# Patient Record
Sex: FEMALE | Race: WHITE | URBAN - METROPOLITAN AREA
[De-identification: names, ages, dates, MRNs, and addresses within clinical notes are randomized per-mention and may not be internally consistent; named-entity substitution may affect disease eponyms.]

---

## 2017-06-28 ENCOUNTER — HOSPITAL ENCOUNTER (EMERGENCY)
Facility: CLINIC | Age: 37
End: 2017-06-28
Payer: COMMERCIAL

## 2017-08-05 ENCOUNTER — HEALTH MAINTENANCE LETTER (OUTPATIENT)
Age: 37
End: 2017-08-05

## 2018-08-12 ENCOUNTER — HEALTH MAINTENANCE LETTER (OUTPATIENT)
Age: 38
End: 2018-08-12

## 2019-11-05 ENCOUNTER — HEALTH MAINTENANCE LETTER (OUTPATIENT)
Age: 39
End: 2019-11-05

## 2020-11-22 ENCOUNTER — HEALTH MAINTENANCE LETTER (OUTPATIENT)
Age: 40
End: 2020-11-22

## 2021-01-01 ENCOUNTER — HEALTH MAINTENANCE LETTER (OUTPATIENT)
Age: 41
End: 2021-01-01

## 2022-01-01 ENCOUNTER — APPOINTMENT (OUTPATIENT)
Dept: GENERAL RADIOLOGY | Facility: CLINIC | Age: 42
DRG: 163 | End: 2022-01-01
Attending: NURSE PRACTITIONER
Payer: COMMERCIAL

## 2022-01-01 ENCOUNTER — APPOINTMENT (OUTPATIENT)
Dept: CT IMAGING | Facility: CLINIC | Age: 42
End: 2022-01-01
Attending: EMERGENCY MEDICINE
Payer: COMMERCIAL

## 2022-01-01 ENCOUNTER — HEALTH MAINTENANCE LETTER (OUTPATIENT)
Age: 42
End: 2022-01-01

## 2022-01-01 ENCOUNTER — APPOINTMENT (OUTPATIENT)
Dept: NEUROLOGY | Facility: CLINIC | Age: 42
DRG: 163 | End: 2022-01-01
Attending: NURSE PRACTITIONER
Payer: COMMERCIAL

## 2022-01-01 ENCOUNTER — APPOINTMENT (OUTPATIENT)
Dept: PHYSICAL THERAPY | Facility: CLINIC | Age: 42
DRG: 163 | End: 2022-01-01
Attending: PHYSICIAN ASSISTANT
Payer: COMMERCIAL

## 2022-01-01 ENCOUNTER — HOSPITAL ENCOUNTER (EMERGENCY)
Facility: CLINIC | Age: 42
Discharge: ANOTHER HOSPITAL | End: 2022-01-15
Attending: EMERGENCY MEDICINE | Admitting: EMERGENCY MEDICINE
Payer: COMMERCIAL

## 2022-01-01 ENCOUNTER — APPOINTMENT (OUTPATIENT)
Dept: ULTRASOUND IMAGING | Facility: CLINIC | Age: 42
DRG: 163 | End: 2022-01-01
Attending: NURSE PRACTITIONER
Payer: COMMERCIAL

## 2022-01-01 ENCOUNTER — APPOINTMENT (OUTPATIENT)
Dept: CARDIOLOGY | Facility: CLINIC | Age: 42
DRG: 163 | End: 2022-01-01
Attending: NURSE PRACTITIONER
Payer: COMMERCIAL

## 2022-01-01 ENCOUNTER — APPOINTMENT (OUTPATIENT)
Dept: GENERAL RADIOLOGY | Facility: CLINIC | Age: 42
DRG: 163 | End: 2022-01-01
Attending: PHYSICIAN ASSISTANT
Payer: COMMERCIAL

## 2022-01-01 ENCOUNTER — HOSPITAL ENCOUNTER (EMERGENCY)
Facility: CLINIC | Age: 42
Discharge: STILL ADMITTED OR ADMINISTRATIVE | DRG: 163 | End: 2022-01-01
Attending: EMERGENCY MEDICINE | Admitting: EMERGENCY MEDICINE
Payer: COMMERCIAL

## 2022-01-01 ENCOUNTER — HOSPITAL ENCOUNTER (INPATIENT)
Facility: CLINIC | Age: 42
LOS: 11 days | DRG: 163 | End: 2022-01-26
Attending: EMERGENCY MEDICINE | Admitting: INTERNAL MEDICINE
Payer: COMMERCIAL

## 2022-01-01 ENCOUNTER — APPOINTMENT (OUTPATIENT)
Dept: GENERAL RADIOLOGY | Facility: CLINIC | Age: 42
End: 2022-01-01
Attending: EMERGENCY MEDICINE
Payer: COMMERCIAL

## 2022-01-01 ENCOUNTER — APPOINTMENT (OUTPATIENT)
Dept: GENERAL RADIOLOGY | Facility: CLINIC | Age: 42
DRG: 163 | End: 2022-01-01
Attending: INTERNAL MEDICINE
Payer: COMMERCIAL

## 2022-01-01 ENCOUNTER — APPOINTMENT (OUTPATIENT)
Dept: INTERVENTIONAL RADIOLOGY/VASCULAR | Facility: CLINIC | Age: 42
DRG: 163 | End: 2022-01-01
Attending: EMERGENCY MEDICINE
Payer: COMMERCIAL

## 2022-01-01 ENCOUNTER — APPOINTMENT (OUTPATIENT)
Dept: MRI IMAGING | Facility: CLINIC | Age: 42
DRG: 163 | End: 2022-01-01
Attending: PHYSICIAN ASSISTANT
Payer: COMMERCIAL

## 2022-01-01 VITALS
DIASTOLIC BLOOD PRESSURE: 86 MMHG | WEIGHT: 132.28 LBS | TEMPERATURE: 92.7 F | OXYGEN SATURATION: 99 % | RESPIRATION RATE: 24 BRPM | HEART RATE: 112 BPM | SYSTOLIC BLOOD PRESSURE: 106 MMHG

## 2022-01-01 VITALS
SYSTOLIC BLOOD PRESSURE: 144 MMHG | TEMPERATURE: 101.7 F | RESPIRATION RATE: 18 BRPM | HEIGHT: 66 IN | WEIGHT: 112.43 LBS | DIASTOLIC BLOOD PRESSURE: 81 MMHG | BODY MASS INDEX: 18.07 KG/M2 | HEART RATE: 95 BPM | OXYGEN SATURATION: 96 %

## 2022-01-01 DIAGNOSIS — I46.9 CARDIAC ARREST (H): ICD-10-CM

## 2022-01-01 DIAGNOSIS — I26.09 OTHER ACUTE PULMONARY EMBOLISM WITH ACUTE COR PULMONALE (H): ICD-10-CM

## 2022-01-01 LAB
ABO/RH(D): NORMAL
ACT BLD: 203 SECONDS (ref 74–150)
ALBUMIN SERPL-MCNC: 2.2 G/DL (ref 3.4–5)
ALBUMIN SERPL-MCNC: 2.2 G/DL (ref 3.4–5)
ALBUMIN SERPL-MCNC: 2.3 G/DL (ref 3.4–5)
ALBUMIN SERPL-MCNC: 2.3 G/DL (ref 3.4–5)
ALBUMIN SERPL-MCNC: 2.4 G/DL (ref 3.4–5)
ALBUMIN SERPL-MCNC: 2.4 G/DL (ref 3.4–5)
ALBUMIN SERPL-MCNC: 2.6 G/DL (ref 3.4–5)
ALBUMIN SERPL-MCNC: 2.7 G/DL (ref 3.4–5)
ALBUMIN SERPL-MCNC: 3.2 G/DL (ref 3.4–5)
ALBUMIN SERPL-MCNC: 3.2 G/DL (ref 3.4–5)
ALBUMIN UR-MCNC: 50 MG/DL
ALBUMIN UR-MCNC: NEGATIVE MG/DL
ALP SERPL-CCNC: 30 U/L (ref 40–150)
ALP SERPL-CCNC: 31 U/L (ref 40–150)
ALP SERPL-CCNC: 35 U/L (ref 40–150)
ALP SERPL-CCNC: 37 U/L (ref 40–150)
ALP SERPL-CCNC: 39 U/L (ref 40–150)
ALP SERPL-CCNC: 41 U/L (ref 40–150)
ALP SERPL-CCNC: 45 U/L (ref 40–150)
ALP SERPL-CCNC: 45 U/L (ref 40–150)
ALP SERPL-CCNC: 46 U/L (ref 40–150)
ALP SERPL-CCNC: 46 U/L (ref 40–150)
ALP SERPL-CCNC: 48 U/L (ref 40–150)
ALP SERPL-CCNC: 50 U/L (ref 40–150)
ALT SERPL W P-5'-P-CCNC: 107 U/L (ref 0–50)
ALT SERPL W P-5'-P-CCNC: 130 U/L (ref 0–50)
ALT SERPL W P-5'-P-CCNC: 132 U/L (ref 0–50)
ALT SERPL W P-5'-P-CCNC: 161 U/L (ref 0–50)
ALT SERPL W P-5'-P-CCNC: 165 U/L (ref 0–50)
ALT SERPL W P-5'-P-CCNC: 204 U/L (ref 0–50)
ALT SERPL W P-5'-P-CCNC: 233 U/L (ref 0–50)
ALT SERPL W P-5'-P-CCNC: 294 U/L (ref 0–50)
ALT SERPL W P-5'-P-CCNC: 67 U/L (ref 0–50)
ALT SERPL W P-5'-P-CCNC: 73 U/L (ref 0–50)
ALT SERPL W P-5'-P-CCNC: 85 U/L (ref 0–50)
ALT SERPL W P-5'-P-CCNC: 98 U/L (ref 0–50)
AMPHETAMINES UR QL SCN: ABNORMAL
ANION GAP SERPL CALCULATED.3IONS-SCNC: 10 MMOL/L (ref 3–14)
ANION GAP SERPL CALCULATED.3IONS-SCNC: 23 MMOL/L (ref 3–14)
ANION GAP SERPL CALCULATED.3IONS-SCNC: 5 MMOL/L (ref 3–14)
ANION GAP SERPL CALCULATED.3IONS-SCNC: 6 MMOL/L (ref 3–14)
ANION GAP SERPL CALCULATED.3IONS-SCNC: 7 MMOL/L (ref 3–14)
ANION GAP SERPL CALCULATED.3IONS-SCNC: 9 MMOL/L (ref 3–14)
ANTIBODY SCREEN: NEGATIVE
APPEARANCE UR: CLEAR
APPEARANCE UR: CLEAR
APTT PPP: 103 SECONDS (ref 22–38)
APTT PPP: 132 SECONDS (ref 22–38)
APTT PPP: 178 SECONDS (ref 22–38)
APTT PPP: 43 SECONDS (ref 22–38)
APTT PPP: 85 SECONDS (ref 22–38)
APTT PPP: 96 SECONDS (ref 22–38)
APTT PPP: >240 SECONDS (ref 22–38)
AST SERPL W P-5'-P-CCNC: 108 U/L (ref 0–45)
AST SERPL W P-5'-P-CCNC: 112 U/L (ref 0–45)
AST SERPL W P-5'-P-CCNC: 112 U/L (ref 0–45)
AST SERPL W P-5'-P-CCNC: 113 U/L (ref 0–45)
AST SERPL W P-5'-P-CCNC: 187 U/L (ref 0–45)
AST SERPL W P-5'-P-CCNC: 247 U/L (ref 0–45)
AST SERPL W P-5'-P-CCNC: 374 U/L (ref 0–45)
AST SERPL W P-5'-P-CCNC: 74 U/L (ref 0–45)
AST SERPL W P-5'-P-CCNC: 76 U/L (ref 0–45)
AST SERPL W P-5'-P-CCNC: 86 U/L (ref 0–45)
AST SERPL W P-5'-P-CCNC: 90 U/L (ref 0–45)
AST SERPL W P-5'-P-CCNC: 91 U/L (ref 0–45)
ATRIAL RATE - MUSE: 104 BPM
ATRIAL RATE - MUSE: 106 BPM
ATRIAL RATE - MUSE: 129 BPM
ATRIAL RATE - MUSE: 81 BPM
ATRIAL RATE - MUSE: 98 BPM
BACTERIA BLD CULT: NO GROWTH
BACTERIA SPT CULT: NORMAL
BACTERIA UR CULT: NO GROWTH
BARBITURATES UR QL: ABNORMAL
BASE EXCESS BLDA CALC-SCNC: -1 MMOL/L (ref -9–1.8)
BASE EXCESS BLDA CALC-SCNC: -1.2 MMOL/L (ref -9–1.8)
BASE EXCESS BLDA CALC-SCNC: -10.4 MMOL/L (ref -9–1.8)
BASE EXCESS BLDA CALC-SCNC: -10.7 MMOL/L (ref -9–1.8)
BASE EXCESS BLDA CALC-SCNC: -16.8 MMOL/L (ref -9–1.8)
BASE EXCESS BLDA CALC-SCNC: -2.5 MMOL/L (ref -9–1.8)
BASE EXCESS BLDA CALC-SCNC: -2.5 MMOL/L (ref -9–1.8)
BASE EXCESS BLDA CALC-SCNC: -2.7 MMOL/L (ref -9–1.8)
BASE EXCESS BLDA CALC-SCNC: -2.9 MMOL/L (ref -9–1.8)
BASE EXCESS BLDA CALC-SCNC: -3.1 MMOL/L (ref -9–1.8)
BASE EXCESS BLDA CALC-SCNC: -3.2 MMOL/L (ref -9–1.8)
BASE EXCESS BLDA CALC-SCNC: -3.6 MMOL/L (ref -9–1.8)
BASE EXCESS BLDA CALC-SCNC: -3.8 MMOL/L (ref -9–1.8)
BASE EXCESS BLDA CALC-SCNC: -3.9 MMOL/L (ref -9–1.8)
BASE EXCESS BLDA CALC-SCNC: -4.3 MMOL/L (ref -9–1.8)
BASE EXCESS BLDA CALC-SCNC: -4.4 MMOL/L (ref -9–1.8)
BASE EXCESS BLDA CALC-SCNC: -6.6 MMOL/L (ref -9–1.8)
BASE EXCESS BLDV CALC-SCNC: -8.8 MMOL/L (ref -7.7–1.9)
BASOPHILS # BLD AUTO: 0 10E3/UL (ref 0–0.2)
BASOPHILS # BLD AUTO: 0 10E3/UL (ref 0–0.2)
BASOPHILS # BLD MANUAL: 0 10E3/UL (ref 0–0.2)
BASOPHILS NFR BLD AUTO: 0 %
BASOPHILS NFR BLD AUTO: 0 %
BASOPHILS NFR BLD MANUAL: 0 %
BENZODIAZ UR QL: ABNORMAL
BILIRUB DIRECT SERPL-MCNC: 0.1 MG/DL (ref 0–0.2)
BILIRUB SERPL-MCNC: 0.2 MG/DL (ref 0.2–1.3)
BILIRUB SERPL-MCNC: 0.3 MG/DL (ref 0.2–1.3)
BILIRUB SERPL-MCNC: 0.4 MG/DL (ref 0.2–1.3)
BILIRUB UR QL STRIP: NEGATIVE
BILIRUB UR QL STRIP: NEGATIVE
BUN SERPL-MCNC: 10 MG/DL (ref 7–30)
BUN SERPL-MCNC: 13 MG/DL (ref 7–30)
BUN SERPL-MCNC: 14 MG/DL (ref 7–30)
BUN SERPL-MCNC: 19 MG/DL (ref 7–30)
BUN SERPL-MCNC: 20 MG/DL (ref 7–30)
BUN SERPL-MCNC: 5 MG/DL (ref 7–30)
BUN SERPL-MCNC: 7 MG/DL (ref 7–30)
BUN SERPL-MCNC: 8 MG/DL (ref 7–30)
BUN SERPL-MCNC: 9 MG/DL (ref 7–30)
CA-I BLD-MCNC: 4.2 MG/DL (ref 4.4–5.2)
CA-I BLD-MCNC: 4.3 MG/DL (ref 4.4–5.2)
CA-I BLD-MCNC: 4.4 MG/DL (ref 4.4–5.2)
CA-I BLD-MCNC: 4.4 MG/DL (ref 4.4–5.2)
CA-I BLD-MCNC: 4.5 MG/DL (ref 4.4–5.2)
CA-I BLD-MCNC: 4.5 MG/DL (ref 4.4–5.2)
CA-I BLD-MCNC: 4.6 MG/DL (ref 4.4–5.2)
CA-I BLD-MCNC: 4.7 MG/DL (ref 4.4–5.2)
CALCIUM SERPL-MCNC: 7.2 MG/DL (ref 8.5–10.1)
CALCIUM SERPL-MCNC: 7.3 MG/DL (ref 8.5–10.1)
CALCIUM SERPL-MCNC: 7.8 MG/DL (ref 8.5–10.1)
CALCIUM SERPL-MCNC: 7.9 MG/DL (ref 8.5–10.1)
CALCIUM SERPL-MCNC: 7.9 MG/DL (ref 8.5–10.1)
CALCIUM SERPL-MCNC: 8 MG/DL (ref 8.5–10.1)
CALCIUM SERPL-MCNC: 8.3 MG/DL (ref 8.5–10.1)
CALCIUM SERPL-MCNC: 8.4 MG/DL (ref 8.5–10.1)
CALCIUM SERPL-MCNC: 8.4 MG/DL (ref 8.5–10.1)
CALCIUM SERPL-MCNC: 8.7 MG/DL (ref 8.5–10.1)
CANNABINOIDS UR QL SCN: ABNORMAL
CF REDUC 30M P MA P HEP LENFR BLD TEG: 0 % (ref 0–8)
CF REDUC 30M P MA P HEP LENFR BLD TEG: 0 % (ref 0–8)
CF REDUC 30M P MA P HEP LENFR BLD TEG: 0.2 % (ref 0–8)
CF REDUC 60M P MA P HEPASE LENFR BLD TEG: 0.2 % (ref 0–15)
CF REDUC 60M P MA P HEPASE LENFR BLD TEG: 0.9 % (ref 0–15)
CF REDUC 60M P MA P HEPASE LENFR BLD TEG: 1.9 % (ref 0–15)
CFT P HPASE BLD TEG: 1.2 MINUTE (ref 1–3)
CFT P HPASE BLD TEG: 1.9 MINUTE (ref 1–3)
CFT P HPASE BLD TEG: 4 MINUTE (ref 1–3)
CHLORIDE BLD-SCNC: 103 MMOL/L (ref 94–109)
CHLORIDE BLD-SCNC: 106 MMOL/L (ref 94–109)
CHLORIDE BLD-SCNC: 107 MMOL/L (ref 94–109)
CHLORIDE BLD-SCNC: 109 MMOL/L (ref 94–109)
CHLORIDE BLD-SCNC: 110 MMOL/L (ref 94–109)
CHLORIDE BLD-SCNC: 111 MMOL/L (ref 94–109)
CHLORIDE BLD-SCNC: 111 MMOL/L (ref 94–109)
CHLORIDE BLD-SCNC: 112 MMOL/L (ref 94–109)
CHLORIDE BLD-SCNC: 112 MMOL/L (ref 94–109)
CHLORIDE BLD-SCNC: 113 MMOL/L (ref 94–109)
CHLORIDE BLD-SCNC: 113 MMOL/L (ref 94–109)
CHLORIDE BLD-SCNC: 114 MMOL/L (ref 94–109)
CI (COAGULATION INDEX)(Z): -0.6 (ref -3–3)
CI (COAGULATION INDEX)(Z): -5.1 (ref -3–3)
CI (COAGULATION INDEX)(Z): 2.3 (ref -3–3)
CK SERPL-CCNC: 1730 U/L (ref 30–225)
CLOT ANGLE P HPASE BLD TEG: 45.3 DEGREES (ref 53–72)
CLOT ANGLE P HPASE BLD TEG: 62.9 DEGREES (ref 53–72)
CLOT ANGLE P HPASE BLD TEG: 73.3 DEGREES (ref 53–72)
CLOT INIT P HPASE BLD TEG: 6.4 MINUTE (ref 5–10)
CLOT INIT P HPASE BLD TEG: 7.2 MINUTE (ref 5–10)
CLOT INIT P HPASE BLD TEG: 8.8 MINUTE (ref 5–10)
CLOT STRENGTH P HPASE BLD TEG: 14.7 KD/SC (ref 4.5–11)
CLOT STRENGTH P HPASE BLD TEG: 5.6 KD/SC (ref 4.5–11)
CLOT STRENGTH P HPASE BLD TEG: 8.9 KD/SC (ref 4.5–11)
CO2 SERPL-SCNC: 12 MMOL/L (ref 20–32)
CO2 SERPL-SCNC: 19 MMOL/L (ref 20–32)
CO2 SERPL-SCNC: 19 MMOL/L (ref 20–32)
CO2 SERPL-SCNC: 20 MMOL/L (ref 20–32)
CO2 SERPL-SCNC: 20 MMOL/L (ref 20–32)
CO2 SERPL-SCNC: 22 MMOL/L (ref 20–32)
CO2 SERPL-SCNC: 23 MMOL/L (ref 20–32)
CO2 SERPL-SCNC: 23 MMOL/L (ref 20–32)
CO2 SERPL-SCNC: 24 MMOL/L (ref 20–32)
COCAINE UR QL: ABNORMAL
COLOR UR AUTO: ABNORMAL
COLOR UR AUTO: ABNORMAL
CORTIS SERPL-MCNC: 32.8 UG/DL (ref 4–22)
CREAT SERPL-MCNC: 0.46 MG/DL (ref 0.52–1.04)
CREAT SERPL-MCNC: 0.47 MG/DL (ref 0.52–1.04)
CREAT SERPL-MCNC: 0.56 MG/DL (ref 0.52–1.04)
CREAT SERPL-MCNC: 0.56 MG/DL (ref 0.52–1.04)
CREAT SERPL-MCNC: 0.58 MG/DL (ref 0.52–1.04)
CREAT SERPL-MCNC: 0.59 MG/DL (ref 0.52–1.04)
CREAT SERPL-MCNC: 0.6 MG/DL (ref 0.52–1.04)
CREAT SERPL-MCNC: 0.6 MG/DL (ref 0.52–1.04)
CREAT SERPL-MCNC: 0.62 MG/DL (ref 0.52–1.04)
CREAT SERPL-MCNC: 0.81 MG/DL (ref 0.52–1.04)
CREAT SERPL-MCNC: 0.9 MG/DL (ref 0.52–1.04)
CREAT SERPL-MCNC: 1.18 MG/DL (ref 0.52–1.04)
CRP SERPL-MCNC: 140 MG/L (ref 0–8)
CRP SERPL-MCNC: 180 MG/L (ref 0–8)
CRP SERPL-MCNC: 230 MG/L (ref 0–8)
CRP SERPL-MCNC: 25 MG/L (ref 0–8)
CRP SERPL-MCNC: 79 MG/L (ref 0–8)
CRP SERPL-MCNC: <2.9 MG/L (ref 0–8)
DIASTOLIC BLOOD PRESSURE - MUSE: NORMAL MMHG
EOSINOPHIL # BLD AUTO: 0 10E3/UL (ref 0–0.7)
EOSINOPHIL # BLD AUTO: 0 10E3/UL (ref 0–0.7)
EOSINOPHIL # BLD MANUAL: 0 10E3/UL (ref 0–0.7)
EOSINOPHIL NFR BLD AUTO: 0 %
EOSINOPHIL NFR BLD AUTO: 1 %
EOSINOPHIL NFR BLD MANUAL: 0 %
ERYTHROCYTE [DISTWIDTH] IN BLOOD BY AUTOMATED COUNT: 12.5 % (ref 10–15)
ERYTHROCYTE [DISTWIDTH] IN BLOOD BY AUTOMATED COUNT: 12.6 % (ref 10–15)
ERYTHROCYTE [DISTWIDTH] IN BLOOD BY AUTOMATED COUNT: 12.7 % (ref 10–15)
ERYTHROCYTE [DISTWIDTH] IN BLOOD BY AUTOMATED COUNT: 12.7 % (ref 10–15)
ERYTHROCYTE [DISTWIDTH] IN BLOOD BY AUTOMATED COUNT: 12.8 % (ref 10–15)
ERYTHROCYTE [DISTWIDTH] IN BLOOD BY AUTOMATED COUNT: 12.9 % (ref 10–15)
ERYTHROCYTE [DISTWIDTH] IN BLOOD BY AUTOMATED COUNT: 13 % (ref 10–15)
ERYTHROCYTE [SEDIMENTATION RATE] IN BLOOD BY WESTERGREN METHOD: 115 MM/HR (ref 0–20)
ERYTHROCYTE [SEDIMENTATION RATE] IN BLOOD BY WESTERGREN METHOD: 35 MM/HR (ref 0–20)
ERYTHROCYTE [SEDIMENTATION RATE] IN BLOOD BY WESTERGREN METHOD: 5 MM/HR (ref 0–20)
ERYTHROCYTE [SEDIMENTATION RATE] IN BLOOD BY WESTERGREN METHOD: 74 MM/HR (ref 0–20)
ERYTHROCYTE [SEDIMENTATION RATE] IN BLOOD BY WESTERGREN METHOD: 8 MM/HR (ref 0–20)
ERYTHROCYTE [SEDIMENTATION RATE] IN BLOOD BY WESTERGREN METHOD: 93 MM/HR (ref 0–20)
ETHANOL SERPL-MCNC: <0.01 G/DL
ETHANOL UR QL SCN: ABNORMAL
FIBRINOGEN PPP-MCNC: 142 MG/DL (ref 170–490)
FIBRINOGEN PPP-MCNC: 207 MG/DL (ref 170–490)
FIBRINOGEN PPP-MCNC: 421 MG/DL (ref 170–490)
FIBRINOGEN PPP-MCNC: 559 MG/DL (ref 170–490)
FIBRINOGEN PPP-MCNC: 658 MG/DL (ref 170–490)
FIBRINOGEN PPP-MCNC: 677 MG/DL (ref 170–490)
GFR SERPL CREATININE-BSD FRML MDRD: 60 ML/MIN/1.73M2
GFR SERPL CREATININE-BSD FRML MDRD: 82 ML/MIN/1.73M2
GFR SERPL CREATININE-BSD FRML MDRD: >90 ML/MIN/1.73M2
GLUCOSE BLD-MCNC: 114 MG/DL (ref 70–99)
GLUCOSE BLD-MCNC: 116 MG/DL (ref 70–99)
GLUCOSE BLD-MCNC: 117 MG/DL (ref 70–99)
GLUCOSE BLD-MCNC: 118 MG/DL (ref 70–99)
GLUCOSE BLD-MCNC: 120 MG/DL (ref 70–99)
GLUCOSE BLD-MCNC: 121 MG/DL (ref 70–99)
GLUCOSE BLD-MCNC: 121 MG/DL (ref 70–99)
GLUCOSE BLD-MCNC: 122 MG/DL (ref 70–99)
GLUCOSE BLD-MCNC: 125 MG/DL (ref 70–99)
GLUCOSE BLD-MCNC: 127 MG/DL (ref 70–99)
GLUCOSE BLD-MCNC: 130 MG/DL (ref 70–99)
GLUCOSE BLD-MCNC: 131 MG/DL (ref 70–99)
GLUCOSE BLD-MCNC: 134 MG/DL (ref 70–99)
GLUCOSE BLD-MCNC: 136 MG/DL (ref 70–99)
GLUCOSE BLD-MCNC: 140 MG/DL (ref 70–99)
GLUCOSE BLD-MCNC: 223 MG/DL (ref 70–99)
GLUCOSE BLD-MCNC: 381 MG/DL (ref 70–99)
GLUCOSE BLD-MCNC: 86 MG/DL (ref 70–99)
GLUCOSE BLD-MCNC: 86 MG/DL (ref 70–99)
GLUCOSE BLD-MCNC: 87 MG/DL (ref 70–99)
GLUCOSE BLD-MCNC: 88 MG/DL (ref 70–99)
GLUCOSE BLD-MCNC: 91 MG/DL (ref 70–99)
GLUCOSE BLD-MCNC: 91 MG/DL (ref 70–99)
GLUCOSE BLD-MCNC: 92 MG/DL (ref 70–99)
GLUCOSE BLD-MCNC: 94 MG/DL (ref 70–99)
GLUCOSE BLD-MCNC: 95 MG/DL (ref 70–99)
GLUCOSE BLDC GLUCOMTR-MCNC: 100 MG/DL (ref 70–99)
GLUCOSE BLDC GLUCOMTR-MCNC: 101 MG/DL (ref 70–99)
GLUCOSE BLDC GLUCOMTR-MCNC: 106 MG/DL (ref 70–99)
GLUCOSE BLDC GLUCOMTR-MCNC: 107 MG/DL (ref 70–99)
GLUCOSE BLDC GLUCOMTR-MCNC: 109 MG/DL (ref 70–99)
GLUCOSE BLDC GLUCOMTR-MCNC: 118 MG/DL (ref 70–99)
GLUCOSE BLDC GLUCOMTR-MCNC: 122 MG/DL (ref 70–99)
GLUCOSE BLDC GLUCOMTR-MCNC: 125 MG/DL (ref 70–99)
GLUCOSE BLDC GLUCOMTR-MCNC: 125 MG/DL (ref 70–99)
GLUCOSE BLDC GLUCOMTR-MCNC: 130 MG/DL (ref 70–99)
GLUCOSE BLDC GLUCOMTR-MCNC: 138 MG/DL (ref 70–99)
GLUCOSE BLDC GLUCOMTR-MCNC: 141 MG/DL (ref 70–99)
GLUCOSE BLDC GLUCOMTR-MCNC: 325 MG/DL (ref 70–99)
GLUCOSE BLDC GLUCOMTR-MCNC: 72 MG/DL (ref 70–99)
GLUCOSE BLDC GLUCOMTR-MCNC: 78 MG/DL (ref 70–99)
GLUCOSE BLDC GLUCOMTR-MCNC: 80 MG/DL (ref 70–99)
GLUCOSE BLDC GLUCOMTR-MCNC: 81 MG/DL (ref 70–99)
GLUCOSE BLDC GLUCOMTR-MCNC: 85 MG/DL (ref 70–99)
GLUCOSE BLDC GLUCOMTR-MCNC: 85 MG/DL (ref 70–99)
GLUCOSE BLDC GLUCOMTR-MCNC: 87 MG/DL (ref 70–99)
GLUCOSE BLDC GLUCOMTR-MCNC: 93 MG/DL (ref 70–99)
GLUCOSE BLDC GLUCOMTR-MCNC: 96 MG/DL (ref 70–99)
GLUCOSE BLDC GLUCOMTR-MCNC: 97 MG/DL (ref 70–99)
GLUCOSE UR STRIP-MCNC: 100 MG/DL
GLUCOSE UR STRIP-MCNC: NEGATIVE MG/DL
GRAM STAIN RESULT: NORMAL
GRAM STAIN RESULT: NORMAL
GRANULAR CAST: 4 /LPF
HBA1C MFR BLD: 5.4 % (ref 0–5.6)
HCG SERPL QL: NEGATIVE
HCO3 BLD-SCNC: 13 MMOL/L (ref 21–28)
HCO3 BLD-SCNC: 17 MMOL/L (ref 21–28)
HCO3 BLD-SCNC: 18 MMOL/L (ref 21–28)
HCO3 BLD-SCNC: 20 MMOL/L (ref 21–28)
HCO3 BLD-SCNC: 20 MMOL/L (ref 21–28)
HCO3 BLD-SCNC: 21 MMOL/L (ref 21–28)
HCO3 BLD-SCNC: 22 MMOL/L (ref 21–28)
HCO3 BLD-SCNC: 22 MMOL/L (ref 21–28)
HCO3 BLD-SCNC: 23 MMOL/L (ref 21–28)
HCO3 BLDV-SCNC: 19 MMOL/L (ref 21–28)
HCT VFR BLD AUTO: 22.1 % (ref 35–47)
HCT VFR BLD AUTO: 22.4 % (ref 35–47)
HCT VFR BLD AUTO: 22.9 % (ref 35–47)
HCT VFR BLD AUTO: 23.8 % (ref 35–47)
HCT VFR BLD AUTO: 24.4 % (ref 35–47)
HCT VFR BLD AUTO: 27.6 % (ref 35–47)
HCT VFR BLD AUTO: 28 % (ref 35–47)
HCT VFR BLD AUTO: 29.3 % (ref 35–47)
HCT VFR BLD AUTO: 31.6 % (ref 35–47)
HCT VFR BLD AUTO: 31.9 % (ref 35–47)
HCT VFR BLD AUTO: 38.2 % (ref 35–47)
HCT VFR BLD AUTO: 38.5 % (ref 35–47)
HCT VFR BLD AUTO: 42.3 % (ref 35–47)
HCT VFR BLD AUTO: 46.6 % (ref 35–47)
HGB BLD-MCNC: 10.6 G/DL (ref 11.7–15.7)
HGB BLD-MCNC: 10.7 G/DL (ref 11.7–15.7)
HGB BLD-MCNC: 12.9 G/DL (ref 11.7–15.7)
HGB BLD-MCNC: 12.9 G/DL (ref 11.7–15.7)
HGB BLD-MCNC: 13.5 G/DL (ref 11.7–15.7)
HGB BLD-MCNC: 13.9 G/DL (ref 11.7–15.7)
HGB BLD-MCNC: 7.5 G/DL (ref 11.7–15.7)
HGB BLD-MCNC: 7.7 G/DL (ref 11.7–15.7)
HGB BLD-MCNC: 8.1 G/DL (ref 11.7–15.7)
HGB BLD-MCNC: 8.3 G/DL (ref 11.7–15.7)
HGB BLD-MCNC: 8.4 G/DL (ref 11.7–15.7)
HGB BLD-MCNC: 9.5 G/DL (ref 11.7–15.7)
HGB BLD-MCNC: 9.6 G/DL (ref 11.7–15.7)
HGB BLD-MCNC: 9.9 G/DL (ref 11.7–15.7)
HGB UR QL STRIP: ABNORMAL
HGB UR QL STRIP: NEGATIVE
HOLD SPECIMEN: NORMAL
IMM GRANULOCYTES # BLD: 0.1 10E3/UL
IMM GRANULOCYTES # BLD: 0.1 10E3/UL
IMM GRANULOCYTES NFR BLD: 1 %
IMM GRANULOCYTES NFR BLD: 1 %
INR PPP: 1.23 (ref 0.85–1.15)
INR PPP: 1.26 (ref 0.85–1.15)
INR PPP: 1.4 (ref 0.85–1.15)
INR PPP: 1.42 (ref 0.85–1.15)
INR PPP: 1.53 (ref 0.85–1.15)
INR PPP: 1.64 (ref 0.85–1.15)
INR PPP: 1.66 (ref 0.85–1.15)
INR PPP: 1.7 (ref 0.85–1.15)
INTERPRETATION ECG - MUSE: NORMAL
INTERPRETATION TEGPIA: NORMAL
KETONES UR STRIP-MCNC: 20 MG/DL
KETONES UR STRIP-MCNC: NEGATIVE MG/DL
LACTATE BLD-SCNC: 14.4 MMOL/L
LACTATE SERPL-SCNC: 0.5 MMOL/L (ref 0.7–2)
LACTATE SERPL-SCNC: 0.5 MMOL/L (ref 0.7–2)
LACTATE SERPL-SCNC: 0.6 MMOL/L (ref 0.7–2)
LACTATE SERPL-SCNC: 0.7 MMOL/L (ref 0.7–2)
LACTATE SERPL-SCNC: 0.7 MMOL/L (ref 0.7–2)
LACTATE SERPL-SCNC: 0.8 MMOL/L (ref 0.7–2)
LACTATE SERPL-SCNC: 0.9 MMOL/L (ref 0.7–2)
LACTATE SERPL-SCNC: 1 MMOL/L (ref 0.7–2)
LACTATE SERPL-SCNC: 1.3 MMOL/L (ref 0.7–2)
LACTATE SERPL-SCNC: 1.5 MMOL/L (ref 0.7–2)
LACTATE SERPL-SCNC: 1.6 MMOL/L (ref 0.7–2)
LACTATE SERPL-SCNC: 1.6 MMOL/L (ref 0.7–2)
LACTATE SERPL-SCNC: 14.4 MMOL/L (ref 0.7–2)
LACTATE SERPL-SCNC: 8.9 MMOL/L (ref 0.7–2)
LDH SERPL L TO P-CCNC: 476 U/L (ref 81–234)
LEUKOCYTE ESTERASE UR QL STRIP: NEGATIVE
LEUKOCYTE ESTERASE UR QL STRIP: NEGATIVE
LVEF ECHO: NORMAL
LYMPHOCYTES # BLD AUTO: 0.9 10E3/UL (ref 0.8–5.3)
LYMPHOCYTES # BLD AUTO: 1.1 10E3/UL (ref 0.8–5.3)
LYMPHOCYTES # BLD MANUAL: 9.9 10E3/UL (ref 0.8–5.3)
LYMPHOCYTES NFR BLD AUTO: 11 %
LYMPHOCYTES NFR BLD AUTO: 7 %
LYMPHOCYTES NFR BLD MANUAL: 64 %
MAGNESIUM SERPL-MCNC: 1.6 MG/DL (ref 1.6–2.3)
MAGNESIUM SERPL-MCNC: 1.7 MG/DL (ref 1.6–2.3)
MAGNESIUM SERPL-MCNC: 1.7 MG/DL (ref 1.6–2.3)
MAGNESIUM SERPL-MCNC: 1.8 MG/DL (ref 1.6–2.3)
MAGNESIUM SERPL-MCNC: 1.8 MG/DL (ref 1.6–2.3)
MAGNESIUM SERPL-MCNC: 1.9 MG/DL (ref 1.6–2.3)
MAGNESIUM SERPL-MCNC: 2 MG/DL (ref 1.6–2.3)
MAGNESIUM SERPL-MCNC: 2.1 MG/DL (ref 1.6–2.3)
MAGNESIUM SERPL-MCNC: 2.2 MG/DL (ref 1.6–2.3)
MAGNESIUM SERPL-MCNC: 2.3 MG/DL (ref 1.6–2.3)
MAGNESIUM SERPL-MCNC: 2.4 MG/DL (ref 1.6–2.3)
MAGNESIUM SERPL-MCNC: 3.1 MG/DL (ref 1.6–2.3)
MCF P HPASE BLD TEG: 52.8 MM (ref 50–70)
MCF P HPASE BLD TEG: 64 MM (ref 50–70)
MCF P HPASE BLD TEG: 74.7 MM (ref 50–70)
MCH RBC QN AUTO: 30.9 PG (ref 26.5–33)
MCH RBC QN AUTO: 31.2 PG (ref 26.5–33)
MCH RBC QN AUTO: 31.3 PG (ref 26.5–33)
MCH RBC QN AUTO: 31.4 PG (ref 26.5–33)
MCH RBC QN AUTO: 31.5 PG (ref 26.5–33)
MCH RBC QN AUTO: 31.6 PG (ref 26.5–33)
MCH RBC QN AUTO: 31.7 PG (ref 26.5–33)
MCH RBC QN AUTO: 31.7 PG (ref 26.5–33)
MCH RBC QN AUTO: 31.9 PG (ref 26.5–33)
MCHC RBC AUTO-ENTMCNC: 29 G/DL (ref 31.5–36.5)
MCHC RBC AUTO-ENTMCNC: 32.9 G/DL (ref 31.5–36.5)
MCHC RBC AUTO-ENTMCNC: 33.5 G/DL (ref 31.5–36.5)
MCHC RBC AUTO-ENTMCNC: 33.8 G/DL (ref 31.5–36.5)
MCHC RBC AUTO-ENTMCNC: 33.8 G/DL (ref 31.5–36.5)
MCHC RBC AUTO-ENTMCNC: 33.9 G/DL (ref 31.5–36.5)
MCHC RBC AUTO-ENTMCNC: 34.3 G/DL (ref 31.5–36.5)
MCHC RBC AUTO-ENTMCNC: 34.4 G/DL (ref 31.5–36.5)
MCHC RBC AUTO-ENTMCNC: 34.9 G/DL (ref 31.5–36.5)
MCHC RBC AUTO-ENTMCNC: 35.4 G/DL (ref 31.5–36.5)
MCV RBC AUTO: 109 FL (ref 78–100)
MCV RBC AUTO: 89 FL (ref 78–100)
MCV RBC AUTO: 90 FL (ref 78–100)
MCV RBC AUTO: 91 FL (ref 78–100)
MCV RBC AUTO: 91 FL (ref 78–100)
MCV RBC AUTO: 92 FL (ref 78–100)
MCV RBC AUTO: 93 FL (ref 78–100)
MCV RBC AUTO: 94 FL (ref 78–100)
MCV RBC AUTO: 95 FL (ref 78–100)
MCV RBC AUTO: 97 FL (ref 78–100)
MONOCYTES # BLD AUTO: 0.4 10E3/UL (ref 0–1.3)
MONOCYTES # BLD AUTO: 1.4 10E3/UL (ref 0–1.3)
MONOCYTES # BLD MANUAL: 0.8 10E3/UL (ref 0–1.3)
MONOCYTES NFR BLD AUTO: 5 %
MONOCYTES NFR BLD AUTO: 9 %
MONOCYTES NFR BLD MANUAL: 5 %
MRSA DNA SPEC QL NAA+PROBE: NEGATIVE
MUCOUS THREADS #/AREA URNS LPF: PRESENT /LPF
NEUTROPHILS # BLD AUTO: 13.3 10E3/UL (ref 1.6–8.3)
NEUTROPHILS # BLD AUTO: 6.9 10E3/UL (ref 1.6–8.3)
NEUTROPHILS # BLD MANUAL: 4.8 10E3/UL (ref 1.6–8.3)
NEUTROPHILS NFR BLD AUTO: 82 %
NEUTROPHILS NFR BLD AUTO: 83 %
NEUTROPHILS NFR BLD MANUAL: 31 %
NITRATE UR QL: NEGATIVE
NITRATE UR QL: NEGATIVE
NRBC # BLD AUTO: 0 10E3/UL
NRBC # BLD AUTO: 0 10E3/UL
NRBC BLD AUTO-RTO: 0 /100
NRBC BLD AUTO-RTO: 0 /100
NSE SERPL IA-MCNC: 19.1 NG/ML
NSE SERPL IA-MCNC: 21.9 NG/ML
NSE SERPL IA-MCNC: 27.5 NG/ML
O2/TOTAL GAS SETTING VFR VENT: 0 %
O2/TOTAL GAS SETTING VFR VENT: 1 %
O2/TOTAL GAS SETTING VFR VENT: 2 %
O2/TOTAL GAS SETTING VFR VENT: 30 %
O2/TOTAL GAS SETTING VFR VENT: 4 %
O2/TOTAL GAS SETTING VFR VENT: 40 %
O2/TOTAL GAS SETTING VFR VENT: 60 %
OPIATES UR QL SCN: ABNORMAL
OXYHGB MFR BLD: 92 % (ref 92–100)
OXYHGB MFR BLD: 95 % (ref 92–100)
OXYHGB MFR BLD: 96 % (ref 92–100)
OXYHGB MFR BLD: 96 % (ref 92–100)
OXYHGB MFR BLD: 97 % (ref 92–100)
OXYHGB MFR BLD: 97 % (ref 92–100)
OXYHGB MFR BLD: 98 % (ref 92–100)
OXYHGB MFR BLDV: 40 % (ref 70–75)
P AXIS - MUSE: 40 DEGREES
P AXIS - MUSE: 69 DEGREES
P AXIS - MUSE: 72 DEGREES
P AXIS - MUSE: 73 DEGREES
P AXIS - MUSE: 75 DEGREES
PA AA BLD-ACNC: 63 %
PA AA BLD-ACNC: 7 %
PA AA BLD-ACNC: 74 %
PA ADP BLD-ACNC: 21 %
PA ADP BLD-ACNC: 63 %
PA ADP BLD-ACNC: 99 %
PCO2 BLD: 28 MM HG (ref 35–45)
PCO2 BLD: 31 MM HG (ref 35–45)
PCO2 BLD: 31 MM HG (ref 35–45)
PCO2 BLD: 33 MM HG (ref 35–45)
PCO2 BLD: 33 MM HG (ref 35–45)
PCO2 BLD: 34 MM HG (ref 35–45)
PCO2 BLD: 34 MM HG (ref 35–45)
PCO2 BLD: 41 MM HG (ref 35–45)
PCO2 BLD: 41 MM HG (ref 35–45)
PCO2 BLD: 42 MM HG (ref 35–45)
PCO2 BLD: 43 MM HG (ref 35–45)
PCO2 BLD: 44 MM HG (ref 35–45)
PCO2 BLD: 44 MM HG (ref 35–45)
PCO2 BLD: 47 MM HG (ref 35–45)
PCO2 BLD: 51 MM HG (ref 35–45)
PCO2 BLDV: 50 MM HG (ref 40–50)
PCO2 BLDV: 99 MM HG (ref 40–50)
PH BLD: 7.08 [PH] (ref 7.35–7.45)
PH BLD: 7.17 [PH] (ref 7.35–7.45)
PH BLD: 7.2 [PH] (ref 7.35–7.45)
PH BLD: 7.27 [PH] (ref 7.35–7.45)
PH BLD: 7.3 [PH] (ref 7.35–7.45)
PH BLD: 7.31 [PH] (ref 7.35–7.45)
PH BLD: 7.32 [PH] (ref 7.35–7.45)
PH BLD: 7.33 [PH] (ref 7.35–7.45)
PH BLD: 7.33 [PH] (ref 7.35–7.45)
PH BLD: 7.36 [PH] (ref 7.35–7.45)
PH BLD: 7.39 [PH] (ref 7.35–7.45)
PH BLD: 7.4 [PH] (ref 7.35–7.45)
PH BLD: 7.43 [PH] (ref 7.35–7.45)
PH BLD: 7.43 [PH] (ref 7.35–7.45)
PH BLD: 7.44 [PH] (ref 7.35–7.45)
PH BLD: 7.45 [PH] (ref 7.35–7.45)
PH BLD: 7.47 [PH] (ref 7.35–7.45)
PH BLDV: 7.2 [PH] (ref 7.32–7.43)
PH BLDV: <7 [PH] (ref 7.32–7.43)
PH UR STRIP: 6 [PH] (ref 5–7)
PH UR STRIP: 6 [PH] (ref 5–7)
PHOSPHATE SERPL-MCNC: 1.3 MG/DL (ref 2.5–4.5)
PHOSPHATE SERPL-MCNC: 2.4 MG/DL (ref 2.5–4.5)
PHOSPHATE SERPL-MCNC: 2.6 MG/DL (ref 2.5–4.5)
PHOSPHATE SERPL-MCNC: 3 MG/DL (ref 2.5–4.5)
PHOSPHATE SERPL-MCNC: 3.4 MG/DL (ref 2.5–4.5)
PLAT MORPH BLD: ABNORMAL
PLATELET # BLD AUTO: 123 10E3/UL (ref 150–450)
PLATELET # BLD AUTO: 131 10E3/UL (ref 150–450)
PLATELET # BLD AUTO: 139 10E3/UL (ref 150–450)
PLATELET # BLD AUTO: 150 10E3/UL (ref 150–450)
PLATELET # BLD AUTO: 154 10E3/UL (ref 150–450)
PLATELET # BLD AUTO: 157 10E3/UL (ref 150–450)
PLATELET # BLD AUTO: 175 10E3/UL (ref 150–450)
PLATELET # BLD AUTO: 62 10E3/UL (ref 150–450)
PLATELET # BLD AUTO: 79 10E3/UL (ref 150–450)
PLATELET # BLD AUTO: 80 10E3/UL (ref 150–450)
PLATELET # BLD AUTO: 93 10E3/UL (ref 150–450)
PLATELET # BLD AUTO: 94 10E3/UL (ref 150–450)
PLATELET # BLD AUTO: 98 10E3/UL (ref 150–450)
PLATELET # BLD AUTO: 98 10E3/UL (ref 150–450)
PO2 BLD: 104 MM HG (ref 80–105)
PO2 BLD: 107 MM HG (ref 80–105)
PO2 BLD: 108 MM HG (ref 80–105)
PO2 BLD: 111 MM HG (ref 80–105)
PO2 BLD: 125 MM HG (ref 80–105)
PO2 BLD: 127 MM HG (ref 80–105)
PO2 BLD: 140 MM HG (ref 80–105)
PO2 BLD: 193 MM HG (ref 80–105)
PO2 BLD: 71 MM HG (ref 80–105)
PO2 BLD: 77 MM HG (ref 80–105)
PO2 BLD: 80 MM HG (ref 80–105)
PO2 BLD: 80 MM HG (ref 80–105)
PO2 BLD: 81 MM HG (ref 80–105)
PO2 BLD: 83 MM HG (ref 80–105)
PO2 BLD: 83 MM HG (ref 80–105)
PO2 BLD: 84 MM HG (ref 80–105)
PO2 BLD: 91 MM HG (ref 80–105)
PO2 BLDV: 28 MM HG (ref 25–47)
PO2 BLDV: 65 MM HG (ref 25–47)
POTASSIUM BLD-SCNC: 3.2 MMOL/L (ref 3.4–5.3)
POTASSIUM BLD-SCNC: 3.3 MMOL/L (ref 3.4–5.3)
POTASSIUM BLD-SCNC: 3.4 MMOL/L (ref 3.4–5.3)
POTASSIUM BLD-SCNC: 3.5 MMOL/L (ref 3.4–5.3)
POTASSIUM BLD-SCNC: 3.6 MMOL/L (ref 3.4–5.3)
POTASSIUM BLD-SCNC: 3.6 MMOL/L (ref 3.4–5.3)
POTASSIUM BLD-SCNC: 3.7 MMOL/L (ref 3.4–5.3)
POTASSIUM BLD-SCNC: 3.7 MMOL/L (ref 3.4–5.3)
POTASSIUM BLD-SCNC: 3.8 MMOL/L (ref 3.4–5.3)
POTASSIUM BLD-SCNC: 3.8 MMOL/L (ref 3.4–5.3)
POTASSIUM BLD-SCNC: 4 MMOL/L (ref 3.4–5.3)
POTASSIUM BLD-SCNC: 4.4 MMOL/L (ref 3.4–5.3)
POTASSIUM BLD-SCNC: 4.6 MMOL/L (ref 3.4–5.3)
POTASSIUM BLD-SCNC: 4.6 MMOL/L (ref 3.4–5.3)
PR INTERVAL - MUSE: 152 MS
PR INTERVAL - MUSE: 158 MS
PR INTERVAL - MUSE: 164 MS
PR INTERVAL - MUSE: 168 MS
PR INTERVAL - MUSE: 198 MS
PROCALCITONIN SERPL-MCNC: 0.26 NG/ML
PROCALCITONIN SERPL-MCNC: 0.38 NG/ML
PROT SERPL-MCNC: 5 G/DL (ref 6.8–8.8)
PROT SERPL-MCNC: 5.2 G/DL (ref 6.8–8.8)
PROT SERPL-MCNC: 5.3 G/DL (ref 6.8–8.8)
PROT SERPL-MCNC: 5.8 G/DL (ref 6.8–8.8)
PROT SERPL-MCNC: 5.8 G/DL (ref 6.8–8.8)
PROT SERPL-MCNC: 5.9 G/DL (ref 6.8–8.8)
PROT SERPL-MCNC: 5.9 G/DL (ref 6.8–8.8)
PROT SERPL-MCNC: 6 G/DL (ref 6.8–8.8)
PROT SERPL-MCNC: 6 G/DL (ref 6.8–8.8)
PROT SERPL-MCNC: 6.3 G/DL (ref 6.8–8.8)
PROT SERPL-MCNC: 6.4 G/DL (ref 6.8–8.8)
PROT SERPL-MCNC: 6.5 G/DL (ref 6.8–8.8)
QRS DURATION - MUSE: 70 MS
QRS DURATION - MUSE: 72 MS
QRS DURATION - MUSE: 74 MS
QRS DURATION - MUSE: 78 MS
QRS DURATION - MUSE: 92 MS
QT - MUSE: 298 MS
QT - MUSE: 350 MS
QT - MUSE: 364 MS
QT - MUSE: 376 MS
QT - MUSE: 436 MS
QTC - MUSE: 436 MS
QTC - MUSE: 464 MS
QTC - MUSE: 464 MS
QTC - MUSE: 494 MS
QTC - MUSE: 506 MS
R AXIS - MUSE: 26 DEGREES
R AXIS - MUSE: 37 DEGREES
R AXIS - MUSE: 44 DEGREES
R AXIS - MUSE: 52 DEGREES
R AXIS - MUSE: 53 DEGREES
RADIOLOGIST FLAGS: ABNORMAL
RBC # BLD AUTO: 2.43 10E6/UL (ref 3.8–5.2)
RBC # BLD AUTO: 2.44 10E6/UL (ref 3.8–5.2)
RBC # BLD AUTO: 2.58 10E6/UL (ref 3.8–5.2)
RBC # BLD AUTO: 2.66 10E6/UL (ref 3.8–5.2)
RBC # BLD AUTO: 2.68 10E6/UL (ref 3.8–5.2)
RBC # BLD AUTO: 3.01 10E6/UL (ref 3.8–5.2)
RBC # BLD AUTO: 3.05 10E6/UL (ref 3.8–5.2)
RBC # BLD AUTO: 3.15 10E6/UL (ref 3.8–5.2)
RBC # BLD AUTO: 3.38 10E6/UL (ref 3.8–5.2)
RBC # BLD AUTO: 3.38 10E6/UL (ref 3.8–5.2)
RBC # BLD AUTO: 4.07 10E6/UL (ref 3.8–5.2)
RBC # BLD AUTO: 4.08 10E6/UL (ref 3.8–5.2)
RBC # BLD AUTO: 4.27 10E6/UL (ref 3.8–5.2)
RBC # BLD AUTO: 4.36 10E6/UL (ref 3.8–5.2)
RBC MORPH BLD: ABNORMAL
RBC URINE: 0 /HPF
RBC URINE: <1 /HPF
S100 CA BINDING PROTEIN B SER-MCNC: 172 NG/L
S100 CA BINDING PROTEIN B SER-MCNC: 295 NG/L
S100 CA BINDING PROTEIN B SER-MCNC: 90 NG/L
SA TARGET DNA: NEGATIVE
SARS-COV-2 RNA RESP QL NAA+PROBE: NEGATIVE
SARS-COV-2 RNA RESP QL NAA+PROBE: NEGATIVE
SODIUM SERPL-SCNC: 135 MMOL/L (ref 133–144)
SODIUM SERPL-SCNC: 137 MMOL/L (ref 133–144)
SODIUM SERPL-SCNC: 138 MMOL/L (ref 133–144)
SODIUM SERPL-SCNC: 139 MMOL/L (ref 133–144)
SODIUM SERPL-SCNC: 140 MMOL/L (ref 133–144)
SODIUM SERPL-SCNC: 140 MMOL/L (ref 133–144)
SODIUM SERPL-SCNC: 141 MMOL/L (ref 133–144)
SODIUM SERPL-SCNC: 141 MMOL/L (ref 133–144)
SODIUM SERPL-SCNC: 142 MMOL/L (ref 133–144)
SODIUM SERPL-SCNC: 144 MMOL/L (ref 133–144)
SP GR UR STRIP: 1 (ref 1–1.03)
SP GR UR STRIP: 1.01 (ref 1–1.03)
SPECIMEN EXPIRATION DATE: NORMAL
SYSTOLIC BLOOD PRESSURE - MUSE: NORMAL MMHG
T AXIS - MUSE: -24 DEGREES
T AXIS - MUSE: -34 DEGREES
T AXIS - MUSE: -51 DEGREES
T AXIS - MUSE: 0 DEGREES
T AXIS - MUSE: 12 DEGREES
TRANSITIONAL EPI: <1 /HPF
TRIGL SERPL-MCNC: 117 MG/DL
TROPONIN I SERPL HS-MCNC: 1155 NG/L
TROPONIN I SERPL HS-MCNC: 183 NG/L
TROPONIN I SERPL HS-MCNC: 1861 NG/L
TROPONIN I SERPL HS-MCNC: 2407 NG/L
TROPONIN I SERPL HS-MCNC: 248 NG/L
TROPONIN I SERPL HS-MCNC: 282 NG/L
TROPONIN I SERPL HS-MCNC: 3054 NG/L
TROPONIN I SERPL HS-MCNC: 325 NG/L
TROPONIN I SERPL HS-MCNC: 346 NG/L
TROPONIN I SERPL HS-MCNC: 3548 NG/L
TROPONIN I SERPL HS-MCNC: 424 NG/L
TROPONIN I SERPL HS-MCNC: 437 NG/L
TROPONIN I SERPL HS-MCNC: 458 NG/L
TROPONIN I SERPL HS-MCNC: 549 NG/L
TROPONIN I SERPL HS-MCNC: 57 NG/L
TROPONIN I SERPL HS-MCNC: 581 NG/L
TROPONIN I SERPL HS-MCNC: 646 NG/L
TROPONIN I SERPL HS-MCNC: 683 NG/L
TROPONIN I SERPL HS-MCNC: 775 NG/L
TROPONIN I SERPL HS-MCNC: 895 NG/L
UFH PPP CHRO-ACNC: 0.19 IU/ML
UFH PPP CHRO-ACNC: 0.22 IU/ML
UFH PPP CHRO-ACNC: 0.24 IU/ML
UFH PPP CHRO-ACNC: 0.25 IU/ML
UFH PPP CHRO-ACNC: 0.28 IU/ML
UFH PPP CHRO-ACNC: 0.3 IU/ML
UFH PPP CHRO-ACNC: 0.35 IU/ML
UFH PPP CHRO-ACNC: 0.35 IU/ML
UFH PPP CHRO-ACNC: 0.38 IU/ML
UFH PPP CHRO-ACNC: 0.38 IU/ML
UFH PPP CHRO-ACNC: 0.4 IU/ML
UFH PPP CHRO-ACNC: 0.47 IU/ML
UFH PPP CHRO-ACNC: 0.47 IU/ML
UFH PPP CHRO-ACNC: 0.53 IU/ML
UFH PPP CHRO-ACNC: 0.9 IU/ML
UFH PPP CHRO-ACNC: >1.1 IU/ML
UROBILINOGEN UR STRIP-MCNC: NORMAL MG/DL
UROBILINOGEN UR STRIP-MCNC: NORMAL MG/DL
VALPROATE FREE SERPL-MCNC: 10.8 UG/ML
VALPROATE SERPL-MCNC: 55 MG/L
VENTRICULAR RATE- MUSE: 104 BPM
VENTRICULAR RATE- MUSE: 106 BPM
VENTRICULAR RATE- MUSE: 129 BPM
VENTRICULAR RATE- MUSE: 81 BPM
VENTRICULAR RATE- MUSE: 98 BPM
WBC # BLD AUTO: 11.4 10E3/UL (ref 4–11)
WBC # BLD AUTO: 13.8 10E3/UL (ref 4–11)
WBC # BLD AUTO: 15.4 10E3/UL (ref 4–11)
WBC # BLD AUTO: 15.8 10E3/UL (ref 4–11)
WBC # BLD AUTO: 16.4 10E3/UL (ref 4–11)
WBC # BLD AUTO: 5.6 10E3/UL (ref 4–11)
WBC # BLD AUTO: 6.9 10E3/UL (ref 4–11)
WBC # BLD AUTO: 7.4 10E3/UL (ref 4–11)
WBC # BLD AUTO: 7.6 10E3/UL (ref 4–11)
WBC # BLD AUTO: 8.1 10E3/UL (ref 4–11)
WBC # BLD AUTO: 8.2 10E3/UL (ref 4–11)
WBC # BLD AUTO: 8.5 10E3/UL (ref 4–11)
WBC # BLD AUTO: 8.5 10E3/UL (ref 4–11)
WBC # BLD AUTO: 9.3 10E3/UL (ref 4–11)
WBC URINE: 2 /HPF
WBC URINE: <1 /HPF

## 2022-01-01 PROCEDURE — 250N000009 HC RX 250: Performed by: PHYSICIAN ASSISTANT

## 2022-01-01 PROCEDURE — 80354 DRUG SCREENING FENTANYL: CPT | Performed by: NURSE PRACTITIONER

## 2022-01-01 PROCEDURE — 250N000013 HC RX MED GY IP 250 OP 250 PS 637: Performed by: STUDENT IN AN ORGANIZED HEALTH CARE EDUCATION/TRAINING PROGRAM

## 2022-01-01 PROCEDURE — 999N000065 XR CHEST PORT 1 VIEW

## 2022-01-01 PROCEDURE — 86316 IMMUNOASSAY TUMOR OTHER: CPT | Performed by: NURSE PRACTITIONER

## 2022-01-01 PROCEDURE — 99207 PR APP CREDIT; MD BILLING SHARED VISIT: CPT | Performed by: NURSE PRACTITIONER

## 2022-01-01 PROCEDURE — 258N000003 HC RX IP 258 OP 636: Performed by: PSYCHIATRY & NEUROLOGY

## 2022-01-01 PROCEDURE — 85396 CLOTTING ASSAY WHOLE BLOOD: CPT | Performed by: NURSE PRACTITIONER

## 2022-01-01 PROCEDURE — 250N000009 HC RX 250: Performed by: NURSE PRACTITIONER

## 2022-01-01 PROCEDURE — 85520 HEPARIN ASSAY: CPT | Performed by: NURSE PRACTITIONER

## 2022-01-01 PROCEDURE — 250N000011 HC RX IP 250 OP 636: Performed by: NURSE PRACTITIONER

## 2022-01-01 PROCEDURE — 99291 CRITICAL CARE FIRST HOUR: CPT | Performed by: STUDENT IN AN ORGANIZED HEALTH CARE EDUCATION/TRAINING PROGRAM

## 2022-01-01 PROCEDURE — 82040 ASSAY OF SERUM ALBUMIN: CPT | Performed by: NURSE PRACTITIONER

## 2022-01-01 PROCEDURE — 250N000013 HC RX MED GY IP 250 OP 250 PS 637: Performed by: PHYSICIAN ASSISTANT

## 2022-01-01 PROCEDURE — 82330 ASSAY OF CALCIUM: CPT | Performed by: NURSE PRACTITIONER

## 2022-01-01 PROCEDURE — 93880 EXTRACRANIAL BILAT STUDY: CPT

## 2022-01-01 PROCEDURE — 250N000011 HC RX IP 250 OP 636: Performed by: PHYSICIAN ASSISTANT

## 2022-01-01 PROCEDURE — 82947 ASSAY GLUCOSE BLOOD QUANT: CPT | Performed by: NURSE PRACTITIONER

## 2022-01-01 PROCEDURE — 85730 THROMBOPLASTIN TIME PARTIAL: CPT | Performed by: NURSE PRACTITIONER

## 2022-01-01 PROCEDURE — 95718 EEG PHYS/QHP 2-12 HR W/VEEG: CPT | Performed by: PSYCHIATRY & NEUROLOGY

## 2022-01-01 PROCEDURE — 80053 COMPREHEN METABOLIC PANEL: CPT | Performed by: NURSE PRACTITIONER

## 2022-01-01 PROCEDURE — 999N000157 HC STATISTIC RCP TIME EA 10 MIN

## 2022-01-01 PROCEDURE — 272N000504 HC NEEDLE CR4

## 2022-01-01 PROCEDURE — 999N000128 HC STATISTIC PERIPHERAL IV START W/O US GUIDANCE

## 2022-01-01 PROCEDURE — 250N000011 HC RX IP 250 OP 636: Performed by: STUDENT IN AN ORGANIZED HEALTH CARE EDUCATION/TRAINING PROGRAM

## 2022-01-01 PROCEDURE — 85652 RBC SED RATE AUTOMATED: CPT | Performed by: NURSE PRACTITIONER

## 2022-01-01 PROCEDURE — 81001 URINALYSIS AUTO W/SCOPE: CPT | Performed by: PHYSICIAN ASSISTANT

## 2022-01-01 PROCEDURE — 250N000013 HC RX MED GY IP 250 OP 250 PS 637: Performed by: INTERNAL MEDICINE

## 2022-01-01 PROCEDURE — 99292 CRITICAL CARE ADDL 30 MIN: CPT

## 2022-01-01 PROCEDURE — 272N000007 HC KIT ART LINE INSERTION

## 2022-01-01 PROCEDURE — 93005 ELECTROCARDIOGRAM TRACING: CPT

## 2022-01-01 PROCEDURE — 272N000563 HC SHEATH CR14

## 2022-01-01 PROCEDURE — 95714 VEEG EA 12-26 HR UNMNTR: CPT

## 2022-01-01 PROCEDURE — 84145 PROCALCITONIN (PCT): CPT | Performed by: NURSE PRACTITIONER

## 2022-01-01 PROCEDURE — 83605 ASSAY OF LACTIC ACID: CPT | Performed by: NURSE PRACTITIONER

## 2022-01-01 PROCEDURE — 85347 COAGULATION TIME ACTIVATED: CPT

## 2022-01-01 PROCEDURE — 36415 COLL VENOUS BLD VENIPUNCTURE: CPT | Performed by: EMERGENCY MEDICINE

## 2022-01-01 PROCEDURE — 84155 ASSAY OF PROTEIN SERUM: CPT | Performed by: NURSE PRACTITIONER

## 2022-01-01 PROCEDURE — 85520 HEPARIN ASSAY: CPT | Performed by: INTERNAL MEDICINE

## 2022-01-01 PROCEDURE — 86140 C-REACTIVE PROTEIN: CPT | Performed by: NURSE PRACTITIONER

## 2022-01-01 PROCEDURE — 31500 INSERT EMERGENCY AIRWAY: CPT

## 2022-01-01 PROCEDURE — 44500 INTRO GASTROINTESTINAL TUBE: CPT

## 2022-01-01 PROCEDURE — 99358 PROLONG SERVICE W/O CONTACT: CPT | Performed by: NURSE PRACTITIONER

## 2022-01-01 PROCEDURE — 83605 ASSAY OF LACTIC ACID: CPT | Performed by: PHYSICIAN ASSISTANT

## 2022-01-01 PROCEDURE — 85384 FIBRINOGEN ACTIVITY: CPT | Performed by: NURSE PRACTITIONER

## 2022-01-01 PROCEDURE — 82533 TOTAL CORTISOL: CPT | Performed by: NURSE PRACTITIONER

## 2022-01-01 PROCEDURE — 250N000011 HC RX IP 250 OP 636: Performed by: INTERNAL MEDICINE

## 2022-01-01 PROCEDURE — 85027 COMPLETE CBC AUTOMATED: CPT | Performed by: NURSE PRACTITIONER

## 2022-01-01 PROCEDURE — 80164 ASSAY DIPROPYLACETIC ACD TOT: CPT | Performed by: INTERNAL MEDICINE

## 2022-01-01 PROCEDURE — 85610 PROTHROMBIN TIME: CPT | Performed by: INTERNAL MEDICINE

## 2022-01-01 PROCEDURE — 80320 DRUG SCREEN QUANTALCOHOLS: CPT | Performed by: NURSE PRACTITIONER

## 2022-01-01 PROCEDURE — 200N000002 HC R&B ICU UMMC

## 2022-01-01 PROCEDURE — 84450 TRANSFERASE (AST) (SGOT): CPT | Performed by: NURSE PRACTITIONER

## 2022-01-01 PROCEDURE — 99233 SBSQ HOSP IP/OBS HIGH 50: CPT | Performed by: STUDENT IN AN ORGANIZED HEALTH CARE EDUCATION/TRAINING PROGRAM

## 2022-01-01 PROCEDURE — C9113 INJ PANTOPRAZOLE SODIUM, VIA: HCPCS | Performed by: NURSE PRACTITIONER

## 2022-01-01 PROCEDURE — 71045 X-RAY EXAM CHEST 1 VIEW: CPT

## 2022-01-01 PROCEDURE — 83735 ASSAY OF MAGNESIUM: CPT | Performed by: NURSE PRACTITIONER

## 2022-01-01 PROCEDURE — 99239 HOSP IP/OBS DSCHRG MGMT >30: CPT | Performed by: NURSE PRACTITIONER

## 2022-01-01 PROCEDURE — 999N000015 HC STATISTIC ARTERIAL MONITORING DAILY

## 2022-01-01 PROCEDURE — 94002 VENT MGMT INPAT INIT DAY: CPT

## 2022-01-01 PROCEDURE — 75743 ARTERY X-RAYS LUNGS: CPT

## 2022-01-01 PROCEDURE — 76937 US GUIDE VASCULAR ACCESS: CPT | Mod: 26 | Performed by: RADIOLOGY

## 2022-01-01 PROCEDURE — 84478 ASSAY OF TRIGLYCERIDES: CPT | Performed by: INTERNAL MEDICINE

## 2022-01-01 PROCEDURE — 36415 COLL VENOUS BLD VENIPUNCTURE: CPT | Performed by: INTERNAL MEDICINE

## 2022-01-01 PROCEDURE — 84484 ASSAY OF TROPONIN QUANT: CPT | Performed by: NURSE PRACTITIONER

## 2022-01-01 PROCEDURE — 74177 CT ABD & PELVIS W/CONTRAST: CPT

## 2022-01-01 PROCEDURE — 82805 BLOOD GASES W/O2 SATURATION: CPT | Performed by: NURSE PRACTITIONER

## 2022-01-01 PROCEDURE — 97530 THERAPEUTIC ACTIVITIES: CPT | Mod: GP

## 2022-01-01 PROCEDURE — 85610 PROTHROMBIN TIME: CPT | Performed by: NURSE PRACTITIONER

## 2022-01-01 PROCEDURE — C1769 GUIDE WIRE: HCPCS

## 2022-01-01 PROCEDURE — 87205 SMEAR GRAM STAIN: CPT | Performed by: NURSE PRACTITIONER

## 2022-01-01 PROCEDURE — U0005 INFEC AGEN DETEC AMPLI PROBE: HCPCS | Performed by: STUDENT IN AN ORGANIZED HEALTH CARE EDUCATION/TRAINING PROGRAM

## 2022-01-01 PROCEDURE — 99232 SBSQ HOSP IP/OBS MODERATE 35: CPT | Performed by: NURSE PRACTITIONER

## 2022-01-01 PROCEDURE — 02CQ3ZZ EXTIRPATION OF MATTER FROM RIGHT PULMONARY ARTERY, PERCUTANEOUS APPROACH: ICD-10-PCS | Performed by: RADIOLOGY

## 2022-01-01 PROCEDURE — 93010 ELECTROCARDIOGRAM REPORT: CPT | Mod: 59 | Performed by: INTERNAL MEDICINE

## 2022-01-01 PROCEDURE — 85730 THROMBOPLASTIN TIME PARTIAL: CPT | Performed by: EMERGENCY MEDICINE

## 2022-01-01 PROCEDURE — 258N000003 HC RX IP 258 OP 636: Performed by: EMERGENCY MEDICINE

## 2022-01-01 PROCEDURE — 87635 SARS-COV-2 COVID-19 AMP PRB: CPT | Performed by: EMERGENCY MEDICINE

## 2022-01-01 PROCEDURE — 84100 ASSAY OF PHOSPHORUS: CPT | Performed by: INTERNAL MEDICINE

## 2022-01-01 PROCEDURE — 36415 COLL VENOUS BLD VENIPUNCTURE: CPT | Performed by: NURSE PRACTITIONER

## 2022-01-01 PROCEDURE — 37184 PRIM ART M-THRMBC 1ST VSL: CPT | Mod: 50 | Performed by: RADIOLOGY

## 2022-01-01 PROCEDURE — 85396 CLOTTING ASSAY WHOLE BLOOD: CPT | Performed by: INTERNAL MEDICINE

## 2022-01-01 PROCEDURE — 5A1945Z RESPIRATORY VENTILATION, 24-96 CONSECUTIVE HOURS: ICD-10-PCS | Performed by: INTERNAL MEDICINE

## 2022-01-01 PROCEDURE — 120N000011 HC R&B TRANSPLANT UMMC

## 2022-01-01 PROCEDURE — 85027 COMPLETE CBC AUTOMATED: CPT | Performed by: RADIOLOGY

## 2022-01-01 PROCEDURE — 70450 CT HEAD/BRAIN W/O DYE: CPT

## 2022-01-01 PROCEDURE — 95720 EEG PHY/QHP EA INCR W/VEEG: CPT | Mod: GC | Performed by: PSYCHIATRY & NEUROLOGY

## 2022-01-01 PROCEDURE — 71045 X-RAY EXAM CHEST 1 VIEW: CPT | Mod: 26 | Performed by: RADIOLOGY

## 2022-01-01 PROCEDURE — 84703 CHORIONIC GONADOTROPIN ASSAY: CPT | Performed by: EMERGENCY MEDICINE

## 2022-01-01 PROCEDURE — 94003 VENT MGMT INPAT SUBQ DAY: CPT

## 2022-01-01 PROCEDURE — 250N000009 HC RX 250: Performed by: STUDENT IN AN ORGANIZED HEALTH CARE EDUCATION/TRAINING PROGRAM

## 2022-01-01 PROCEDURE — 250N000009 HC RX 250: Performed by: PSYCHIATRY & NEUROLOGY

## 2022-01-01 PROCEDURE — 6A4Z0ZZ HYPOTHERMIA, SINGLE: ICD-10-PCS | Performed by: INTERNAL MEDICINE

## 2022-01-01 PROCEDURE — 84075 ASSAY ALKALINE PHOSPHATASE: CPT | Performed by: NURSE PRACTITIONER

## 2022-01-01 PROCEDURE — 99291 CRITICAL CARE FIRST HOUR: CPT | Performed by: INTERNAL MEDICINE

## 2022-01-01 PROCEDURE — 258N000003 HC RX IP 258 OP 636: Performed by: PHYSICIAN ASSISTANT

## 2022-01-01 PROCEDURE — 74018 RADEX ABDOMEN 1 VIEW: CPT | Mod: 26 | Performed by: RADIOLOGY

## 2022-01-01 PROCEDURE — 87040 BLOOD CULTURE FOR BACTERIA: CPT | Performed by: PHYSICIAN ASSISTANT

## 2022-01-01 PROCEDURE — 92950 HEART/LUNG RESUSCITATION CPR: CPT

## 2022-01-01 PROCEDURE — 258N000003 HC RX IP 258 OP 636: Performed by: NURSE PRACTITIONER

## 2022-01-01 PROCEDURE — 93010 ELECTROCARDIOGRAM REPORT: CPT | Performed by: INTERNAL MEDICINE

## 2022-01-01 PROCEDURE — 36014 PLACE CATHETER IN ARTERY: CPT | Mod: 50

## 2022-01-01 PROCEDURE — 82947 ASSAY GLUCOSE BLOOD QUANT: CPT | Performed by: PHYSICIAN ASSISTANT

## 2022-01-01 PROCEDURE — 36415 COLL VENOUS BLD VENIPUNCTURE: CPT | Performed by: PHYSICIAN ASSISTANT

## 2022-01-01 PROCEDURE — 36620 INSERTION CATHETER ARTERY: CPT

## 2022-01-01 PROCEDURE — 82077 ASSAY SPEC XCP UR&BREATH IA: CPT | Performed by: EMERGENCY MEDICINE

## 2022-01-01 PROCEDURE — 250N000009 HC RX 250: Performed by: EMERGENCY MEDICINE

## 2022-01-01 PROCEDURE — 74018 RADEX ABDOMEN 1 VIEW: CPT

## 2022-01-01 PROCEDURE — 86901 BLOOD TYPING SEROLOGIC RH(D): CPT | Performed by: NURSE PRACTITIONER

## 2022-01-01 PROCEDURE — 80347 BENZODIAZEPINES 13 OR MORE: CPT | Performed by: NURSE PRACTITIONER

## 2022-01-01 PROCEDURE — 82805 BLOOD GASES W/O2 SATURATION: CPT | Performed by: STUDENT IN AN ORGANIZED HEALTH CARE EDUCATION/TRAINING PROGRAM

## 2022-01-01 PROCEDURE — 36014 PLACE CATHETER IN ARTERY: CPT | Mod: 50 | Performed by: RADIOLOGY

## 2022-01-01 PROCEDURE — 02CR3ZZ EXTIRPATION OF MATTER FROM LEFT PULMONARY ARTERY, PERCUTANEOUS APPROACH: ICD-10-PCS | Performed by: RADIOLOGY

## 2022-01-01 PROCEDURE — 250N000011 HC RX IP 250 OP 636

## 2022-01-01 PROCEDURE — 96375 TX/PRO/DX INJ NEW DRUG ADDON: CPT | Mod: 59

## 2022-01-01 PROCEDURE — 272N000143 HC KIT CR3

## 2022-01-01 PROCEDURE — C9803 HOPD COVID-19 SPEC COLLECT: HCPCS

## 2022-01-01 PROCEDURE — 999N000065 XR ABDOMEN PORT 1 VIEWS

## 2022-01-01 PROCEDURE — 82248 BILIRUBIN DIRECT: CPT | Performed by: INTERNAL MEDICINE

## 2022-01-01 PROCEDURE — 96365 THER/PROPH/DIAG IV INF INIT: CPT | Mod: 59

## 2022-01-01 PROCEDURE — 86901 BLOOD TYPING SEROLOGIC RH(D): CPT | Performed by: INTERNAL MEDICINE

## 2022-01-01 PROCEDURE — 99291 CRITICAL CARE FIRST HOUR: CPT | Mod: AI | Performed by: INTERNAL MEDICINE

## 2022-01-01 PROCEDURE — 84132 ASSAY OF SERUM POTASSIUM: CPT | Performed by: INTERNAL MEDICINE

## 2022-01-01 PROCEDURE — 85610 PROTHROMBIN TIME: CPT | Performed by: STUDENT IN AN ORGANIZED HEALTH CARE EDUCATION/TRAINING PROGRAM

## 2022-01-01 PROCEDURE — 70551 MRI BRAIN STEM W/O DYE: CPT | Mod: 26 | Performed by: RADIOLOGY

## 2022-01-01 PROCEDURE — 99222 1ST HOSP IP/OBS MODERATE 55: CPT | Performed by: NURSE PRACTITIONER

## 2022-01-01 PROCEDURE — 99292 CRITICAL CARE ADDL 30 MIN: CPT | Mod: 25 | Performed by: INTERNAL MEDICINE

## 2022-01-01 PROCEDURE — 99233 SBSQ HOSP IP/OBS HIGH 50: CPT | Mod: 25 | Performed by: PSYCHIATRY & NEUROLOGY

## 2022-01-01 PROCEDURE — 96368 THER/DIAG CONCURRENT INF: CPT | Mod: 59

## 2022-01-01 PROCEDURE — 95720 EEG PHY/QHP EA INCR W/VEEG: CPT | Performed by: PSYCHIATRY & NEUROLOGY

## 2022-01-01 PROCEDURE — 83605 ASSAY OF LACTIC ACID: CPT | Mod: 91 | Performed by: EMERGENCY MEDICINE

## 2022-01-01 PROCEDURE — 85730 THROMBOPLASTIN TIME PARTIAL: CPT | Performed by: INTERNAL MEDICINE

## 2022-01-01 PROCEDURE — 250N000011 HC RX IP 250 OP 636: Performed by: EMERGENCY MEDICINE

## 2022-01-01 PROCEDURE — 99221 1ST HOSP IP/OBS SF/LOW 40: CPT | Performed by: PSYCHIATRY & NEUROLOGY

## 2022-01-01 PROCEDURE — 83615 LACTATE (LD) (LDH) ENZYME: CPT | Performed by: NURSE PRACTITIONER

## 2022-01-01 PROCEDURE — 93005 ELECTROCARDIOGRAM TRACING: CPT | Mod: 59

## 2022-01-01 PROCEDURE — 83735 ASSAY OF MAGNESIUM: CPT | Performed by: INTERNAL MEDICINE

## 2022-01-01 PROCEDURE — 86901 BLOOD TYPING SEROLOGIC RH(D): CPT | Performed by: EMERGENCY MEDICINE

## 2022-01-01 PROCEDURE — C9113 INJ PANTOPRAZOLE SODIUM, VIA: HCPCS | Performed by: EMERGENCY MEDICINE

## 2022-01-01 PROCEDURE — 82803 BLOOD GASES ANY COMBINATION: CPT | Mod: 91

## 2022-01-01 PROCEDURE — 99233 SBSQ HOSP IP/OBS HIGH 50: CPT | Performed by: INTERNAL MEDICINE

## 2022-01-01 PROCEDURE — 250N000013 HC RX MED GY IP 250 OP 250 PS 637: Performed by: NURSE PRACTITIONER

## 2022-01-01 PROCEDURE — 85014 HEMATOCRIT: CPT | Performed by: NURSE PRACTITIONER

## 2022-01-01 PROCEDURE — 99292 CRITICAL CARE ADDL 30 MIN: CPT | Performed by: INTERNAL MEDICINE

## 2022-01-01 PROCEDURE — 99233 SBSQ HOSP IP/OBS HIGH 50: CPT | Mod: GC | Performed by: STUDENT IN AN ORGANIZED HEALTH CARE EDUCATION/TRAINING PROGRAM

## 2022-01-01 PROCEDURE — 255N000002 HC RX 255 OP 636: Performed by: RADIOLOGY

## 2022-01-01 PROCEDURE — 82803 BLOOD GASES ANY COMBINATION: CPT | Performed by: STUDENT IN AN ORGANIZED HEALTH CARE EDUCATION/TRAINING PROGRAM

## 2022-01-01 PROCEDURE — 86850 RBC ANTIBODY SCREEN: CPT | Performed by: NURSE PRACTITIONER

## 2022-01-01 PROCEDURE — 99233 SBSQ HOSP IP/OBS HIGH 50: CPT | Performed by: NURSE PRACTITIONER

## 2022-01-01 PROCEDURE — 99207 PR APP CREDIT; MD BILLING SHARED VISIT: CPT | Performed by: INTERNAL MEDICINE

## 2022-01-01 PROCEDURE — 85004 AUTOMATED DIFF WBC COUNT: CPT | Performed by: STUDENT IN AN ORGANIZED HEALTH CARE EDUCATION/TRAINING PROGRAM

## 2022-01-01 PROCEDURE — 96376 TX/PRO/DX INJ SAME DRUG ADON: CPT

## 2022-01-01 PROCEDURE — 97163 PT EVAL HIGH COMPLEX 45 MIN: CPT | Mod: GP

## 2022-01-01 PROCEDURE — 87086 URINE CULTURE/COLONY COUNT: CPT | Performed by: PHYSICIAN ASSISTANT

## 2022-01-01 PROCEDURE — 80053 COMPREHEN METABOLIC PANEL: CPT | Performed by: EMERGENCY MEDICINE

## 2022-01-01 PROCEDURE — 272N000566 HC SHEATH CR3

## 2022-01-01 PROCEDURE — 85384 FIBRINOGEN ACTIVITY: CPT | Performed by: INTERNAL MEDICINE

## 2022-01-01 PROCEDURE — 85520 HEPARIN ASSAY: CPT | Performed by: PHYSICIAN ASSISTANT

## 2022-01-01 PROCEDURE — 80307 DRUG TEST PRSMV CHEM ANLYZR: CPT | Performed by: NURSE PRACTITIONER

## 2022-01-01 PROCEDURE — 3E043XZ INTRODUCTION OF VASOPRESSOR INTO CENTRAL VEIN, PERCUTANEOUS APPROACH: ICD-10-PCS | Performed by: INTERNAL MEDICINE

## 2022-01-01 PROCEDURE — 93306 TTE W/DOPPLER COMPLETE: CPT | Mod: 26 | Performed by: INTERNAL MEDICINE

## 2022-01-01 PROCEDURE — 87641 MR-STAPH DNA AMP PROBE: CPT | Performed by: INTERNAL MEDICINE

## 2022-01-01 PROCEDURE — 85610 PROTHROMBIN TIME: CPT | Performed by: EMERGENCY MEDICINE

## 2022-01-01 PROCEDURE — C1887 CATHETER, GUIDING: HCPCS

## 2022-01-01 PROCEDURE — 84132 ASSAY OF SERUM POTASSIUM: CPT

## 2022-01-01 PROCEDURE — 37184 PRIM ART M-THRMBC 1ST VSL: CPT | Mod: 50

## 2022-01-01 PROCEDURE — 82805 BLOOD GASES W/O2 SATURATION: CPT | Performed by: INTERNAL MEDICINE

## 2022-01-01 PROCEDURE — 250N000011 HC RX IP 250 OP 636: Performed by: RADIOLOGY

## 2022-01-01 PROCEDURE — 93880 EXTRACRANIAL BILAT STUDY: CPT | Mod: 26 | Performed by: RADIOLOGY

## 2022-01-01 PROCEDURE — 70551 MRI BRAIN STEM W/O DYE: CPT

## 2022-01-01 PROCEDURE — 258N000003 HC RX IP 258 OP 636: Performed by: STUDENT IN AN ORGANIZED HEALTH CARE EDUCATION/TRAINING PROGRAM

## 2022-01-01 PROCEDURE — 85027 COMPLETE CBC AUTOMATED: CPT | Performed by: EMERGENCY MEDICINE

## 2022-01-01 PROCEDURE — 84100 ASSAY OF PHOSPHORUS: CPT | Performed by: NURSE PRACTITIONER

## 2022-01-01 PROCEDURE — C1757 CATH, THROMBECTOMY/EMBOLECT: HCPCS

## 2022-01-01 PROCEDURE — 83735 ASSAY OF MAGNESIUM: CPT | Performed by: EMERGENCY MEDICINE

## 2022-01-01 PROCEDURE — 80165 DIPROPYLACETIC ACID FREE: CPT | Performed by: INTERNAL MEDICINE

## 2022-01-01 PROCEDURE — 87040 BLOOD CULTURE FOR BACTERIA: CPT | Performed by: INTERNAL MEDICINE

## 2022-01-01 PROCEDURE — 99291 CRITICAL CARE FIRST HOUR: CPT | Mod: 25

## 2022-01-01 PROCEDURE — 258N000003 HC RX IP 258 OP 636: Performed by: INTERNAL MEDICINE

## 2022-01-01 PROCEDURE — 83735 ASSAY OF MAGNESIUM: CPT | Performed by: RADIOLOGY

## 2022-01-01 PROCEDURE — 99152 MOD SED SAME PHYS/QHP 5/>YRS: CPT | Performed by: RADIOLOGY

## 2022-01-01 PROCEDURE — 82805 BLOOD GASES W/O2 SATURATION: CPT | Performed by: EMERGENCY MEDICINE

## 2022-01-01 PROCEDURE — 82803 BLOOD GASES ANY COMBINATION: CPT | Performed by: RADIOLOGY

## 2022-01-01 PROCEDURE — 80053 COMPREHEN METABOLIC PANEL: CPT | Performed by: RADIOLOGY

## 2022-01-01 PROCEDURE — 95711 VEEG 2-12 HR UNMONITORED: CPT

## 2022-01-01 PROCEDURE — 84484 ASSAY OF TROPONIN QUANT: CPT | Performed by: EMERGENCY MEDICINE

## 2022-01-01 PROCEDURE — 83036 HEMOGLOBIN GLYCOSYLATED A1C: CPT | Performed by: NURSE PRACTITIONER

## 2022-01-01 PROCEDURE — 81001 URINALYSIS AUTO W/SCOPE: CPT | Performed by: NURSE PRACTITIONER

## 2022-01-01 PROCEDURE — 82550 ASSAY OF CK (CPK): CPT | Performed by: STUDENT IN AN ORGANIZED HEALTH CARE EDUCATION/TRAINING PROGRAM

## 2022-01-01 PROCEDURE — 93306 TTE W/DOPPLER COMPLETE: CPT

## 2022-01-01 PROCEDURE — 36556 INSERT NON-TUNNEL CV CATH: CPT

## 2022-01-01 PROCEDURE — 250N000009 HC RX 250: Performed by: RADIOLOGY

## 2022-01-01 PROCEDURE — 83605 ASSAY OF LACTIC ACID: CPT | Performed by: EMERGENCY MEDICINE

## 2022-01-01 RX ORDER — GLYCOPYRROLATE 0.2 MG/ML
0.1 INJECTION, SOLUTION INTRAMUSCULAR; INTRAVENOUS EVERY 4 HOURS PRN
Status: DISCONTINUED | OUTPATIENT
Start: 2022-01-01 | End: 2022-01-01

## 2022-01-01 RX ORDER — LIDOCAINE HYDROCHLORIDE 20 MG/ML
5 SOLUTION OROPHARYNGEAL ONCE
Status: COMPLETED | OUTPATIENT
Start: 2022-01-01 | End: 2022-01-01

## 2022-01-01 RX ORDER — PIPERACILLIN SODIUM, TAZOBACTAM SODIUM 3; .375 G/15ML; G/15ML
3.38 INJECTION, POWDER, LYOPHILIZED, FOR SOLUTION INTRAVENOUS EVERY 6 HOURS
Status: CANCELLED | OUTPATIENT
Start: 2022-01-01 | End: 2022-01-01

## 2022-01-01 RX ORDER — HYDRALAZINE HYDROCHLORIDE 20 MG/ML
10 INJECTION INTRAMUSCULAR; INTRAVENOUS EVERY 4 HOURS PRN
Status: DISCONTINUED | OUTPATIENT
Start: 2022-01-01 | End: 2022-01-01

## 2022-01-01 RX ORDER — NALOXONE HYDROCHLORIDE 1 MG/ML
2 INJECTION INTRAMUSCULAR; INTRAVENOUS; SUBCUTANEOUS ONCE
Status: COMPLETED | OUTPATIENT
Start: 2022-01-01 | End: 2022-01-01

## 2022-01-01 RX ORDER — LORAZEPAM 2 MG/ML
2 INJECTION INTRAMUSCULAR EVERY 4 HOURS
Status: DISCONTINUED | OUTPATIENT
Start: 2022-01-01 | End: 2022-01-01

## 2022-01-01 RX ORDER — POTASSIUM CHLORIDE 1.5 G/1.58G
20 POWDER, FOR SOLUTION ORAL ONCE
Status: COMPLETED | OUTPATIENT
Start: 2022-01-01 | End: 2022-01-01

## 2022-01-01 RX ORDER — DEXMEDETOMIDINE HYDROCHLORIDE 4 UG/ML
.1-1.2 INJECTION, SOLUTION INTRAVENOUS CONTINUOUS
Status: DISCONTINUED | OUTPATIENT
Start: 2022-01-01 | End: 2022-01-01

## 2022-01-01 RX ORDER — MIDAZOLAM HCL IN 0.9 % NACL/PF 1 MG/ML
2 PLASTIC BAG, INJECTION (ML) INTRAVENOUS CONTINUOUS
Status: DISCONTINUED | OUTPATIENT
Start: 2022-01-01 | End: 2022-01-01 | Stop reason: HOSPADM

## 2022-01-01 RX ORDER — MIDAZOLAM HCL IN 0.9 % NACL/PF 1 MG/ML
1-8 PLASTIC BAG, INJECTION (ML) INTRAVENOUS CONTINUOUS
Status: DISCONTINUED | OUTPATIENT
Start: 2022-01-01 | End: 2022-01-01

## 2022-01-01 RX ORDER — CETIRIZINE HYDROCHLORIDE 10 MG/1
10 TABLET ORAL DAILY
COMMUNITY

## 2022-01-01 RX ORDER — HYDRALAZINE HYDROCHLORIDE 20 MG/ML
INJECTION INTRAMUSCULAR; INTRAVENOUS
Status: COMPLETED
Start: 2022-01-01 | End: 2022-01-01

## 2022-01-01 RX ORDER — DIPHENHYDRAMINE HCL 25 MG
25 CAPSULE ORAL
COMMUNITY

## 2022-01-01 RX ORDER — MAGNESIUM SULFATE HEPTAHYDRATE 40 MG/ML
4 INJECTION, SOLUTION INTRAVENOUS EVERY 4 HOURS PRN
Status: DISCONTINUED | OUTPATIENT
Start: 2022-01-01 | End: 2022-01-01

## 2022-01-01 RX ORDER — HEPARIN SODIUM 10000 [USP'U]/100ML
0-5000 INJECTION, SOLUTION INTRAVENOUS CONTINUOUS
Status: DISCONTINUED | OUTPATIENT
Start: 2022-01-01 | End: 2022-01-01

## 2022-01-01 RX ORDER — MIDAZOLAM HCL IN 0.9 % NACL/PF 1 MG/ML
1-8 PLASTIC BAG, INJECTION (ML) INTRAVENOUS CONTINUOUS
Status: CANCELLED | OUTPATIENT
Start: 2022-01-01

## 2022-01-01 RX ORDER — NALOXONE HYDROCHLORIDE 0.4 MG/ML
0.4 INJECTION, SOLUTION INTRAMUSCULAR; INTRAVENOUS; SUBCUTANEOUS
Status: DISCONTINUED | OUTPATIENT
Start: 2022-01-01 | End: 2022-01-01

## 2022-01-01 RX ORDER — FLUTICASONE PROPIONATE 50 MCG
2 SPRAY, SUSPENSION (ML) NASAL DAILY
COMMUNITY

## 2022-01-01 RX ORDER — NALOXONE HYDROCHLORIDE 0.4 MG/ML
0.2 INJECTION, SOLUTION INTRAMUSCULAR; INTRAVENOUS; SUBCUTANEOUS
Status: DISCONTINUED | OUTPATIENT
Start: 2022-01-01 | End: 2022-01-01

## 2022-01-01 RX ORDER — PROPOFOL 10 MG/ML
INJECTION, EMULSION INTRAVENOUS
Status: COMPLETED
Start: 2022-01-01 | End: 2022-01-01

## 2022-01-01 RX ORDER — DEXTROSE MONOHYDRATE 100 MG/ML
INJECTION, SOLUTION INTRAVENOUS CONTINUOUS PRN
Status: DISCONTINUED | OUTPATIENT
Start: 2022-01-01 | End: 2022-01-01

## 2022-01-01 RX ORDER — PROPOFOL 10 MG/ML
5-75 INJECTION, EMULSION INTRAVENOUS CONTINUOUS
Status: DISCONTINUED | OUTPATIENT
Start: 2022-01-01 | End: 2022-01-01 | Stop reason: HOSPADM

## 2022-01-01 RX ORDER — PROCHLORPERAZINE MALEATE 5 MG
10 TABLET ORAL EVERY 6 HOURS PRN
Status: DISCONTINUED | OUTPATIENT
Start: 2022-01-01 | End: 2022-01-01 | Stop reason: HOSPADM

## 2022-01-01 RX ORDER — ONDANSETRON 2 MG/ML
4 INJECTION INTRAMUSCULAR; INTRAVENOUS EVERY 6 HOURS PRN
Status: DISCONTINUED | OUTPATIENT
Start: 2022-01-01 | End: 2022-01-01 | Stop reason: HOSPADM

## 2022-01-01 RX ORDER — QUETIAPINE FUMARATE 25 MG/1
25 TABLET, FILM COATED ORAL 2 TIMES DAILY
Status: DISCONTINUED | OUTPATIENT
Start: 2022-01-01 | End: 2022-01-01

## 2022-01-01 RX ORDER — OLANZAPINE 5 MG/1
5 TABLET, ORALLY DISINTEGRATING ORAL EVERY 6 HOURS
Status: DISCONTINUED | OUTPATIENT
Start: 2022-01-01 | End: 2022-01-01

## 2022-01-01 RX ORDER — HALOPERIDOL 5 MG/ML
INJECTION INTRAMUSCULAR
Status: COMPLETED
Start: 2022-01-01 | End: 2022-01-01

## 2022-01-01 RX ORDER — MAGNESIUM SULFATE HEPTAHYDRATE 40 MG/ML
2 INJECTION, SOLUTION INTRAVENOUS DAILY PRN
Status: CANCELLED | OUTPATIENT
Start: 2022-01-01

## 2022-01-01 RX ORDER — CALCIUM GLUCONATE 94 MG/ML
1 INJECTION, SOLUTION INTRAVENOUS ONCE
Status: COMPLETED | OUTPATIENT
Start: 2022-01-01 | End: 2022-01-01

## 2022-01-01 RX ORDER — PIPERACILLIN SODIUM, TAZOBACTAM SODIUM 3; .375 G/15ML; G/15ML
3.38 INJECTION, POWDER, LYOPHILIZED, FOR SOLUTION INTRAVENOUS EVERY 6 HOURS
Status: DISCONTINUED | OUTPATIENT
Start: 2022-01-01 | End: 2022-01-01

## 2022-01-01 RX ORDER — IOPAMIDOL 755 MG/ML
500 INJECTION, SOLUTION INTRAVASCULAR ONCE
Status: COMPLETED | OUTPATIENT
Start: 2022-01-01 | End: 2022-01-01

## 2022-01-01 RX ORDER — MAGNESIUM SULFATE HEPTAHYDRATE 40 MG/ML
4 INJECTION, SOLUTION INTRAVENOUS EVERY 4 HOURS PRN
Status: CANCELLED | OUTPATIENT
Start: 2022-01-01

## 2022-01-01 RX ORDER — OXYCODONE HYDROCHLORIDE 5 MG/1
5 TABLET ORAL EVERY 4 HOURS PRN
Status: DISCONTINUED | OUTPATIENT
Start: 2022-01-01 | End: 2022-01-01

## 2022-01-01 RX ORDER — LIDOCAINE HYDROCHLORIDE 10 MG/ML
1-30 INJECTION, SOLUTION EPIDURAL; INFILTRATION; INTRACAUDAL; PERINEURAL
Status: COMPLETED | OUTPATIENT
Start: 2022-01-01 | End: 2022-01-01

## 2022-01-01 RX ORDER — HALOPERIDOL 5 MG/ML
2-4 INJECTION INTRAMUSCULAR
Status: DISCONTINUED | OUTPATIENT
Start: 2022-01-01 | End: 2022-01-01

## 2022-01-01 RX ORDER — HEPARIN SODIUM 200 [USP'U]/100ML
1 INJECTION, SOLUTION INTRAVENOUS CONTINUOUS PRN
Status: DISCONTINUED | OUTPATIENT
Start: 2022-01-01 | End: 2022-01-01 | Stop reason: HOSPADM

## 2022-01-01 RX ORDER — LIDOCAINE 40 MG/G
CREAM TOPICAL
Status: CANCELLED | OUTPATIENT
Start: 2022-01-01

## 2022-01-01 RX ORDER — ACETAMINOPHEN 500 MG
1000 TABLET ORAL EVERY 6 HOURS PRN
Status: DISCONTINUED | OUTPATIENT
Start: 2022-01-01 | End: 2022-01-01

## 2022-01-01 RX ORDER — LORAZEPAM 2 MG/ML
2 INJECTION INTRAMUSCULAR ONCE
Status: COMPLETED | OUTPATIENT
Start: 2022-01-01 | End: 2022-01-01

## 2022-01-01 RX ORDER — DIPHENHYDRAMINE HYDROCHLORIDE 50 MG/ML
25 INJECTION INTRAMUSCULAR; INTRAVENOUS EVERY 6 HOURS PRN
Status: DISCONTINUED | OUTPATIENT
Start: 2022-01-01 | End: 2022-01-01

## 2022-01-01 RX ORDER — LORAZEPAM 2 MG/ML
1 INJECTION INTRAMUSCULAR
Status: DISCONTINUED | OUTPATIENT
Start: 2022-01-01 | End: 2022-01-01

## 2022-01-01 RX ORDER — POLYETHYLENE GLYCOL 3350 17 G/17G
17 POWDER, FOR SOLUTION ORAL DAILY
Status: DISCONTINUED | OUTPATIENT
Start: 2022-01-01 | End: 2022-01-01

## 2022-01-01 RX ORDER — LORAZEPAM 2 MG/ML
2 INJECTION INTRAMUSCULAR
Status: DISCONTINUED | OUTPATIENT
Start: 2022-01-01 | End: 2022-01-01

## 2022-01-01 RX ORDER — PROCHLORPERAZINE 25 MG
25 SUPPOSITORY, RECTAL RECTAL EVERY 12 HOURS PRN
Status: DISCONTINUED | OUTPATIENT
Start: 2022-01-01 | End: 2022-01-01 | Stop reason: HOSPADM

## 2022-01-01 RX ORDER — MAGNESIUM SULFATE HEPTAHYDRATE 40 MG/ML
2 INJECTION, SOLUTION INTRAVENOUS DAILY PRN
Status: DISCONTINUED | OUTPATIENT
Start: 2022-01-01 | End: 2022-01-01

## 2022-01-01 RX ORDER — IODIXANOL 320 MG/ML
150 INJECTION, SOLUTION INTRAVASCULAR ONCE
Status: COMPLETED | OUTPATIENT
Start: 2022-01-01 | End: 2022-01-01

## 2022-01-01 RX ORDER — ALBUTEROL SULFATE 90 UG/1
6 AEROSOL, METERED RESPIRATORY (INHALATION) EVERY 6 HOURS PRN
Status: DISCONTINUED | OUTPATIENT
Start: 2022-01-01 | End: 2022-01-01

## 2022-01-01 RX ORDER — CALCIUM CHLORIDE 100 MG/ML
1 INJECTION INTRAVENOUS; INTRAVENTRICULAR ONCE
Status: DISCONTINUED | OUTPATIENT
Start: 2022-01-01 | End: 2022-01-01

## 2022-01-01 RX ORDER — LORAZEPAM 2 MG/ML
4 INJECTION INTRAMUSCULAR ONCE
Status: COMPLETED | OUTPATIENT
Start: 2022-01-01 | End: 2022-01-01

## 2022-01-01 RX ORDER — GUAIFENESIN 600 MG/1
15 TABLET, EXTENDED RELEASE ORAL DAILY
Status: DISCONTINUED | OUTPATIENT
Start: 2022-01-01 | End: 2022-01-01

## 2022-01-01 RX ORDER — FLUORIDE TOOTHPASTE
5-10 TOOTHPASTE DENTAL 4 TIMES DAILY PRN
Status: DISCONTINUED | OUTPATIENT
Start: 2022-01-01 | End: 2022-01-01 | Stop reason: HOSPADM

## 2022-01-01 RX ORDER — ATROPINE SULFATE 10 MG/ML
1 SOLUTION/ DROPS OPHTHALMIC
Status: DISCONTINUED | OUTPATIENT
Start: 2022-01-01 | End: 2022-01-01 | Stop reason: HOSPADM

## 2022-01-01 RX ORDER — CEFEPIME HYDROCHLORIDE 1 G/1
1 INJECTION, POWDER, FOR SOLUTION INTRAMUSCULAR; INTRAVENOUS EVERY 8 HOURS
Status: DISCONTINUED | OUTPATIENT
Start: 2022-01-01 | End: 2022-01-01

## 2022-01-01 RX ORDER — ACETAMINOPHEN 500 MG
1000 TABLET ORAL EVERY 6 HOURS PRN
Status: DISCONTINUED | OUTPATIENT
Start: 2022-01-01 | End: 2022-01-01 | Stop reason: HOSPADM

## 2022-01-01 RX ORDER — ACETAMINOPHEN 325 MG/1
650 TABLET ORAL EVERY 4 HOURS PRN
Status: DISCONTINUED | OUTPATIENT
Start: 2022-01-01 | End: 2022-01-01

## 2022-01-01 RX ORDER — HYDROXYZINE HYDROCHLORIDE 25 MG/1
25 TABLET, FILM COATED ORAL EVERY 6 HOURS PRN
Status: DISCONTINUED | OUTPATIENT
Start: 2022-01-01 | End: 2022-01-01

## 2022-01-01 RX ORDER — ALBUTEROL SULFATE 90 UG/1
6 AEROSOL, METERED RESPIRATORY (INHALATION) EVERY 4 HOURS
Status: DISCONTINUED | OUTPATIENT
Start: 2022-01-01 | End: 2022-01-01

## 2022-01-01 RX ORDER — LORAZEPAM 2 MG/ML
2 INJECTION INTRAMUSCULAR EVERY 4 HOURS PRN
Status: DISCONTINUED | OUTPATIENT
Start: 2022-01-01 | End: 2022-01-01

## 2022-01-01 RX ORDER — NOREPINEPHRINE BITARTRATE 0.06 MG/ML
.01-.6 INJECTION, SOLUTION INTRAVENOUS CONTINUOUS PRN
Status: DISCONTINUED | OUTPATIENT
Start: 2022-01-01 | End: 2022-01-01

## 2022-01-01 RX ORDER — PROPOFOL 10 MG/ML
5-75 INJECTION, EMULSION INTRAVENOUS CONTINUOUS
Status: DISCONTINUED | OUTPATIENT
Start: 2022-01-01 | End: 2022-01-01

## 2022-01-01 RX ORDER — HALOPERIDOL 5 MG/ML
2 INJECTION INTRAMUSCULAR ONCE
Status: DISCONTINUED | OUTPATIENT
Start: 2022-01-01 | End: 2022-01-01

## 2022-01-01 RX ORDER — CEFTRIAXONE 2 G/1
2 INJECTION, POWDER, FOR SOLUTION INTRAMUSCULAR; INTRAVENOUS EVERY 24 HOURS
Status: DISCONTINUED | OUTPATIENT
Start: 2022-01-01 | End: 2022-01-01

## 2022-01-01 RX ORDER — QUETIAPINE FUMARATE 50 MG/1
50 TABLET, FILM COATED ORAL EVERY EVENING
Status: DISCONTINUED | OUTPATIENT
Start: 2022-01-01 | End: 2022-01-01

## 2022-01-01 RX ORDER — LORAZEPAM 2 MG/ML
2 INJECTION INTRAMUSCULAR EVERY 6 HOURS
Status: DISCONTINUED | OUTPATIENT
Start: 2022-01-01 | End: 2022-01-01

## 2022-01-01 RX ORDER — POTASSIUM CHLORIDE 29.8 MG/ML
20 INJECTION INTRAVENOUS
Status: CANCELLED | OUTPATIENT
Start: 2022-01-01

## 2022-01-01 RX ORDER — AMOXICILLIN 250 MG
1 CAPSULE ORAL AT BEDTIME
Status: DISCONTINUED | OUTPATIENT
Start: 2022-01-01 | End: 2022-01-01

## 2022-01-01 RX ORDER — NOREPINEPHRINE BITARTRATE 0.06 MG/ML
.01-.6 INJECTION, SOLUTION INTRAVENOUS CONTINUOUS PRN
Status: CANCELLED | OUTPATIENT
Start: 2022-01-01

## 2022-01-01 RX ORDER — ALBUTEROL SULFATE 90 UG/1
6 AEROSOL, METERED RESPIRATORY (INHALATION) EVERY 4 HOURS
Status: CANCELLED | OUTPATIENT
Start: 2022-01-01

## 2022-01-01 RX ORDER — POTASSIUM CHLORIDE 29.8 MG/ML
20 INJECTION INTRAVENOUS
Status: DISCONTINUED | OUTPATIENT
Start: 2022-01-01 | End: 2022-01-01

## 2022-01-01 RX ORDER — HEPARIN SODIUM 10000 [USP'U]/100ML
0-5000 INJECTION, SOLUTION INTRAVENOUS CONTINUOUS
Status: DISCONTINUED | OUTPATIENT
Start: 2022-01-01 | End: 2022-01-01 | Stop reason: HOSPADM

## 2022-01-01 RX ORDER — EPINEPHRINE IN SOD CHLOR,ISO 1 MG/10 ML
1 SYRINGE (ML) INTRAVENOUS ONCE
Status: COMPLETED | OUTPATIENT
Start: 2022-01-01 | End: 2022-01-01

## 2022-01-01 RX ORDER — BROMOCRIPTINE MESYLATE 2.5 MG/1
5 TABLET ORAL 2 TIMES DAILY
Status: DISCONTINUED | OUTPATIENT
Start: 2022-01-01 | End: 2022-01-01 | Stop reason: HOSPADM

## 2022-01-01 RX ORDER — ONDANSETRON 4 MG/1
4 TABLET, ORALLY DISINTEGRATING ORAL EVERY 6 HOURS PRN
Status: DISCONTINUED | OUTPATIENT
Start: 2022-01-01 | End: 2022-01-01 | Stop reason: HOSPADM

## 2022-01-01 RX ORDER — ACETAMINOPHEN 325 MG/1
650 TABLET ORAL EVERY 4 HOURS
Status: DISCONTINUED | OUTPATIENT
Start: 2022-01-01 | End: 2022-01-01

## 2022-01-01 RX ORDER — NOREPINEPHRINE BITARTRATE 0.06 MG/ML
.01-.6 INJECTION, SOLUTION INTRAVENOUS CONTINUOUS
Status: DISCONTINUED | OUTPATIENT
Start: 2022-01-01 | End: 2022-01-01

## 2022-01-01 RX ORDER — GLYCOPYRROLATE 0.2 MG/ML
0.1 INJECTION, SOLUTION INTRAMUSCULAR; INTRAVENOUS
Status: DISCONTINUED | OUTPATIENT
Start: 2022-01-01 | End: 2022-01-01 | Stop reason: HOSPADM

## 2022-01-01 RX ORDER — HALOPERIDOL 5 MG/ML
2 INJECTION INTRAMUSCULAR EVERY 4 HOURS PRN
Status: DISCONTINUED | OUTPATIENT
Start: 2022-01-01 | End: 2022-01-01

## 2022-01-01 RX ORDER — LIDOCAINE 40 MG/G
CREAM TOPICAL
Status: DISCONTINUED | OUTPATIENT
Start: 2022-01-01 | End: 2022-01-01

## 2022-01-01 RX ORDER — FENTANYL CITRATE 50 UG/ML
25 INJECTION, SOLUTION INTRAMUSCULAR; INTRAVENOUS EVERY 5 MIN PRN
Status: DISCONTINUED | OUTPATIENT
Start: 2022-01-01 | End: 2022-01-01 | Stop reason: HOSPADM

## 2022-01-01 RX ORDER — ROPIVACAINE IN 0.9% SOD CHL/PF 0.1 %
.03-.125 PLASTIC BAG, INJECTION (ML) EPIDURAL CONTINUOUS
Status: DISCONTINUED | OUTPATIENT
Start: 2022-01-01 | End: 2022-01-01 | Stop reason: HOSPADM

## 2022-01-01 RX ORDER — HYDROMORPHONE HYDROCHLORIDE 1 MG/ML
.3-.5 INJECTION, SOLUTION INTRAMUSCULAR; INTRAVENOUS; SUBCUTANEOUS
Status: DISCONTINUED | OUTPATIENT
Start: 2022-01-01 | End: 2022-01-01 | Stop reason: HOSPADM

## 2022-01-01 RX ORDER — HEPARIN SODIUM 1000 [USP'U]/ML
500-6000 INJECTION, SOLUTION INTRAVENOUS; SUBCUTANEOUS
Status: COMPLETED | OUTPATIENT
Start: 2022-01-01 | End: 2022-01-01

## 2022-01-01 RX ORDER — MIDAZOLAM HCL IN 0.9 % NACL/PF 1 MG/ML
1-8 PLASTIC BAG, INJECTION (ML) INTRAVENOUS CONTINUOUS
Status: DISCONTINUED | OUTPATIENT
Start: 2022-01-01 | End: 2022-01-01 | Stop reason: HOSPADM

## 2022-01-01 RX ORDER — ACETAMINOPHEN 325 MG/1
650 TABLET ORAL EVERY 4 HOURS
Status: DISCONTINUED | OUTPATIENT
Start: 2022-01-01 | End: 2022-01-01 | Stop reason: HOSPADM

## 2022-01-01 RX ADMIN — GLYCOPYRROLATE 0.1 MG: 0.2 INJECTION INTRAMUSCULAR; INTRAVENOUS at 01:15

## 2022-01-01 RX ADMIN — SODIUM CHLORIDE 750 MG: 9 INJECTION, SOLUTION INTRAVENOUS at 19:33

## 2022-01-01 RX ADMIN — DEXMEDETOMIDINE HYDROCHLORIDE 1.2 MCG/KG/HR: 400 INJECTION INTRAVENOUS at 12:28

## 2022-01-01 RX ADMIN — Medication: at 02:34

## 2022-01-01 RX ADMIN — HYDROMORPHONE HYDROCHLORIDE 0.5 MG: 1 INJECTION, SOLUTION INTRAMUSCULAR; INTRAVENOUS; SUBCUTANEOUS at 05:13

## 2022-01-01 RX ADMIN — HYDRALAZINE HYDROCHLORIDE 10 MG: 20 INJECTION INTRAMUSCULAR; INTRAVENOUS at 04:40

## 2022-01-01 RX ADMIN — HYDROMORPHONE HYDROCHLORIDE 0.5 MG: 1 INJECTION, SOLUTION INTRAMUSCULAR; INTRAVENOUS; SUBCUTANEOUS at 02:18

## 2022-01-01 RX ADMIN — DEXMEDETOMIDINE HYDROCHLORIDE 1.2 MCG/KG/HR: 400 INJECTION INTRAVENOUS at 21:27

## 2022-01-01 RX ADMIN — LORAZEPAM 2 MG: 2 INJECTION INTRAMUSCULAR; INTRAVENOUS at 14:20

## 2022-01-01 RX ADMIN — ACETAMINOPHEN 650 MG: 325 TABLET, FILM COATED ORAL at 04:41

## 2022-01-01 RX ADMIN — DIPHENHYDRAMINE HYDROCHLORIDE 25 MG: 50 INJECTION INTRAMUSCULAR; INTRAVENOUS at 22:30

## 2022-01-01 RX ADMIN — HYDROMORPHONE HYDROCHLORIDE 0.5 MG: 1 INJECTION, SOLUTION INTRAMUSCULAR; INTRAVENOUS; SUBCUTANEOUS at 08:26

## 2022-01-01 RX ADMIN — QUETIAPINE FUMARATE 25 MG: 25 TABLET ORAL at 16:11

## 2022-01-01 RX ADMIN — LORAZEPAM 2 MG: 2 INJECTION INTRAMUSCULAR; INTRAVENOUS at 22:44

## 2022-01-01 RX ADMIN — LORAZEPAM 2 MG: 2 INJECTION INTRAMUSCULAR; INTRAVENOUS at 18:42

## 2022-01-01 RX ADMIN — POTASSIUM & SODIUM PHOSPHATES POWDER PACK 280-160-250 MG 2 PACKET: 280-160-250 PACK at 08:19

## 2022-01-01 RX ADMIN — LEVETIRACETAM 3600 MG: 100 INJECTION, SOLUTION INTRAVENOUS at 11:41

## 2022-01-01 RX ADMIN — LEVETIRACETAM 2000 MG: 100 INJECTION, SOLUTION INTRAVENOUS at 15:34

## 2022-01-01 RX ADMIN — PROPOFOL 35 MCG/KG/MIN: 10 INJECTION, EMULSION INTRAVENOUS at 16:04

## 2022-01-01 RX ADMIN — HYDROMORPHONE HYDROCHLORIDE 0.5 MG: 1 INJECTION, SOLUTION INTRAMUSCULAR; INTRAVENOUS; SUBCUTANEOUS at 07:54

## 2022-01-01 RX ADMIN — SODIUM CHLORIDE 65 ML: 9 INJECTION, SOLUTION INTRAVENOUS at 10:23

## 2022-01-01 RX ADMIN — BROMOCRIPTINE MESYLATE 5 MG: 2.5 TABLET ORAL at 20:26

## 2022-01-01 RX ADMIN — HYDROMORPHONE HYDROCHLORIDE 0.5 MG: 1 INJECTION, SOLUTION INTRAMUSCULAR; INTRAVENOUS; SUBCUTANEOUS at 19:57

## 2022-01-01 RX ADMIN — LORAZEPAM 2 MG: 2 INJECTION INTRAMUSCULAR; INTRAVENOUS at 15:34

## 2022-01-01 RX ADMIN — LORAZEPAM 2 MG: 2 INJECTION INTRAMUSCULAR; INTRAVENOUS at 23:50

## 2022-01-01 RX ADMIN — HYDROMORPHONE HYDROCHLORIDE 0.5 MG: 1 INJECTION, SOLUTION INTRAMUSCULAR; INTRAVENOUS; SUBCUTANEOUS at 02:34

## 2022-01-01 RX ADMIN — HEPARIN SODIUM 1450 UNITS/HR: 1000 INJECTION INTRAVENOUS; SUBCUTANEOUS at 06:09

## 2022-01-01 RX ADMIN — SODIUM CHLORIDE 500 MG: 9 INJECTION, SOLUTION INTRAVENOUS at 13:56

## 2022-01-01 RX ADMIN — SODIUM CHLORIDE 750 MG: 9 INJECTION, SOLUTION INTRAVENOUS at 19:58

## 2022-01-01 RX ADMIN — ACETAMINOPHEN 650 MG: 325 TABLET, FILM COATED ORAL at 20:25

## 2022-01-01 RX ADMIN — LORAZEPAM 2 MG: 2 INJECTION INTRAMUSCULAR; INTRAVENOUS at 02:07

## 2022-01-01 RX ADMIN — IODIXANOL 150 ML: 320 INJECTION, SOLUTION INTRAVASCULAR at 13:50

## 2022-01-01 RX ADMIN — POLYETHYLENE GLYCOL 3350 17 G: 17 POWDER, FOR SOLUTION ORAL at 08:19

## 2022-01-01 RX ADMIN — CEFEPIME HYDROCHLORIDE 1 G: 1 INJECTION, POWDER, FOR SOLUTION INTRAMUSCULAR; INTRAVENOUS at 10:17

## 2022-01-01 RX ADMIN — HYDROMORPHONE HYDROCHLORIDE 0.5 MG: 1 INJECTION, SOLUTION INTRAMUSCULAR; INTRAVENOUS; SUBCUTANEOUS at 08:34

## 2022-01-01 RX ADMIN — HYDROMORPHONE HYDROCHLORIDE 0.5 MG: 1 INJECTION, SOLUTION INTRAMUSCULAR; INTRAVENOUS; SUBCUTANEOUS at 16:20

## 2022-01-01 RX ADMIN — Medication 10 MG: at 20:26

## 2022-01-01 RX ADMIN — SODIUM CHLORIDE 750 MG: 9 INJECTION, SOLUTION INTRAVENOUS at 04:27

## 2022-01-01 RX ADMIN — DEXMEDETOMIDINE HYDROCHLORIDE 1.2 MCG/KG/HR: 400 INJECTION INTRAVENOUS at 13:35

## 2022-01-01 RX ADMIN — PANTOPRAZOLE SODIUM 40 MG: 40 INJECTION, POWDER, FOR SOLUTION INTRAVENOUS at 09:15

## 2022-01-01 RX ADMIN — LIDOCAINE HYDROCHLORIDE 5 ML: 20 SOLUTION ORAL; TOPICAL at 13:24

## 2022-01-01 RX ADMIN — CALCIUM CHLORIDE 1 G: 100 INJECTION, SOLUTION INTRAVENOUS at 06:08

## 2022-01-01 RX ADMIN — LEVETIRACETAM 2000 MG: 100 INJECTION, SOLUTION INTRAVENOUS at 03:37

## 2022-01-01 RX ADMIN — ATROPINE SULFATE 1 DROP: 10 SOLUTION/ DROPS OPHTHALMIC at 02:17

## 2022-01-01 RX ADMIN — HYDROMORPHONE HYDROCHLORIDE 0.5 MG: 1 INJECTION, SOLUTION INTRAMUSCULAR; INTRAVENOUS; SUBCUTANEOUS at 21:21

## 2022-01-01 RX ADMIN — PANTOPRAZOLE SODIUM 40 MG: 40 INJECTION, POWDER, FOR SOLUTION INTRAVENOUS at 15:16

## 2022-01-01 RX ADMIN — MAGNESIUM SULFATE HEPTAHYDRATE 2 G: 40 INJECTION, SOLUTION INTRAVENOUS at 10:55

## 2022-01-01 RX ADMIN — LORAZEPAM 2 MG: 2 INJECTION INTRAMUSCULAR; INTRAVENOUS at 10:37

## 2022-01-01 RX ADMIN — LORAZEPAM 2 MG: 2 INJECTION INTRAMUSCULAR; INTRAVENOUS at 17:16

## 2022-01-01 RX ADMIN — HEPARIN SODIUM 1000 UNITS/HR: 1000 INJECTION INTRAVENOUS; SUBCUTANEOUS at 21:27

## 2022-01-01 RX ADMIN — SODIUM CHLORIDE 750 MG: 9 INJECTION, SOLUTION INTRAVENOUS at 13:48

## 2022-01-01 RX ADMIN — PROPOFOL 50 MCG/KG/MIN: 10 INJECTION, EMULSION INTRAVENOUS at 15:17

## 2022-01-01 RX ADMIN — HYDROMORPHONE HYDROCHLORIDE 0.5 MG: 1 INJECTION, SOLUTION INTRAMUSCULAR; INTRAVENOUS; SUBCUTANEOUS at 06:09

## 2022-01-01 RX ADMIN — LORAZEPAM 2 MG: 2 INJECTION INTRAMUSCULAR; INTRAVENOUS at 09:59

## 2022-01-01 RX ADMIN — Medication 15 ML: at 08:19

## 2022-01-01 RX ADMIN — LORAZEPAM 2 MG: 2 INJECTION INTRAMUSCULAR; INTRAVENOUS at 11:40

## 2022-01-01 RX ADMIN — LORAZEPAM 2 MG: 2 INJECTION INTRAMUSCULAR; INTRAVENOUS at 02:54

## 2022-01-01 RX ADMIN — HYDROMORPHONE HYDROCHLORIDE 0.5 MG: 1 INJECTION, SOLUTION INTRAMUSCULAR; INTRAVENOUS; SUBCUTANEOUS at 03:28

## 2022-01-01 RX ADMIN — SODIUM CHLORIDE 750 MG: 9 INJECTION, SOLUTION INTRAVENOUS at 12:58

## 2022-01-01 RX ADMIN — ATROPINE SULFATE 1 DROP: 10 SOLUTION/ DROPS OPHTHALMIC at 01:12

## 2022-01-01 RX ADMIN — LEVETIRACETAM 2000 MG: 100 INJECTION, SOLUTION INTRAVENOUS at 02:37

## 2022-01-01 RX ADMIN — HYDROMORPHONE HYDROCHLORIDE 0.5 MG: 1 INJECTION, SOLUTION INTRAMUSCULAR; INTRAVENOUS; SUBCUTANEOUS at 15:14

## 2022-01-01 RX ADMIN — SODIUM PHOSPHATE, MONOBASIC, MONOHYDRATE AND SODIUM PHOSPHATE, DIBASIC, ANHYDROUS 15 MMOL: 276; 142 INJECTION, SOLUTION INTRAVENOUS at 05:51

## 2022-01-01 RX ADMIN — MAGNESIUM SULFATE HEPTAHYDRATE 2 G: 40 INJECTION, SOLUTION INTRAVENOUS at 23:48

## 2022-01-01 RX ADMIN — BROMOCRIPTINE MESYLATE 5 MG: 2.5 TABLET ORAL at 19:42

## 2022-01-01 RX ADMIN — LORAZEPAM 2 MG: 2 INJECTION INTRAMUSCULAR; INTRAVENOUS at 13:53

## 2022-01-01 RX ADMIN — QUETIAPINE FUMARATE 25 MG: 25 TABLET ORAL at 14:00

## 2022-01-01 RX ADMIN — Medication: at 16:20

## 2022-01-01 RX ADMIN — HYDROMORPHONE HYDROCHLORIDE 0.5 MG: 1 INJECTION, SOLUTION INTRAMUSCULAR; INTRAVENOUS; SUBCUTANEOUS at 00:08

## 2022-01-01 RX ADMIN — DIPHENHYDRAMINE HYDROCHLORIDE 25 MG: 50 INJECTION INTRAMUSCULAR; INTRAVENOUS at 06:26

## 2022-01-01 RX ADMIN — VALPROATE SODIUM 1200 MG: 100 INJECTION, SOLUTION INTRAVENOUS at 11:41

## 2022-01-01 RX ADMIN — ACETAMINOPHEN 650 MG: 325 TABLET, FILM COATED ORAL at 08:20

## 2022-01-01 RX ADMIN — PANTOPRAZOLE SODIUM 40 MG: 40 INJECTION, POWDER, FOR SOLUTION INTRAVENOUS at 07:53

## 2022-01-01 RX ADMIN — HYDROMORPHONE HYDROCHLORIDE 0.5 MG: 1 INJECTION, SOLUTION INTRAMUSCULAR; INTRAVENOUS; SUBCUTANEOUS at 14:21

## 2022-01-01 RX ADMIN — LEVETIRACETAM 2000 MG: 100 INJECTION, SOLUTION INTRAVENOUS at 17:54

## 2022-01-01 RX ADMIN — SODIUM CHLORIDE 750 MG: 9 INJECTION, SOLUTION INTRAVENOUS at 20:00

## 2022-01-01 RX ADMIN — HYDROMORPHONE HYDROCHLORIDE 0.5 MG: 1 INJECTION, SOLUTION INTRAMUSCULAR; INTRAVENOUS; SUBCUTANEOUS at 04:00

## 2022-01-01 RX ADMIN — HALOPERIDOL LACTATE 2 MG: 5 INJECTION, SOLUTION INTRAMUSCULAR at 00:24

## 2022-01-01 RX ADMIN — LORAZEPAM 2 MG: 2 INJECTION INTRAMUSCULAR; INTRAVENOUS at 05:51

## 2022-01-01 RX ADMIN — LORAZEPAM 2 MG: 2 INJECTION INTRAMUSCULAR; INTRAVENOUS at 01:45

## 2022-01-01 RX ADMIN — ATROPINE SULFATE 1 DROP: 10 SOLUTION/ DROPS OPHTHALMIC at 01:45

## 2022-01-01 RX ADMIN — ACETAMINOPHEN 650 MG: 325 TABLET, FILM COATED ORAL at 15:36

## 2022-01-01 RX ADMIN — HYDRALAZINE HYDROCHLORIDE 20 MG: 20 INJECTION INTRAMUSCULAR; INTRAVENOUS at 11:00

## 2022-01-01 RX ADMIN — DEXMEDETOMIDINE HYDROCHLORIDE 1.2 MCG/KG/HR: 400 INJECTION INTRAVENOUS at 02:52

## 2022-01-01 RX ADMIN — LEVETIRACETAM 2000 MG: 100 INJECTION, SOLUTION INTRAVENOUS at 16:22

## 2022-01-01 RX ADMIN — DOCUSATE SODIUM 50 MG AND SENNOSIDES 8.6 MG 1 TABLET: 8.6; 5 TABLET, FILM COATED ORAL at 21:52

## 2022-01-01 RX ADMIN — ACETAMINOPHEN 650 MG: 325 TABLET, FILM COATED ORAL at 09:25

## 2022-01-01 RX ADMIN — ATROPINE SULFATE 1 DROP: 10 SOLUTION/ DROPS OPHTHALMIC at 00:03

## 2022-01-01 RX ADMIN — LORAZEPAM 2 MG: 2 INJECTION INTRAMUSCULAR; INTRAVENOUS at 06:32

## 2022-01-01 RX ADMIN — EPINEPHRINE 1 MG: 0.1 INJECTION, SOLUTION ENDOTRACHEAL; INTRACARDIAC; INTRAVENOUS at 09:22

## 2022-01-01 RX ADMIN — FENTANYL CITRATE 100 MCG/HR: 50 INJECTION INTRAVENOUS at 10:17

## 2022-01-01 RX ADMIN — BROMOCRIPTINE MESYLATE 5 MG: 2.5 TABLET ORAL at 07:47

## 2022-01-01 RX ADMIN — ATROPINE SULFATE 1 DROP: 10 SOLUTION/ DROPS OPHTHALMIC at 19:50

## 2022-01-01 RX ADMIN — Medication: at 07:21

## 2022-01-01 RX ADMIN — LORAZEPAM 2 MG: 2 INJECTION INTRAMUSCULAR; INTRAVENOUS at 08:37

## 2022-01-01 RX ADMIN — HEPARIN SODIUM 1100 UNITS/HR: 1000 INJECTION INTRAVENOUS; SUBCUTANEOUS at 15:42

## 2022-01-01 RX ADMIN — OLANZAPINE 5 MG: 5 TABLET, ORALLY DISINTEGRATING ORAL at 10:21

## 2022-01-01 RX ADMIN — PROPOFOL 20 MCG/KG/MIN: 10 INJECTION, EMULSION INTRAVENOUS at 09:43

## 2022-01-01 RX ADMIN — PROPOFOL 35 MCG/KG/MIN: 10 INJECTION, EMULSION INTRAVENOUS at 10:37

## 2022-01-01 RX ADMIN — IOPAMIDOL 64 ML: 755 INJECTION, SOLUTION INTRAVENOUS at 10:22

## 2022-01-01 RX ADMIN — SODIUM CHLORIDE 500 MG: 9 INJECTION, SOLUTION INTRAVENOUS at 04:41

## 2022-01-01 RX ADMIN — HYDROMORPHONE HYDROCHLORIDE 0.5 MG: 1 INJECTION, SOLUTION INTRAMUSCULAR; INTRAVENOUS; SUBCUTANEOUS at 09:26

## 2022-01-01 RX ADMIN — MIDAZOLAM HYDROCHLORIDE 2 MG/HR: 1 INJECTION, SOLUTION INTRAVENOUS at 09:37

## 2022-01-01 RX ADMIN — ACETAMINOPHEN 975 MG: 325 SUPPOSITORY RECTAL at 21:29

## 2022-01-01 RX ADMIN — ACETAMINOPHEN 975 MG: 325 SUPPOSITORY RECTAL at 22:25

## 2022-01-01 RX ADMIN — SODIUM CHLORIDE 750 MG: 9 INJECTION, SOLUTION INTRAVENOUS at 19:42

## 2022-01-01 RX ADMIN — SODIUM CHLORIDE 500 MG: 9 INJECTION, SOLUTION INTRAVENOUS at 05:03

## 2022-01-01 RX ADMIN — SODIUM CHLORIDE, POTASSIUM CHLORIDE, SODIUM LACTATE AND CALCIUM CHLORIDE 500 ML: 600; 310; 30; 20 INJECTION, SOLUTION INTRAVENOUS at 12:00

## 2022-01-01 RX ADMIN — FENTANYL CITRATE 175 MCG/HR: 50 INJECTION INTRAVENOUS at 22:30

## 2022-01-01 RX ADMIN — FENTANYL CITRATE 25 MCG: 50 INJECTION, SOLUTION INTRAMUSCULAR; INTRAVENOUS at 10:57

## 2022-01-01 RX ADMIN — LEVETIRACETAM 2000 MG: 100 INJECTION, SOLUTION INTRAVENOUS at 16:14

## 2022-01-01 RX ADMIN — HYDROMORPHONE HYDROCHLORIDE 0.5 MG: 1 INJECTION, SOLUTION INTRAMUSCULAR; INTRAVENOUS; SUBCUTANEOUS at 11:47

## 2022-01-01 RX ADMIN — LEVETIRACETAM 2000 MG: 100 INJECTION, SOLUTION INTRAVENOUS at 18:24

## 2022-01-01 RX ADMIN — HYDROMORPHONE HYDROCHLORIDE 0.5 MG: 1 INJECTION, SOLUTION INTRAMUSCULAR; INTRAVENOUS; SUBCUTANEOUS at 02:24

## 2022-01-01 RX ADMIN — CALCIUM GLUCONATE 1 G: 94 INJECTION, SOLUTION INTRAVENOUS at 09:30

## 2022-01-01 RX ADMIN — MIDAZOLAM 2 MG: 1 INJECTION INTRAMUSCULAR; INTRAVENOUS at 23:07

## 2022-01-01 RX ADMIN — SODIUM CHLORIDE 750 MG: 9 INJECTION, SOLUTION INTRAVENOUS at 20:51

## 2022-01-01 RX ADMIN — HEPARIN SODIUM 2 BAG: 200 INJECTION, SOLUTION INTRAVENOUS at 13:11

## 2022-01-01 RX ADMIN — HYDROMORPHONE HYDROCHLORIDE 0.5 MG: 1 INJECTION, SOLUTION INTRAMUSCULAR; INTRAVENOUS; SUBCUTANEOUS at 17:00

## 2022-01-01 RX ADMIN — HYDROMORPHONE HYDROCHLORIDE 0.5 MG: 1 INJECTION, SOLUTION INTRAMUSCULAR; INTRAVENOUS; SUBCUTANEOUS at 19:29

## 2022-01-01 RX ADMIN — CEFEPIME HYDROCHLORIDE 1 G: 1 INJECTION, POWDER, FOR SOLUTION INTRAMUSCULAR; INTRAVENOUS at 02:36

## 2022-01-01 RX ADMIN — MIDAZOLAM 2 MG: 1 INJECTION INTRAMUSCULAR; INTRAVENOUS at 05:13

## 2022-01-01 RX ADMIN — PROPOFOL 50 MCG/KG/MIN: 10 INJECTION, EMULSION INTRAVENOUS at 02:33

## 2022-01-01 RX ADMIN — HYDROMORPHONE HYDROCHLORIDE 0.5 MG: 1 INJECTION, SOLUTION INTRAMUSCULAR; INTRAVENOUS; SUBCUTANEOUS at 13:53

## 2022-01-01 RX ADMIN — LORAZEPAM 2 MG: 2 INJECTION INTRAMUSCULAR; INTRAVENOUS at 18:28

## 2022-01-01 RX ADMIN — ATROPINE SULFATE 1 DROP: 10 SOLUTION/ DROPS OPHTHALMIC at 04:27

## 2022-01-01 RX ADMIN — CEFEPIME HYDROCHLORIDE 2 G: 2 INJECTION, POWDER, FOR SOLUTION INTRAVENOUS at 21:28

## 2022-01-01 RX ADMIN — HYDROMORPHONE HYDROCHLORIDE 0.5 MG: 1 INJECTION, SOLUTION INTRAMUSCULAR; INTRAVENOUS; SUBCUTANEOUS at 23:07

## 2022-01-01 RX ADMIN — LIDOCAINE HYDROCHLORIDE 4 ML: 10 INJECTION, SOLUTION EPIDURAL; INFILTRATION; INTRACAUDAL; PERINEURAL at 13:11

## 2022-01-01 RX ADMIN — HYDROMORPHONE HYDROCHLORIDE 0.5 MG: 1 INJECTION, SOLUTION INTRAMUSCULAR; INTRAVENOUS; SUBCUTANEOUS at 11:28

## 2022-01-01 RX ADMIN — GLYCOPYRROLATE 0.1 MG: 0.2 INJECTION INTRAMUSCULAR; INTRAVENOUS at 11:57

## 2022-01-01 RX ADMIN — HYDROMORPHONE HYDROCHLORIDE 0.5 MG: 1 INJECTION, SOLUTION INTRAMUSCULAR; INTRAVENOUS; SUBCUTANEOUS at 22:17

## 2022-01-01 RX ADMIN — SODIUM CHLORIDE, POTASSIUM CHLORIDE, SODIUM LACTATE AND CALCIUM CHLORIDE 1000 ML: 600; 310; 30; 20 INJECTION, SOLUTION INTRAVENOUS at 04:40

## 2022-01-01 RX ADMIN — HEPARIN SODIUM 700 UNITS/HR: 1000 INJECTION INTRAVENOUS; SUBCUTANEOUS at 17:21

## 2022-01-01 RX ADMIN — LORAZEPAM 2 MG: 2 INJECTION INTRAMUSCULAR; INTRAVENOUS at 06:46

## 2022-01-01 RX ADMIN — HYDROMORPHONE HYDROCHLORIDE 0.5 MG: 1 INJECTION, SOLUTION INTRAMUSCULAR; INTRAVENOUS; SUBCUTANEOUS at 04:33

## 2022-01-01 RX ADMIN — ACETAMINOPHEN 650 MG: 325 TABLET, FILM COATED ORAL at 16:11

## 2022-01-01 RX ADMIN — SODIUM CHLORIDE 500 MG: 9 INJECTION, SOLUTION INTRAVENOUS at 20:26

## 2022-01-01 RX ADMIN — SODIUM CHLORIDE 750 MG: 9 INJECTION, SOLUTION INTRAVENOUS at 11:40

## 2022-01-01 RX ADMIN — POTASSIUM & SODIUM PHOSPHATES POWDER PACK 280-160-250 MG 2 PACKET: 280-160-250 PACK at 14:00

## 2022-01-01 RX ADMIN — Medication 10 MG: at 20:25

## 2022-01-01 RX ADMIN — ATROPINE SULFATE 1 DROP: 10 SOLUTION/ DROPS OPHTHALMIC at 09:27

## 2022-01-01 RX ADMIN — MIDAZOLAM 2 MG: 1 INJECTION INTRAMUSCULAR; INTRAVENOUS at 21:14

## 2022-01-01 RX ADMIN — Medication: at 16:00

## 2022-01-01 RX ADMIN — PROPOFOL 50 MCG/KG/MIN: 10 INJECTION, EMULSION INTRAVENOUS at 20:27

## 2022-01-01 RX ADMIN — HYDROMORPHONE HYDROCHLORIDE 0.5 MG: 1 INJECTION, SOLUTION INTRAMUSCULAR; INTRAVENOUS; SUBCUTANEOUS at 05:51

## 2022-01-01 RX ADMIN — HEPARIN SODIUM 1100 UNITS/HR: 1000 INJECTION INTRAVENOUS; SUBCUTANEOUS at 11:24

## 2022-01-01 RX ADMIN — FENTANYL CITRATE 50 MCG/HR: 50 INJECTION INTRAVENOUS at 15:21

## 2022-01-01 RX ADMIN — LORAZEPAM 2 MG: 2 INJECTION INTRAMUSCULAR; INTRAVENOUS at 18:24

## 2022-01-01 RX ADMIN — HYDROMORPHONE HYDROCHLORIDE 0.5 MG: 1 INJECTION, SOLUTION INTRAMUSCULAR; INTRAVENOUS; SUBCUTANEOUS at 12:00

## 2022-01-01 RX ADMIN — SODIUM CHLORIDE 500 ML: 9 INJECTION, SOLUTION INTRAVENOUS at 10:27

## 2022-01-01 RX ADMIN — ATROPINE SULFATE 1 DROP: 10 SOLUTION/ DROPS OPHTHALMIC at 23:12

## 2022-01-01 RX ADMIN — DEXMEDETOMIDINE HYDROCHLORIDE 0.6 MCG/KG/HR: 400 INJECTION INTRAVENOUS at 01:01

## 2022-01-01 RX ADMIN — CEFTRIAXONE SODIUM 2 G: 2 INJECTION, POWDER, FOR SOLUTION INTRAMUSCULAR; INTRAVENOUS at 10:36

## 2022-01-01 RX ADMIN — ACETAMINOPHEN 650 MG: 325 TABLET, FILM COATED ORAL at 00:25

## 2022-01-01 RX ADMIN — GLYCOPYRROLATE 0.1 MG: 0.2 INJECTION INTRAMUSCULAR; INTRAVENOUS at 08:33

## 2022-01-01 RX ADMIN — HYDROMORPHONE HYDROCHLORIDE 0.5 MG: 1 INJECTION, SOLUTION INTRAMUSCULAR; INTRAVENOUS; SUBCUTANEOUS at 16:52

## 2022-01-01 RX ADMIN — Medication: at 15:41

## 2022-01-01 RX ADMIN — DEXMEDETOMIDINE HYDROCHLORIDE 1.2 MCG/KG/HR: 400 INJECTION INTRAVENOUS at 17:30

## 2022-01-01 RX ADMIN — HYDROMORPHONE HYDROCHLORIDE 0.5 MG: 1 INJECTION, SOLUTION INTRAMUSCULAR; INTRAVENOUS; SUBCUTANEOUS at 19:41

## 2022-01-01 RX ADMIN — ACETAMINOPHEN 650 MG: 325 TABLET, FILM COATED ORAL at 11:23

## 2022-01-01 RX ADMIN — PANTOPRAZOLE SODIUM 40 MG: 40 INJECTION, POWDER, FOR SOLUTION INTRAVENOUS at 08:28

## 2022-01-01 RX ADMIN — LORAZEPAM 4 MG: 2 INJECTION INTRAMUSCULAR; INTRAVENOUS at 14:22

## 2022-01-01 RX ADMIN — LORAZEPAM 2 MG: 2 INJECTION INTRAMUSCULAR; INTRAVENOUS at 04:00

## 2022-01-01 RX ADMIN — SODIUM CHLORIDE 500 ML: 9 INJECTION, SOLUTION INTRAVENOUS at 06:42

## 2022-01-01 RX ADMIN — HYDROMORPHONE HYDROCHLORIDE 0.5 MG: 1 INJECTION, SOLUTION INTRAMUSCULAR; INTRAVENOUS; SUBCUTANEOUS at 01:46

## 2022-01-01 RX ADMIN — HYDROMORPHONE HYDROCHLORIDE 0.5 MG: 1 INJECTION, SOLUTION INTRAMUSCULAR; INTRAVENOUS; SUBCUTANEOUS at 13:46

## 2022-01-01 RX ADMIN — Medication 15 ML: at 16:11

## 2022-01-01 RX ADMIN — BROMOCRIPTINE MESYLATE 5 MG: 2.5 TABLET ORAL at 08:22

## 2022-01-01 RX ADMIN — ACETAMINOPHEN 650 MG: 325 TABLET, FILM COATED ORAL at 16:03

## 2022-01-01 RX ADMIN — HYDROMORPHONE HYDROCHLORIDE 0.5 MG: 1 INJECTION, SOLUTION INTRAMUSCULAR; INTRAVENOUS; SUBCUTANEOUS at 03:25

## 2022-01-01 RX ADMIN — SODIUM CHLORIDE 750 MG: 9 INJECTION, SOLUTION INTRAVENOUS at 04:20

## 2022-01-01 RX ADMIN — Medication: at 23:30

## 2022-01-01 RX ADMIN — SODIUM CHLORIDE 750 MG: 9 INJECTION, SOLUTION INTRAVENOUS at 19:41

## 2022-01-01 RX ADMIN — ACETAMINOPHEN 650 MG: 325 TABLET, FILM COATED ORAL at 12:06

## 2022-01-01 RX ADMIN — HYDROMORPHONE HYDROCHLORIDE 0.5 MG: 1 INJECTION, SOLUTION INTRAMUSCULAR; INTRAVENOUS; SUBCUTANEOUS at 17:37

## 2022-01-01 RX ADMIN — POTASSIUM CHLORIDE 20 MEQ: 1.5 POWDER, FOR SOLUTION ORAL at 06:06

## 2022-01-01 RX ADMIN — HALOPERIDOL LACTATE 2 MG: 5 INJECTION, SOLUTION INTRAMUSCULAR at 04:10

## 2022-01-01 RX ADMIN — SODIUM CHLORIDE, POTASSIUM CHLORIDE, SODIUM LACTATE AND CALCIUM CHLORIDE 500 ML: 600; 310; 30; 20 INJECTION, SOLUTION INTRAVENOUS at 17:17

## 2022-01-01 RX ADMIN — GLYCOPYRROLATE 0.1 MG: 0.2 INJECTION INTRAMUSCULAR; INTRAVENOUS at 14:32

## 2022-01-01 RX ADMIN — HYDROMORPHONE HYDROCHLORIDE 0.5 MG: 1 INJECTION, SOLUTION INTRAMUSCULAR; INTRAVENOUS; SUBCUTANEOUS at 05:46

## 2022-01-01 RX ADMIN — ATROPINE SULFATE 1 DROP: 10 SOLUTION/ DROPS OPHTHALMIC at 19:43

## 2022-01-01 RX ADMIN — CEFEPIME HYDROCHLORIDE 2 G: 2 INJECTION, POWDER, FOR SOLUTION INTRAVENOUS at 11:56

## 2022-01-01 RX ADMIN — ATROPINE SULFATE 1 DROP: 10 SOLUTION/ DROPS OPHTHALMIC at 00:08

## 2022-01-01 RX ADMIN — ATROPINE SULFATE 1 DROP: 10 SOLUTION/ DROPS OPHTHALMIC at 15:06

## 2022-01-01 RX ADMIN — HYDROMORPHONE HYDROCHLORIDE 0.5 MG: 1 INJECTION, SOLUTION INTRAMUSCULAR; INTRAVENOUS; SUBCUTANEOUS at 20:51

## 2022-01-01 RX ADMIN — ACETAMINOPHEN 650 MG: 325 TABLET, FILM COATED ORAL at 20:26

## 2022-01-01 RX ADMIN — HYDROMORPHONE HYDROCHLORIDE 0.5 MG: 1 INJECTION, SOLUTION INTRAMUSCULAR; INTRAVENOUS; SUBCUTANEOUS at 15:03

## 2022-01-01 RX ADMIN — OXYCODONE HYDROCHLORIDE 5 MG: 5 TABLET ORAL at 13:53

## 2022-01-01 RX ADMIN — Medication 50 MCG: at 15:40

## 2022-01-01 RX ADMIN — ATROPINE SULFATE 1 DROP: 10 SOLUTION/ DROPS OPHTHALMIC at 23:01

## 2022-01-01 RX ADMIN — DEXMEDETOMIDINE HYDROCHLORIDE 1.2 MCG/KG/HR: 400 INJECTION INTRAVENOUS at 18:52

## 2022-01-01 RX ADMIN — SODIUM CHLORIDE 500 MG: 9 INJECTION, SOLUTION INTRAVENOUS at 20:53

## 2022-01-01 RX ADMIN — Medication 20 MG: at 09:42

## 2022-01-01 RX ADMIN — HYDROMORPHONE HYDROCHLORIDE 0.5 MG: 1 INJECTION, SOLUTION INTRAMUSCULAR; INTRAVENOUS; SUBCUTANEOUS at 05:00

## 2022-01-01 RX ADMIN — LORAZEPAM 2 MG: 2 INJECTION INTRAMUSCULAR; INTRAVENOUS at 22:16

## 2022-01-01 RX ADMIN — POTASSIUM CHLORIDE 20 MEQ: 1.5 POWDER, FOR SOLUTION ORAL at 20:32

## 2022-01-01 RX ADMIN — Medication 15 ML: at 08:22

## 2022-01-01 RX ADMIN — HYDROMORPHONE HYDROCHLORIDE 0.5 MG: 1 INJECTION, SOLUTION INTRAMUSCULAR; INTRAVENOUS; SUBCUTANEOUS at 21:59

## 2022-01-01 RX ADMIN — PANTOPRAZOLE SODIUM 40 MG: 40 INJECTION, POWDER, FOR SOLUTION INTRAVENOUS at 11:48

## 2022-01-01 RX ADMIN — LEVETIRACETAM 2000 MG: 100 INJECTION, SOLUTION INTRAVENOUS at 06:10

## 2022-01-01 RX ADMIN — HALOPERIDOL LACTATE 2 MG: 5 INJECTION, SOLUTION INTRAMUSCULAR at 10:21

## 2022-01-01 RX ADMIN — QUETIAPINE FUMARATE 25 MG: 25 TABLET ORAL at 08:19

## 2022-01-01 RX ADMIN — GLYCOPYRROLATE 0.1 MG: 0.2 INJECTION INTRAMUSCULAR; INTRAVENOUS at 09:25

## 2022-01-01 RX ADMIN — GLYCOPYRROLATE 0.1 MG: 0.2 INJECTION INTRAMUSCULAR; INTRAVENOUS at 22:53

## 2022-01-01 RX ADMIN — MAGNESIUM SULFATE HEPTAHYDRATE 2 G: 40 INJECTION, SOLUTION INTRAVENOUS at 09:24

## 2022-01-01 RX ADMIN — CEFTRIAXONE SODIUM 2 G: 2 INJECTION, POWDER, FOR SOLUTION INTRAMUSCULAR; INTRAVENOUS at 11:06

## 2022-01-01 RX ADMIN — LORAZEPAM 2 MG: 2 INJECTION INTRAMUSCULAR; INTRAVENOUS at 15:43

## 2022-01-01 RX ADMIN — HYDROMORPHONE HYDROCHLORIDE 0.5 MG: 1 INJECTION, SOLUTION INTRAMUSCULAR; INTRAVENOUS; SUBCUTANEOUS at 09:05

## 2022-01-01 RX ADMIN — HYDROMORPHONE HYDROCHLORIDE 0.5 MG: 1 INJECTION, SOLUTION INTRAMUSCULAR; INTRAVENOUS; SUBCUTANEOUS at 00:40

## 2022-01-01 RX ADMIN — PROPOFOL 20 MCG/KG/MIN: 10 INJECTION, EMULSION INTRAVENOUS at 11:28

## 2022-01-01 RX ADMIN — PROPOFOL 50 MCG/KG/MIN: 10 INJECTION, EMULSION INTRAVENOUS at 06:16

## 2022-01-01 RX ADMIN — Medication 8 MG/HR: at 19:38

## 2022-01-01 RX ADMIN — CEFEPIME HYDROCHLORIDE 1 G: 1 INJECTION, POWDER, FOR SOLUTION INTRAMUSCULAR; INTRAVENOUS at 17:40

## 2022-01-01 RX ADMIN — MIDAZOLAM HYDROCHLORIDE 1 MG/HR: 1 INJECTION, SOLUTION INTRAVENOUS at 11:05

## 2022-01-01 RX ADMIN — HEPARIN SODIUM 2000 UNITS: 1000 INJECTION INTRAVENOUS; SUBCUTANEOUS at 13:05

## 2022-01-01 RX ADMIN — ACETAMINOPHEN 650 MG: 325 TABLET, FILM COATED ORAL at 04:10

## 2022-01-01 RX ADMIN — BROMOCRIPTINE MESYLATE 5 MG: 2.5 TABLET ORAL at 17:41

## 2022-01-01 RX ADMIN — HYDROMORPHONE HYDROCHLORIDE 0.5 MG: 1 INJECTION, SOLUTION INTRAMUSCULAR; INTRAVENOUS; SUBCUTANEOUS at 18:24

## 2022-01-01 RX ADMIN — SODIUM CHLORIDE 750 MG: 9 INJECTION, SOLUTION INTRAVENOUS at 12:31

## 2022-01-01 RX ADMIN — MAGNESIUM SULFATE HEPTAHYDRATE 2 G: 40 INJECTION, SOLUTION INTRAVENOUS at 04:48

## 2022-01-01 RX ADMIN — LORAZEPAM 1 MG: 2 INJECTION INTRAMUSCULAR; INTRAVENOUS at 10:04

## 2022-01-01 RX ADMIN — LORAZEPAM 2 MG: 2 INJECTION INTRAMUSCULAR; INTRAVENOUS at 11:32

## 2022-01-01 RX ADMIN — ACETAMINOPHEN 650 MG: 325 TABLET, FILM COATED ORAL at 04:11

## 2022-01-01 RX ADMIN — ATROPINE SULFATE 1 DROP: 10 SOLUTION/ DROPS OPHTHALMIC at 22:46

## 2022-01-01 RX ADMIN — HYDROMORPHONE HYDROCHLORIDE 0.5 MG: 1 INJECTION, SOLUTION INTRAMUSCULAR; INTRAVENOUS; SUBCUTANEOUS at 14:22

## 2022-01-01 RX ADMIN — HYDROMORPHONE HYDROCHLORIDE 0.5 MG: 1 INJECTION, SOLUTION INTRAMUSCULAR; INTRAVENOUS; SUBCUTANEOUS at 21:13

## 2022-01-01 RX ADMIN — MIDAZOLAM 2 MG: 1 INJECTION INTRAMUSCULAR; INTRAVENOUS at 09:50

## 2022-01-01 RX ADMIN — ATROPINE SULFATE 1 DROP: 10 SOLUTION/ DROPS OPHTHALMIC at 23:52

## 2022-01-01 RX ADMIN — ATROPINE SULFATE 1 DROP: 10 SOLUTION/ DROPS OPHTHALMIC at 19:30

## 2022-01-01 RX ADMIN — HYDROMORPHONE HYDROCHLORIDE 0.5 MG: 1 INJECTION, SOLUTION INTRAMUSCULAR; INTRAVENOUS; SUBCUTANEOUS at 23:50

## 2022-01-01 RX ADMIN — HYDROMORPHONE HYDROCHLORIDE 0.5 MG: 1 INJECTION, SOLUTION INTRAMUSCULAR; INTRAVENOUS; SUBCUTANEOUS at 10:59

## 2022-01-01 RX ADMIN — HYDROMORPHONE HYDROCHLORIDE 0.5 MG: 1 INJECTION, SOLUTION INTRAMUSCULAR; INTRAVENOUS; SUBCUTANEOUS at 00:16

## 2022-01-01 RX ADMIN — MIDAZOLAM HYDROCHLORIDE 2 MG/HR: 1 INJECTION, SOLUTION INTRAVENOUS at 14:31

## 2022-01-01 RX ADMIN — SODIUM CHLORIDE 750 MG: 9 INJECTION, SOLUTION INTRAVENOUS at 04:47

## 2022-01-01 RX ADMIN — Medication 8 MG/HR: at 23:29

## 2022-01-01 RX ADMIN — HYDROMORPHONE HYDROCHLORIDE 0.5 MG: 1 INJECTION, SOLUTION INTRAMUSCULAR; INTRAVENOUS; SUBCUTANEOUS at 09:56

## 2022-01-01 RX ADMIN — SODIUM CHLORIDE 1800 MG: 9 INJECTION, SOLUTION INTRAVENOUS at 13:24

## 2022-01-01 RX ADMIN — ATROPINE SULFATE 1 DROP: 10 SOLUTION/ DROPS OPHTHALMIC at 21:50

## 2022-01-01 RX ADMIN — PANTOPRAZOLE SODIUM 40 MG: 40 INJECTION, POWDER, FOR SOLUTION INTRAVENOUS at 08:38

## 2022-01-01 RX ADMIN — HYDROMORPHONE HYDROCHLORIDE 0.5 MG: 1 INJECTION, SOLUTION INTRAMUSCULAR; INTRAVENOUS; SUBCUTANEOUS at 02:16

## 2022-01-01 RX ADMIN — Medication 0.03 MCG/KG/MIN: at 09:44

## 2022-01-01 RX ADMIN — HEPARIN SODIUM 1300 UNITS/HR: 1000 INJECTION INTRAVENOUS; SUBCUTANEOUS at 22:35

## 2022-01-01 RX ADMIN — LORAZEPAM 2 MG: 2 INJECTION INTRAMUSCULAR; INTRAVENOUS at 19:29

## 2022-01-01 RX ADMIN — ACETAMINOPHEN 650 MG: 325 TABLET, FILM COATED ORAL at 00:20

## 2022-01-01 RX ADMIN — SODIUM CHLORIDE 750 MG: 9 INJECTION, SOLUTION INTRAVENOUS at 11:57

## 2022-01-01 RX ADMIN — DEXTROSE 3 MCG/KG/MIN: 50 INJECTION, SOLUTION INTRAVENOUS at 07:17

## 2022-01-01 RX ADMIN — HYDROMORPHONE HYDROCHLORIDE 0.5 MG: 1 INJECTION, SOLUTION INTRAMUSCULAR; INTRAVENOUS; SUBCUTANEOUS at 21:48

## 2022-01-01 RX ADMIN — HYDROMORPHONE HYDROCHLORIDE 0.5 MG: 1 INJECTION, SOLUTION INTRAMUSCULAR; INTRAVENOUS; SUBCUTANEOUS at 12:30

## 2022-01-01 RX ADMIN — HYDROMORPHONE HYDROCHLORIDE 0.5 MG: 1 INJECTION, SOLUTION INTRAMUSCULAR; INTRAVENOUS; SUBCUTANEOUS at 01:05

## 2022-01-01 RX ADMIN — LORAZEPAM 2 MG: 2 INJECTION INTRAMUSCULAR; INTRAVENOUS at 19:41

## 2022-01-01 RX ADMIN — HEPARIN SODIUM 1300 UNITS/HR: 1000 INJECTION INTRAVENOUS; SUBCUTANEOUS at 16:50

## 2022-01-01 RX ADMIN — Medication: at 09:00

## 2022-01-01 RX ADMIN — LEVETIRACETAM 2000 MG: 100 INJECTION, SOLUTION INTRAVENOUS at 06:27

## 2022-01-01 RX ADMIN — ATROPINE SULFATE 1 DROP: 10 SOLUTION/ DROPS OPHTHALMIC at 15:35

## 2022-01-01 RX ADMIN — HYDROMORPHONE HYDROCHLORIDE 0.5 MG: 1 INJECTION, SOLUTION INTRAMUSCULAR; INTRAVENOUS; SUBCUTANEOUS at 09:25

## 2022-01-01 RX ADMIN — SODIUM PHOSPHATE, MONOBASIC, MONOHYDRATE AND SODIUM PHOSPHATE, DIBASIC, ANHYDROUS 10 MMOL: 276; 142 INJECTION, SOLUTION INTRAVENOUS at 05:59

## 2022-01-01 RX ADMIN — QUETIAPINE FUMARATE 50 MG: 50 TABLET ORAL at 20:25

## 2022-01-01 RX ADMIN — SODIUM CHLORIDE, POTASSIUM CHLORIDE, SODIUM LACTATE AND CALCIUM CHLORIDE 1000 ML: 600; 310; 30; 20 INJECTION, SOLUTION INTRAVENOUS at 02:35

## 2022-01-01 RX ADMIN — SODIUM CHLORIDE 1000 ML: 9 INJECTION, SOLUTION INTRAVENOUS at 09:30

## 2022-01-01 RX ADMIN — HYDRALAZINE HYDROCHLORIDE 10 MG: 20 INJECTION INTRAMUSCULAR; INTRAVENOUS at 08:27

## 2022-01-01 RX ADMIN — POTASSIUM & SODIUM PHOSPHATES POWDER PACK 280-160-250 MG 2 PACKET: 280-160-250 PACK at 20:25

## 2022-01-01 RX ADMIN — SODIUM CHLORIDE 750 MG: 9 INJECTION, SOLUTION INTRAVENOUS at 04:11

## 2022-01-01 RX ADMIN — HYDROMORPHONE HYDROCHLORIDE 0.5 MG: 1 INJECTION, SOLUTION INTRAMUSCULAR; INTRAVENOUS; SUBCUTANEOUS at 06:55

## 2022-01-01 RX ADMIN — ACETAMINOPHEN 650 MG: 325 TABLET, FILM COATED ORAL at 19:41

## 2022-01-01 RX ADMIN — HYDROMORPHONE HYDROCHLORIDE 0.5 MG: 1 INJECTION, SOLUTION INTRAMUSCULAR; INTRAVENOUS; SUBCUTANEOUS at 07:47

## 2022-01-01 RX ADMIN — LORAZEPAM 4 MG: 2 INJECTION INTRAMUSCULAR; INTRAVENOUS at 11:11

## 2022-01-01 RX ADMIN — MAGNESIUM SULFATE HEPTAHYDRATE 2 G: 40 INJECTION, SOLUTION INTRAVENOUS at 16:44

## 2022-01-01 RX ADMIN — QUETIAPINE FUMARATE 50 MG: 50 TABLET ORAL at 20:26

## 2022-01-01 RX ADMIN — HYDROMORPHONE HYDROCHLORIDE 0.5 MG: 1 INJECTION, SOLUTION INTRAMUSCULAR; INTRAVENOUS; SUBCUTANEOUS at 01:11

## 2022-01-01 RX ADMIN — NALOXONE HYDROCHLORIDE 2 MG: 1 INJECTION PARENTERAL at 09:24

## 2022-01-01 RX ADMIN — ACETAMINOPHEN 975 MG: 325 SUPPOSITORY RECTAL at 22:43

## 2022-01-01 RX ADMIN — ACETAMINOPHEN 650 MG: 325 TABLET, FILM COATED ORAL at 01:20

## 2022-01-01 RX ADMIN — CEFTRIAXONE SODIUM 2 G: 2 INJECTION, POWDER, FOR SOLUTION INTRAMUSCULAR; INTRAVENOUS at 09:49

## 2022-01-01 RX ADMIN — LEVETIRACETAM 2000 MG: 100 INJECTION, SOLUTION INTRAVENOUS at 06:50

## 2022-01-01 RX ADMIN — ACETAMINOPHEN 650 MG: 325 TABLET, FILM COATED ORAL at 16:05

## 2022-01-01 RX ADMIN — LORAZEPAM 2 MG: 2 INJECTION INTRAMUSCULAR; INTRAVENOUS at 14:34

## 2022-01-01 RX ADMIN — LORAZEPAM 2 MG: 2 INJECTION INTRAMUSCULAR; INTRAVENOUS at 08:26

## 2022-01-01 RX ADMIN — LORAZEPAM 2 MG: 2 INJECTION INTRAMUSCULAR; INTRAVENOUS at 21:48

## 2022-01-01 RX ADMIN — DEXMEDETOMIDINE HYDROCHLORIDE 1.2 MCG/KG/HR: 400 INJECTION INTRAVENOUS at 08:30

## 2022-01-01 RX ADMIN — HYDROMORPHONE HYDROCHLORIDE 0.5 MG: 1 INJECTION, SOLUTION INTRAMUSCULAR; INTRAVENOUS; SUBCUTANEOUS at 04:27

## 2022-01-01 RX ADMIN — HYDROMORPHONE HYDROCHLORIDE 0.5 MG: 1 INJECTION, SOLUTION INTRAMUSCULAR; INTRAVENOUS; SUBCUTANEOUS at 08:03

## 2022-01-01 RX ADMIN — VANCOMYCIN HYDROCHLORIDE 1250 MG: 10 INJECTION, POWDER, LYOPHILIZED, FOR SOLUTION INTRAVENOUS at 16:10

## 2022-01-01 RX ADMIN — ACETAMINOPHEN 650 MG: 325 TABLET, FILM COATED ORAL at 08:22

## 2022-01-01 RX ADMIN — ATROPINE SULFATE 1 DROP: 10 SOLUTION/ DROPS OPHTHALMIC at 04:05

## 2022-01-01 RX ADMIN — LEVETIRACETAM 2000 MG: 100 INJECTION, SOLUTION INTRAVENOUS at 18:26

## 2022-01-01 ASSESSMENT — ACTIVITIES OF DAILY LIVING (ADL)
ADLS_ACUITY_SCORE: 11
ADLS_ACUITY_SCORE: 13
ADLS_ACUITY_SCORE: 14
ADLS_ACUITY_SCORE: 5
ADLS_ACUITY_SCORE: 13
ADLS_ACUITY_SCORE: 13
ADLS_ACUITY_SCORE: 9
ADLS_ACUITY_SCORE: 14
ADLS_ACUITY_SCORE: 9
ADLS_ACUITY_SCORE: 13
DIFFICULTY_EATING/SWALLOWING: NO
ADLS_ACUITY_SCORE: 13
ADLS_ACUITY_SCORE: 11
ADLS_ACUITY_SCORE: 13
ADLS_ACUITY_SCORE: 9
ADLS_ACUITY_SCORE: 13
ADLS_ACUITY_SCORE: 12
ADLS_ACUITY_SCORE: 9
ADLS_ACUITY_SCORE: 13
ADLS_ACUITY_SCORE: 9
WALKING_OR_CLIMBING_STAIRS_DIFFICULTY: NO
ADLS_ACUITY_SCORE: 13
ADLS_ACUITY_SCORE: 13
CONCENTRATING,_REMEMBERING_OR_MAKING_DECISIONS_DIFFICULTY: NO
ADLS_ACUITY_SCORE: 13
ADLS_ACUITY_SCORE: 12
ADLS_ACUITY_SCORE: 13
DIFFICULTY_COMMUNICATING: NO
ADLS_ACUITY_SCORE: 13
ADLS_ACUITY_SCORE: 14
ADLS_ACUITY_SCORE: 13
ADLS_ACUITY_SCORE: 13
ADLS_ACUITY_SCORE: 9
ADLS_ACUITY_SCORE: 13
ADLS_ACUITY_SCORE: 9
ADLS_ACUITY_SCORE: 9
ADLS_ACUITY_SCORE: 13
ADLS_ACUITY_SCORE: 5
ADLS_ACUITY_SCORE: 13
ADLS_ACUITY_SCORE: 15
ADLS_ACUITY_SCORE: 13
WEAR_GLASSES_OR_BLIND: NO
ADLS_ACUITY_SCORE: 13
ADLS_ACUITY_SCORE: 13
ADLS_ACUITY_SCORE: 5
ADLS_ACUITY_SCORE: 5
ADLS_ACUITY_SCORE: 14
ADLS_ACUITY_SCORE: 13
ADLS_ACUITY_SCORE: 15
ADLS_ACUITY_SCORE: 13
ADLS_ACUITY_SCORE: 12
ADLS_ACUITY_SCORE: 13
ADLS_ACUITY_SCORE: 14
ADLS_ACUITY_SCORE: 13
ADLS_ACUITY_SCORE: 5
FALL_HISTORY_WITHIN_LAST_SIX_MONTHS: NO
ADLS_ACUITY_SCORE: 13
ADLS_ACUITY_SCORE: 13
ADLS_ACUITY_SCORE: 9
ADLS_ACUITY_SCORE: 13
ADLS_ACUITY_SCORE: 15
ADLS_ACUITY_SCORE: 13
ADLS_ACUITY_SCORE: 5
ADLS_ACUITY_SCORE: 14
ADLS_ACUITY_SCORE: 13
ADLS_ACUITY_SCORE: 12
ADLS_ACUITY_SCORE: 13
ADLS_ACUITY_SCORE: 9
ADLS_ACUITY_SCORE: 13
ADLS_ACUITY_SCORE: 14
ADLS_ACUITY_SCORE: 13
DOING_ERRANDS_INDEPENDENTLY_DIFFICULTY: NO
TOILETING_ISSUES: NO
ADLS_ACUITY_SCORE: 13
ADLS_ACUITY_SCORE: 14
ADLS_ACUITY_SCORE: 13
ADLS_ACUITY_SCORE: 9
ADLS_ACUITY_SCORE: 13
ADLS_ACUITY_SCORE: 13
ADLS_ACUITY_SCORE: 11
ADLS_ACUITY_SCORE: 13
HEARING_DIFFICULTY_OR_DEAF: NO
ADLS_ACUITY_SCORE: 13
ADLS_ACUITY_SCORE: 9
ADLS_ACUITY_SCORE: 13
ADLS_ACUITY_SCORE: 14
ADLS_ACUITY_SCORE: 5
ADLS_ACUITY_SCORE: 13
ADLS_ACUITY_SCORE: 15
ADLS_ACUITY_SCORE: 13
ADLS_ACUITY_SCORE: 13
ADLS_ACUITY_SCORE: 11
ADLS_ACUITY_SCORE: 14
ADLS_ACUITY_SCORE: 13
ADLS_ACUITY_SCORE: 14
ADLS_ACUITY_SCORE: 13
ADLS_ACUITY_SCORE: 13
ADLS_ACUITY_SCORE: 11
ADLS_ACUITY_SCORE: 13
ADLS_ACUITY_SCORE: 5
ADLS_ACUITY_SCORE: 13
ADLS_ACUITY_SCORE: 9
ADLS_ACUITY_SCORE: 13
PATIENT_/_FAMILY_COMMUNICATION_STYLE: SPOKEN LANGUAGE (ENGLISH OR BILINGUAL)
ADLS_ACUITY_SCORE: 13
ADLS_ACUITY_SCORE: 5
ADLS_ACUITY_SCORE: 13
ADLS_ACUITY_SCORE: 14
ADLS_ACUITY_SCORE: 13
ADLS_ACUITY_SCORE: 6
ADLS_ACUITY_SCORE: 9
ADLS_ACUITY_SCORE: 13
ADLS_ACUITY_SCORE: 13
ADLS_ACUITY_SCORE: 11
ADLS_ACUITY_SCORE: 11
DRESSING/BATHING_DIFFICULTY: NO
DEPENDENT_IADLS:: INDEPENDENT
ADLS_ACUITY_SCORE: 13
ADLS_ACUITY_SCORE: 13
ADLS_ACUITY_SCORE: 15
ADLS_ACUITY_SCORE: 13
ADLS_ACUITY_SCORE: 6
ADLS_ACUITY_SCORE: 13
ADLS_ACUITY_SCORE: 9
ADLS_ACUITY_SCORE: 13
ADLS_ACUITY_SCORE: 13
ADLS_ACUITY_SCORE: 9
ADLS_ACUITY_SCORE: 9
ADLS_ACUITY_SCORE: 5
ADLS_ACUITY_SCORE: 13
ADLS_ACUITY_SCORE: 9
ADLS_ACUITY_SCORE: 13
ADLS_ACUITY_SCORE: 5
ADLS_ACUITY_SCORE: 13
ADLS_ACUITY_SCORE: 14
ADLS_ACUITY_SCORE: 13

## 2022-01-01 ASSESSMENT — MIFFLIN-ST. JEOR
SCORE: 1198.75
SCORE: 1196.75
SCORE: 1189.75
SCORE: 1191.75

## 2022-01-15 PROBLEM — I26.99 PE (PULMONARY THROMBOEMBOLISM) (H): Status: ACTIVE | Noted: 2022-01-01

## 2022-01-15 NOTE — ED TRIAGE NOTES
Patient being transferred from [facility] after cardiac arrest   unknown downtime at home.  Patient had recent discharge from hernia repair.. Patient arrived at [facility] arrested at 0847 ROSC 0859 and did go back into cardiac arrest x 3.  Patient found to have a saddle PE. And is transferring to .  IR aware of patient being transferrred and is ready.

## 2022-01-15 NOTE — PROGRESS NOTES
Patient arrived via EMS with OPA in place. Patient was manually ventilated with BVM. Patient intubated by ER MD with 7.5 EETT secured 22 cm at the teeth. Placement confirmed with bilateral breath sounds, ETCO2 and a chest xray was obtained. Patient placed on a mechanical ventilator with the following settings:    Ventilation Mode: CMV/AC  (Continuous Mandatory Ventilation/ Assist Control)  Rate Set (breaths/minute): 14 breaths/min  Tidal Volume Set (mL): 350 mL  PEEP (cm H2O): 5 cmH2O  Oxygen Concentration (%): 100 %    Patient transported to CT without incident.    Maximilian Novoa RT on 1/15/2022 at 11:15 AM

## 2022-01-15 NOTE — PROCEDURES
"[facility]    Procedure: Pulmonary angiogram with thrombectomy    Date/Time: 1/15/2022 2:00 PM  Performed by: Donavan Pedraza MD  Authorized by: Donavan Pedraza MD       UNIVERSAL PROTOCOL   Site Marked: NA  Prior Images Obtained and Reviewed:  Yes  Required items: Required blood products, implants, devices and special equipment available    Patient identity confirmed:  Verbally with patient, arm band, provided demographic data and hospital-assigned identification number  Patient was reevaluated immediately before administering moderate or deep sedation or anesthesia  Confirmation Checklist:  Patient's identity using two indicators, relevant allergies, procedure was appropriate and matched the consent or emergent situation and correct equipment/implants were available  Time out: Immediately prior to the procedure a time out was called    Universal Protocol: the Joint Commission Universal Protocol was followed    Preparation: Patient was prepped and draped in usual sterile fashion       ANESTHESIA    Anesthesia: Local infiltration  Local Anesthetic:  Lidocaine 1% without epinephrine      SEDATION  Patient Sedated: Yes    Vital signs: Vital signs monitored during sedation    See dictated procedure note for full details.  Findings: Pre Thrombectomy Pressures  MPA=  50/26   Mean 35     Post thrombectomy  MPA=  35/15   Mean 26    \"woggle\" purse string stitch to right groin.    Specimens: none    Complications: None    Condition: Stable    Plan: woggle stitch to remain in place, remove tomorrow or Monday.      PROCEDURE    Patient Tolerance:  Patient tolerated the procedure well with no immediate complications  Length of time physician/provider present for 1:1 monitoring during sedation: 120      "

## 2022-01-15 NOTE — CONSULTS
[facility]: [place]  NeuroCritical Care Consult    Patient Name:  Kimmy Gerardo  MRN:  [id number]    :  1980  Date of Service:  January 15, 2022  Primary care provider:  No Ref-Primary, Physician      Reason for Consult: Asked by Critical Care Cardiology to see Kimmy Gerardo following a cardiac arrest.    History of Present Illness:   Kimmy Gerardo is a 41 year old female admitted on 1/15/2022 with a PMH of recent epigastric hernia s/p repair (22) who presents following an out of hospital PEA arrest.    Patient had been home recovering from an epigastric hernia repair that took place just two days ago. The day following surgery it was reported that she was feeling fatigued. This morning while in the bathroom she became pale and felt clammy. Her spouse assisted her back into the bedroom when she became unresponsive at which point he called EMS. Bystander CPR was performed by spouse until EMS arrived in which they took over. Her initial rhythm was PEA and was able to achieve ROSC with CPR. However, while in route to the hospital she arrested two additional times. She received more rounds of CPR and 1 mg of epinephrine with ROSC. Heart rhythm while in the ED was noted to be atrial fibrillation which shortly converted to sinus rhythm.    Patient was sent emergently for CT of the head as well as chest/abdomen/pelvis, confirming a large saddle pulmonary embolism with right heart strain. PERT was activated through [facility] and patient was transferred for emergent mechanical thrombectomy. NCC team will evaluate for neuro prognostication following her cardiac arrest.         ROS: History obtained by chart review. The patient is intubated and sedated.    PMH:  No past medical history on file.  Past Surgical History:   Procedure Laterality Date     IR PULMONARY ANGIOGRAM BILATERAL  1/15/2022       Allergies:  No Known Allergies    Medications:      Current Facility-Administered  Medications:      albuterol (PROVENTIL HFA/VENTOLIN HFA) inhaler, 6 puff, Inhalation, Q4H, Arcelia Owens APRN CNP     Anticoagulant order placed during this visit, , Does not apply, Continuous PRN, Donavan Pedraza MD     artificial tears ophthalmic ointment, , Both Eyes, Q8H, Arcelia Owens APRN CNP, Given at 01/15/22 1541     cisatracurium (NIMBEX) 200 mg in D5W 100 mL infusion, 3-10 mcg/kg/min (Dosing Weight), Intravenous, Continuous **AND** artificial tears ophthalmic ointment, , Both Eyes, Q8H, Arcelia Owens APRN CNP     calcium chloride in  mL intermittent infusion 1 g, 1 g, Intravenous, Q6H PRN, Arcelia Owens APRN CNP     ceFEPIme (MAXIPIME) 1g vial to attach to  ml bag for ADULTS or NS 50 ml bag for PEDS, 1 g, Intravenous, Q8H, Arcelia Owens APRN CNP     fentaNYL (SUBLIMAZE) 50 mcg/mL bolus from infusion pump 50 mcg, 50 mcg, Intravenous, Q30 Min PRN, Arcelia Owens APRN CNP, 50 mcg at 01/15/22 1540     fentaNYL (SUBLIMAZE) infusion,  mcg/hr, Intravenous, Continuous, Arcelia Owens APRN CNP, Last Rate: 1 mL/hr at 01/15/22 1521, 50 mcg/hr at 01/15/22 1521     heparin infusion 25,000 units in D5W 250 mL ANTICOAGULANT, 0-5,000 Units/hr, Intravenous, Continuous, Arcelia Owens APRN CNP, Last Rate: 11 mL/hr at 01/15/22 1542, 1,100 Units/hr at 01/15/22 1542     lidocaine (LMX4) cream, , Topical, Q1H PRN, Arcelia Owens APRN CNP     lidocaine 1 % 0.1-1 mL, 0.1-1 mL, Other, Q1H PRN, Arcelia Owens APRN CNP     magnesium sulfate 2 g in water intermittent infusion, 2 g, Intravenous, Daily PRN, Arcelia Owens APRN CNP     magnesium sulfate 4 g in 100 mL sterile water (premade), 4 g, Intravenous, Q4H PRN, Arcelia Owens APRN CNP     midazolam (VERSED) drip - ADULT 100 mg/100 mL in NS (pre-mix), 1-8 mg/hr, Intravenous, Continuous, Arcelia Owens APRN CNP, Last Rate: 8 mL/hr at 01/15/22 1501, 8 mg/hr at 01/15/22 1501     midazolam (VERSED) injection 1-3 mg, 1-3 mg,  Intravenous, Q1H PRN, Arcelia Owens APRN CNP     naloxone (NARCAN) injection 0.2 mg, 0.2 mg, Intravenous, Q2 Min PRN **OR** naloxone (NARCAN) injection 0.4 mg, 0.4 mg, Intravenous, Q2 Min PRN **OR** naloxone (NARCAN) injection 0.2 mg, 0.2 mg, Intramuscular, Q2 Min PRN **OR** naloxone (NARCAN) injection 0.4 mg, 0.4 mg, Intramuscular, Q2 Min PRN, Brooks Briscoe MD     norepinephrine (LEVOPHED) 16 mg in  mL infusion MAX CONC CENTRAL LINE, 0.01-0.6 mcg/kg/min (Dosing Weight), Intravenous, Continuous PRN, Arcelia Owens APRN CNP     pantoprazole (PROTONIX) IV push injection 40 mg, 40 mg, Intravenous, Daily, Arcelia Owens APRN CNP, 40 mg at 01/15/22 1516     potassium chloride 20 mEq in 50 mL intermittent infusion, 20 mEq, Intravenous, Q1H PRN, Arcelia Owens APRN CNP     propofol (DIPRIVAN) infusion, 5-75 mcg/kg/min (Dosing Weight), Intravenous, Continuous, Arcelia Owens APRN CNP, Last Rate: 18 mL/hr at 01/15/22 1517, 50 mcg/kg/min at 01/15/22 1517     sodium chloride (PF) 0.9% PF flush 3 mL, 3 mL, Intracatheter, Q8H PRRoman VELASQUEZ Anne D, APRN CNP     sodium chloride (PF) 0.9% PF flush 3 mL, 3 mL, Intracatheter, q1 min prn, Arcelia Owens APRN CNP     sodium phosphate 10 mmol in D5W 250 mL intermittent infusion, 10 mmol, Intravenous, Daily PRNRoman Anne D, APRN CNP     sodium phosphate 15 mmol in D5W intermittent infusion, 15 mmol, Intravenous, Daily PRN, Arcelia Owens APRN CNP     sodium phosphate 20 mmol in D5W 250 mL intermittent infusion, 20 mmol, Intravenous, Q6H PRNRoman Anne D, APRN CNP     sodium phosphate 25 mmol in D5W 250 mL intermittent infusion, 25 mmol, Intravenous, Q8H PRN, Arcelia Owens, ANNE MARIE CNP     vancomycin 1250 mg in 0.9% NaCl 250 mL intermittent infusion 1,250 mg, 20 mg/kg (Dosing Weight), Intravenous, Q24H, Brooks Briscoe MD     vasopressin 1 unit/mL MAX Conc (PITRESSIN) infusion, 0.5-4 Units/hr, Intravenous, Continuous, Arcelia Owens, APRN CNP, Held at  "01/15/22 1517    Social History:  Unknown    Family History:    Unknown, patient was adopted.     Physical Examination:   BP (!) 127/116   Pulse 105   Temp 98.4  F (36.9  C)   Resp 24   Ht 1.676 m (5' 6\")   SpO2 100%   BMI 21.35 kg/m    EXAM: Examined while heavily sedated, paralyzed, and during cooling protocol  - No spontaneous eye opening or in response to verbal or tactile stimuli  - No eye movements, conjugate gaze  - Pupils pinpoint, remain reactive with pupillometer   - No cough or gag present with deep suctioning  - No limb movements     Imagin. CT Chest PE Abdomen Pelvis w contrast on 1/15/22 at 1031  Impression:  1. Large saddle pulmonary embolism. Dilated right cardiac chambers worrisome for cardiac strain.  2. Left femoral arterial line is noted. While this vessel appears patent, at the entrance site of the arterial femoral line, there is surrounding low-density that could represent a degree of hematoma within the femoral artery wall. Consider further   assessment with vascular ultrasound.  3. ET tube tip is just 1.4 cm proximal to the nava. Left groin approach central venous line tip is at the most superior aspect of the IVC.  4. Moderate wall thickening of the gallbladder that appears edematous. Correlate with any gallbladder symptoms.    2. CT Head w/o contrast on 1/15/22 at 1029  Impression:  1. Normal head CT.    Impression:  Kimmy Gerardo is a 41 year old admitted on 1/15/2022 following a home cardiac arrest 2/2 large saddle pulmonary embolism s/p mechanical thrombectomy.    Recommendations:   Patient deeply sedated, paralyzed, and cooled post cardiac arrest. Initial CT head with no abnormality. Bedside nurse reported first pupil check with pupillometer as being asymmetric with R eye larger (5mm) with an NPI < 3. L eye smaller within 1-2mm with NPI >3. Recommend following pupils closely with pupillometer as needed. Upon my examination they were pinpoint and reactive. When " sedation and paralytic is able to be safely lifted, a more accurate neurological evaluation will be possible. After sedation is lifted, if she continues to be encephalopathic or has neurological abnormalities, advanced imaging with MRI may be indicated.    Neuro:  #PEA arrest  #Risk for hypoxic injury  - vEEG, will follow up prelim read   - Avoid hypotonic solutions as they may worsen cerebral edema  - Avoid nitroprusside/nitroglycerin as able - can increase ICP's   - Advise slow correction of any high sodiums if needed  - When safe to do so, limitation of CNS acting medications will permit a more accurate neurological assessment  - We will continue to follow and monitor her EEG and neurologic exam, please call [phone] with any questions      Saranya KENNEY CNP  NeuroCritical Care Nurse Practitioner  Pager: [phone]  Ascom: *[phone] available M-[place] [phone] to [phone]

## 2022-01-15 NOTE — ED TRIAGE NOTES
Pt presents via EMS for cardiac arrest. Lost pulse x3 x3 ROSC. Possible down time 45min. Hernia repair surgery Thursday. Today complaints of N/V.  checked on pt thought was sleeping, checked on her 45min later when unresponsive. 911 called. Pt found without a pulse arrest called at 0857. CPR initiated x1 epi 1st ROSC @ 0859. x2 lost pulse x2 enroute and x2 ROSC. CPR being done on arrival. ET tube inplace on arrival.

## 2022-01-15 NOTE — H&P
Cardiology ICU History and Physical     Kimmy Gerardo  MRN# [id number]        Date of Admission:  1/15/2022    HPI: Kimmy Gerardo is a 41 year old female with PMHx of ventral hernia s/p repair who presents after out of hospital cardiac arrest.      Per report, patient underwent recent ventral hernia repair at [facility] 1/13/2022.  She had been progressing well postoperatively until yesterday, she began feeling  Tired. Then early this morning, the patient was in the bathroom and became pale and clammy. He assted her back into the bedroom.  While on the floor, she became unresponsive at which point she called EMS.  He did attempt CPR.  He ran downstairs when EMS arrived who promptly performed CPR.  Rhythm not shockable. She did achieve ROSC but did arrest two additional times en route and had ongoing CPR on arrival to [facility] ED. She was given 1 mg of epinephrine. She was intubated and taken to CT scanner which revealed large saddle PE with concern for right heart strain, possible right fem artery hematoma and edematous gallbladder. CTH also performed which revealed no acute intracranial pathology. She was transferred to [facility] where she was taken urgently to IR suite for thrombectomy. Other initial labs notable for elevated LFT's, Lactic 14.4-->8.9. Crt 1.18,  mag 3.1.    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    Medications:  [COMPLETED] 0.9% sodium chloride BOLUS  [COMPLETED] calcium gluconate 10 % injection 1 g  [COMPLETED] EPINEPHrine  (ADRENALIN) injection 1 mg  [COMPLETED] heparin ANTICOAGULANT Loading dose for HIGH INTENSITY TREATMENT * Give BEFORE starting heparin infusion  [COMPLETED] iopamidol (ISOVUE-370) solution 500 mL  [COMPLETED] midazolam (VERSED) injection 2 mg  [COMPLETED] naloxone (NARCAN) injection 2 mg  [COMPLETED] pantoprazole (PROTONIX) IV push injection 40 mg  [COMPLETED] propofol (DIPRIVAN) bolus from infusion pump 20 mg  [COMPLETED] sodium chloride 0.9 % bag 500mL for CT scan flush use    No current outpatient medications on file prior to encounter.      Social History:  Social History     Socioeconomic History     Marital status:      Spouse name: Not on file     Number of children: Not on file     Years of education: Not on file     Highest education level: Not on file   Occupational History     Not on file   Tobacco Use     Smoking status: Not on file     Smokeless tobacco: Not on file   Substance and Sexual Activity     Alcohol use: Not on file     Drug use: Not on file     Sexual activity: Not on file   Other Topics Concern     Not on file   Social History Narrative     Not on file     Social Determinants of Health     Financial Resource Strain: Not on file   Food Insecurity: Not on file   Transportation Needs: Not on file   Physical Activity: Not on file   Stress: Not on file   Social Connections: Not on file   Intimate Partner Violence: Not on file   Housing Stability: Not on file       Family History:  No family history on file.    ROS:Negative besides that noted in HPI    Exam:  Temp:  [92.1  F (33.4  C)-97.6  F (36.4  C)] 97.6  F (36.4  C)  Pulse:  [] 103  Resp:  [11-56] 22  BP: ()/() 97/71  MAP:  [67 mmHg-112 mmHg] 89 mmHg  Arterial Line BP: ()/(54-93) 116/69  FiO2 (%):  [40 %-60 %] 60 %  SpO2:  [91 %-100 %] 100 %  General: In bed, in NAD  HEENT: PERRL, no scleral icterus or injection  CARDIAC: Tachycardic, no m/r/g appreciated. Peripheral pulses dopplered  RESP: Mechanical  ventilation; CTAB, no wheezes, rhonchi or crackles appreciated.  GI: soft, BS hypoactive  : [place]  EXTREMITIES: NO LE edema, pulses 2+. Femoral access site w/o bleeding, dressing c/d/i. No bruits appreciated.   SKIN: No acute lesions appreciated  NEURO: Intubated and sedated    Data:  CMP  Recent Labs   Lab 01/15/22  1258 01/15/22  0934 01/15/22  0929    138  --    POTASSIUM 4.6 3.4  --    CHLORIDE 112* 103  --    CO2 19* 12*  --    ANIONGAP 7 23*  --    * 381* 325*   BUN 20 14  --    CR 0.90 1.18*  --    GFRESTIMATED 82 60*  --    LUDA 7.9* 8.7  --    MAG 2.1 3.1*  --    PROTTOTAL  --  6.4*  --    ALBUMIN  --  3.2*  --    BILITOTAL  --  0.3  --    ALKPHOS  --  37*  --    AST  --  247*  --    ALT  --  233*  --      CBC  Recent Labs   Lab 01/15/22  1258 01/15/22  0934   WBC 16.4* 15.4*   RBC 4.36 4.27   HGB 13.9 13.5   HCT 42.3 46.6   MCV 97 109*   MCH 31.9 31.6   MCHC 32.9 29.0*   RDW 12.8 12.6    93*       No results found for: TROPI, TROPONIN, TROPR, TROPN      EKG: Pending  Echo: Pending, to be done tomorrow    Assessment and plan by system:   Neurology   # Concern for anoxic brain injury    -- Intubated, sedated. Cooled to 34 degrees.   -- Continue versed, fentanyl for sedation with a RASS goal -4 to -5   -- Neuro-crit consulted and appreciate recommendations.   -- vEEG   -- Palliative care consulted.      Cardiovascular  # Out of hospital cardiac arrest, PEA  # Right heart strain 2/2 PE, as belowTTE:   EKG: pending  -- Telemetry monitoring  -- Troponin low 57     Pulmonary  # Massive saddle PE s/p thrombectomy  # Acute hypoxemic respiratory failure requiring intubation  # Probable aspiration pneumonia  Vent Settings: CMV/rate/TV/PEEP/fio2%  AB.1/42/111/13  CXR pending.   CAP CT with  Gallbladder edema and saddle PE  -- Wean vent as able  -- Daily CXR  -- Serial ABGs   -- Consider scheduled duonebs if signs of lung dz, currently PRN    -- Heparin for PE's    Infectious Disease  #  Aspiration Pneumonia- in the setting of arrest.  CXR/CAP as above.   # Leukocytosis in setting of arrest/PE  WBC 15.4  -- Monitor for signs of infection  -- Blood cultures pending  Cultures thus far:   - pending  Antibiotics               - Vancomycin -1/15- present               - Cefepime -1/15- present      Renal, Electrolytes  # Acute Renal Insufficiency   # Hypermagnesemia  # Hypoalbuminemia   # Lactic acidosis- improving  Lactic acidosis 14.4-->8.9  -- Cr 1.18, BUN 14  -- Monitor urine output        Gastrointestinal, Nutrition  # Shock liver 2/2 cardiac arrest  # Gallbladder edema   No known medical hx.   CAP CT as above  -- Monitor LFTs  -- NPO while cooled - Nutrition consult pending feeding tube placement once warmed   -- Bowel regimen - on hold for now due to hypothermia  -- GI Prophylaxis: PPI; Protonix 40 mg daily     Hematology  # Thrombocytopenia  # Saddle PE  Hgb stable. No e/o bleeding.   -- Receiving heparin , high intensity  -- Transfuse for Hgb < 8  -- DVT PPX: Heparin as above     Endocrinology  # Hyperglycemia in the setting critical illness  -- No known medical history.   -- insulin gtt as needed  -- Hgb a1c pending    Integumentary:  - No skin issues    Patient seen and discussed with staff physician.    ANNE MARIE Spann, CNP  [facility]  Interventional Cardiology-I Service  Pager [phone]     Labs (Past three days):  CBC  Recent Labs   Lab 01/15/22  1258 01/15/22  0934   WBC 16.4* 15.4*   RBC 4.36 4.27   HGB 13.9 13.5   HCT 42.3 46.6   MCV 97 109*   MCH 31.9 31.6   MCHC 32.9 29.0*   RDW 12.8 12.6    93*     BMP  Recent Labs   Lab 01/15/22  1258 01/15/22  0934 01/15/22  0929    138  --    POTASSIUM 4.6 3.4  --    CHLORIDE 112* 103  --    CO2 19* 12*  --    ANIONGAP 7 23*  --    * 381* 325*   BUN 20 14  --    CR 0.90 1.18*  --    GFRESTIMATED 82 60*  --    LUDA 7.9* 8.7  --    MAG 2.1 3.1*  --      Troponins:     INR  Recent Labs   Lab  01/15/22  0934   INR 1.66*     Liver panel  Recent Labs   Lab 01/15/22  0934   PROTTOTAL 6.4*   ALBUMIN 3.2*   BILITOTAL 0.3   ALKPHOS 37*   *   *       Imaging/procedure results:  CT Chest (PE) Abdomen Pelvis w Contrast  Narrative: EXAM: CT CHEST PE, ABDOMEN AND PELVIS WITH CONTRAST  LOCATION: [facility]  DATE/TIME: 01/15/2022, 10:21 AM    INDICATION: Cardiac arrest.  COMPARISON: None.  TECHNIQUE: CT chest pulmonary angiogram and routine CT abdomen pelvis with IV contrast. Arterial phase through the chest and venous phase through the abdomen and pelvis. Multiplanar reformats and MIP reconstructions were performed. Dose reduction   techniques were used.   CONTRAST: 64 mL Isovue-370.    FINDINGS:  ANGIOGRAM CHEST: Positive large saddle pulmonary embolism. Thrombus burden leads to all lobes of both lungs. Thoracic aorta is negative for dissection. Dilated right cardiac chambers and flattening of the interventricular septum.     LUNGS AND PLEURA: No effusions. Motion artifact limits detailed assessment of the lungs. No acute pulmonary consolidation. Minor bibasilar atelectasis.    MEDIASTINUM/AXILLAE: ET tube identified, its tip measuring 1.4 cm proximal to the nava. Nasogastric tube in the stomach. No suspicious lymph node. Pectus excavatum.    CORONARY ARTERY CALCIFICATION: None.    HEPATOBILIARY: Heterogeneous perfusion of the liver. Periportal edema is noted throughout the liver. There is moderate edema of the gallbladder wall.    PANCREAS: Normal.    SPLEEN: Normal.    ADRENAL GLANDS: Normal.    KIDNEYS/BLADDER: Normal.    BOWEL: No obstruction. A few bowel loops mildly distended with fluid. Mild edema of the bowel.    LYMPH NODES: Normal.    VASCULATURE: No acute abdominal aortic abnormality. Left groin approach venous catheter extends superiorly and ends at the most superior aspect of the IVC. Left femoral arterial line in place. The left femoral artery does appear to be  patent, though   there is surrounding wall thickening that is low-density in this area series 11 image 166 and on adjacent images.    PELVIC ORGANS: There is edema at the left inguinal subcutaneous fat.    MUSCULOSKELETAL: No large hematoma can be seen in the chest, abdomen, or pelvis. No acute or aggressive bony abnormality.  Impression: IMPRESSION:  1.  Large saddle pulmonary embolism. Dilated right cardiac chambers worrisome for cardiac strain.  2.  Left femoral arterial line is noted. While this vessel appears patent, at the entrance site of the arterial femoral line, there is surrounding low-density that could represent a degree of hematoma within the femoral artery wall. Consider further   assessment with vascular ultrasound.  3.  ET tube tip is just 1.4 cm proximal to the nava. Left groin approach central venous line tip is at the most superior aspect of the IVC.  4.  Moderate wall thickening of the gallbladder that appears edematous. Correlate with any gallbladder symptoms.    [Critical Result: Saddle pulmonary embolism. Findings worrisome for right cardiac strain.]    Finding was identified on 01/15/2022 at 10:56 AM.     Dr. Bonilla was contacted by me on 01/15/2022 at 11:05 AM and verbalized understanding of the critical result.   Head CT w/o contrast  Narrative: EXAM: CT HEAD W/O CONTRAST  LOCATION: [facility]  DATE/TIME: 1/15/2022 10:21 AM    INDICATION: Cardiac arrest  COMPARISON: None.  TECHNIQUE: Routine CT Head without IV contrast. Multiplanar reformats. Dose reduction techniques were used.    FINDINGS:  INTRACRANIAL CONTENTS: No intracranial hemorrhage, extraaxial collection, or mass effect.  No CT evidence of acute infarct. Normal parenchymal attenuation. Normal ventricles and sulci.     VISUALIZED ORBITS/SINUSES/MASTOIDS: No intraorbital abnormality. No paranasal sinus mucosal disease. No middle ear or mastoid effusion.    BONES/SOFT TISSUES: No acute  abnormality.  Impression: IMPRESSION:  1.  Normal head CT.  XR Chest Port 1 View  Narrative: EXAM: XR CHEST PORTABLE 1 VIEW  LOCATION: [facility]  DATE/TIME: 01/15/2022, 9:52 AM    INDICATION: Cardiac arrest.  COMPARISON: None.  Impression: IMPRESSION:   1. ET tube is 2.7 cm proximal to the nava. Nasogastric tube tip at the proximal stomach.  2. No acute airspace disease or effusion. Normal cardiac silhouette.

## 2022-01-15 NOTE — ED NOTES
DATE:  1/15/2022   TIME OF RECEIPT FROM LAB:  1011  LAB TEST:  Lactic acid  LAB VALUE:  14.4  RESULTS GIVEN WITH READ-BACK TO (PROVIDER):  Bonilla  TIME LAB VALUE REPORTED TO PROVIDER:   1011

## 2022-01-15 NOTE — PHARMACY-VANCOMYCIN DOSING SERVICE
Pharmacy Vancomycin Initial Note  Date of Service January 15, 2022  Patient's  1980  41 year old, female    Indication: Aspiration Pneumonia    Current estimated CrCl = Estimated Creatinine Clearance: 78.7 mL/min (based on SCr of 0.9 mg/dL).    Creatinine for last 3 days  1/15/2022:  9:34 AM Creatinine 1.18 mg/dL; 12:58 PM Creatinine 0.90 mg/dL    Recent Vancomycin Level(s) for last 3 days  No results found for requested labs within last 72 hours.      Vancomycin IV Administrations (past 72 hours)      No vancomycin orders with administrations in past 72 hours.                Nephrotoxins and other renal medications (From now, onward)    Start     Dose/Rate Route Frequency Ordered Stop    01/15/22 1530  vancomycin 1250 mg in 0.9% NaCl 250 mL intermittent infusion 1,250 mg         20 mg/kg × 60 kg (Dosing Weight)  over 90 Minutes Intravenous EVERY 24 HOURS 01/15/22 1504      01/15/22 1430  vasopressin 1 unit/mL MAX Conc (PITRESSIN) infusion         0.5-4 Units/hr  0.5-4 mL/hr  Intravenous CONTINUOUS 01/15/22 1421      01/15/22 1421  norepinephrine (LEVOPHED) 16 mg in  mL infusion MAX CONC CENTRAL LINE         0.01-0.6 mcg/kg/min × 60 kg (Dosing Weight)  0.6-33.8 mL/hr  Intravenous CONTINUOUS PRN 01/15/22 1421            Contrast Orders - past 72 hours (72h ago, onward)            Start     Dose/Rate Route Frequency Ordered Stop    01/15/22 1300  iodixanol (VISIPAQUE 320) injection 150 mL         150 mL Intravascular ONCE 01/15/22 1254 01/15/22 1350              Plan:  1. Start vancomycin  1250 mg IV q24h- post cardiac arrest dosing protocol  2. Vancomycin monitoring method: Trough (Method 2 = manual dose calculation)  3. Vancomycin therapeutic monitoring goal: 15-20 mg/L  4. Pharmacy will check vancomycin levels as appropriate in 1-3 Days.    5. Serum creatinine levels will be ordered daily for the first week of therapy and at least twice weekly for subsequent weeks.      Prema Yang, PharmD

## 2022-01-15 NOTE — IR NOTE
Patient Name: Kimmy Gerardo  Medical Record Number: [id number]  Today's Date: 1/15/2022    Procedure: Pulmonary thrombectomy   Proceduralist: Dr. Dahlia Farrell  Pathology present: NA    Procedure Start: 1246  Procedure end: 1401  Sedation medications administered: continued propofol and versed from ED    Report given to: Erica CLARK 4E  : REN    Other Notes: Pt arrived to IR room 1 from ED. Consent reviewed.Pt positioned supine and monitored per protocol. Pt tolerated procedure without any noted complications. Pt transferred back to .    2,000 units iv heparin bolused at 1301    Woggle closure to right groin. Bedrest for 3 hours until 1700.

## 2022-01-15 NOTE — ED NOTES
DATE:  1/15/2022   TIME OF RECEIPT FROM LAB:  1114  LAB TEST:  Lactic Acid  LAB VALUE:  8.9  RESULTS GIVEN WITH READ-BACK TO (PROVIDER):  Bonilla  TIME LAB VALUE REPORTED TO PROVIDER:   1111

## 2022-01-15 NOTE — ED NOTES
Pt arrived at 0920. ETT in place on arrival, CPR in process on arrival @ 0920.     Initial rhythm asystole.     1mg epi given @ 0922,   2mg Narcan given @ 0923.     At 2 minute pulse check at @ 0924 pulse was found, ROSC w/ sinus tach.     @ 0930 blood glucose 325    R pupil found to be irregular 6mm, L pupil nonreactive 4mm.     @ 0930 1 amp calcium gluconate given, 1L NS started  @ 0942 prop 20 bolus given, continuous 20mg/kg started  @ 0944 peripheral norepi 4mg/250ml started  @0950 L femoral triple lumen CVC placed  @ 0955 L femoral art line placed   @ 0955 OG tube placed  @ 1000 temp sensing goncalves place & thermagard started goal temp 36  portable chest XR obtained, CT head & PE study    At approx 1045 pt continued to try and breath over the ETT even after titrating drips. MD notified. Meds added.      @1057 Fentanyl 25mg given  @ 1105 versed 1mg/hr started  @ 1124 4,500 units Heparin bolus given, then 1,100units/hr started    Intermit red output from OG tube, MD notified, meds ordered.     @ 1148 40mg protonix given     Report called to [facility] ED and IR department. All questions answered at this time. In person report given to EMS all questions answered at this time.

## 2022-01-15 NOTE — ED PROVIDER NOTES
History   Chief Complaint:  Cardiac Arrest    The history is provided by the EMS personnel. The history is limited by the condition of the patient.      Kimmy Gerardo is a 41 year old female who presents with cardiac arrest.  Per EMS, and later per , patient underwent recent hernia repair at [facility].  She had been progressing well postoperatively, and has been reports feeling well on Thursday (2 days previous).  Yesterday, she began feeling somewhat tired.  Early this morning, the  noted the patient to be in the bathroom, where she had a large loose bowel movement.  He noted that she appeared pale and clammy, and assisted her back into the bedroom.  While on the floor, she became unresponsive at which point she called EMS.  He did attempt CPR.  He ran downstairs when EMS arrived who promptly performed CPR.  She did achieve ROSC, though and route, did arrest two additional times.  She was provided 1 mg of epinephrine.  She arrives to the ER with CPR in progress.    Per , patient has no prior history of known cardiac disease.  She is adopted, and he is a little unsure of the patient's family history.  He denies any known history of cardiac arrhythmia.  No prior history of PE nor DVT.    Review of Systems   Unable to perform ROS: Acuity of condition     Allergies:  No Known Allergies    Medications:    Percocet  Zyrtec    Past Medical History:    Epigastric hernia    Past Surgical History:    Epigastric herniorrhaphy    Social History:  Presents alone via EMS   later presented    Physical Exam     Patient Vitals for the past 24 hrs:   BP Temp Pulse Resp SpO2 Weight   01/15/22 1145 -- -- 112 24 99 % --   01/15/22 1140 106/86 -- 110 26 100 % --   01/15/22 1135 104/67 -- 109 24 100 % --   01/15/22 1130 126/79 -- 112 27 99 % --   01/15/22 1125 108/82 -- 110 22 100 % --   01/15/22 1120 (!) 125/101 -- 112 (!) 37 99 % --   01/15/22 1115 (!) 140/82 -- 110 (!) 31 98 % --    01/15/22 1110 -- (!) 92.7  F (33.7  C) 107 25 94 % --   01/15/22 1105 -- (!) 92.7  F (33.7  C) 106 20 94 % --   01/15/22 1100 -- (!) 92.1  F (33.4  C) 101 22 94 % --   01/15/22 1055 -- -- 103 26 97 % --   01/15/22 1050 -- -- 102 22 94 % --   01/15/22 1045 -- -- 104 28 99 % --   01/15/22 1040 -- -- 103 23 95 % --   01/15/22 1035 108/69 -- 100 22 91 % --   01/15/22 1030 -- -- -- 28 98 % --   01/15/22 1010 -- -- 104 -- -- --   01/15/22 1005 100/70 -- 104 (!) 56 100 % --   01/15/22 1000 102/72 -- 103 (!) 32 100 % --   01/15/22 0955 102/70 -- 104 28 100 % --   01/15/22 0950 130/79 -- 105 22 100 % --   01/15/22 0945 118/70 -- 102 23 100 % --   01/15/22 0940 (!) 84/64 -- 97 17 100 % --   01/15/22 0935 102/66 -- 96 11 100 % --   01/15/22 0930 (!) 89/48 -- 97 11 -- 60 kg (132 lb 4.4 oz)   01/15/22 0925 -- -- 104 20 -- --   01/15/22 0924 122/73 -- (!) 124 (!) 38 -- --       Physical Exam  General:   Thin middle age female   Unresponsive  Eyes:   Pupils 5 mm bilaterally, fixed   Right pupil has slight irregularity to it  ENT:   Moist mucous membranes   Nares patent   Pinnae normal  Neck:   Full ROM   No stiffness appreciated  Resp:   Lungs CTAB   No crackles, wheezing or audible rubs   Good air movement  CV:    Irregularly irregular rate and rhythm   S1 and S2 present   No murmur, gallop or rub  GI:   BS present   Abdomen soft without distention   Non-tender to light and deep palpation   Prior surgical scar noted to infra-umbilical region   No guarding or rebound tenderness  Skin:   Warm, dry, well perfused   Distal extremities are cool to palpation with delayed cap refill   Central pulses are palpable  MSK:   Moves all extremities   No focal deformities or swelling  Neuro:   Unresponsive   Does not withdrawal to painful stimuli through all extremities  Psych:   Unable to assses        Emergency Department Course   ECG: Cardiac Arrest  ECG taken at 0927, ECG read at 1017  Sinus tachycardia  Possible left atrial  enlargement  Anterior infarct, age undetermined  T wave abnormality, consider inferior ischemia   Rate 104 bpm. PA interval 198 ms. QRS duration 92 ms. QT/QTc 376/94 ms. P-R-T axes 72 37 -34.     Imaging:  CT Chest (PE) Abdomen Pelvis w Contrast   Preliminary Result   Abnormal   IMPRESSION:   1.  Large saddle pulmonary embolism. Dilated right cardiac chambers worrisome for cardiac strain.   2.  Left femoral arterial line is noted. While this vessel appears patent, at the entrance site of the arterial femoral line, there is surrounding low-density that could represent a degree of hematoma within the femoral artery wall. Consider further    assessment with vascular ultrasound.   3.  ET tube tip is just 1.4 cm proximal to the nava. Left groin approach central venous line tip is at the most superior aspect of the IVC.   4.  Moderate wall thickening of the gallbladder that appears edematous. Correlate with any gallbladder symptoms.         [Critical Result: Saddle pulmonary embolism. Findings worrisome for right cardiac strain.]      Finding was identified on 01/15/2022 at 10:56 AM.       Dr. Bonilla was contacted by me on 01/15/2022 at 11:05 AM and verbalized understanding of the critical result.          Head CT w/o contrast   Final Result   IMPRESSION:   1.  Normal head CT.      XR Chest Port 1 View   Preliminary Result   IMPRESSION:    1. ET tube is 2.7 cm proximal to the nava. Nasogastric tube tip at the proximal stomach.   2. No acute airspace disease or effusion. Normal cardiac silhouette.             Laboratory:  Labs Ordered and Resulted from Time of ED Arrival to Time of ED Departure   GLUCOSE BY METER - Abnormal       Result Value    GLUCOSE BY METER POCT 325 (*)    INR - Abnormal    INR 1.66 (*)    PARTIAL THROMBOPLASTIN TIME - Abnormal    aPTT 43 (*)    COMPREHENSIVE METABOLIC PANEL - Abnormal    Sodium 138      Potassium 3.4      Chloride 103      Carbon Dioxide (CO2) 12 (*)     Anion Gap 23 (*)     Urea  Nitrogen 14      Creatinine 1.18 (*)     Calcium 8.7      Glucose 381 (*)     Alkaline Phosphatase 37 (*)      (*)      (*)     Protein Total 6.4 (*)     Albumin 3.2 (*)     Bilirubin Total 0.3      GFR Estimate 60 (*)    LACTIC ACID WHOLE BLOOD - Abnormal    Lactic Acid 14.4 (*)    TROPONIN I - Abnormal    Troponin I High Sensitivity 57 (*)    MAGNESIUM - Abnormal    Magnesium 3.1 (*)    CBC WITH PLATELETS AND DIFFERENTIAL - Abnormal    WBC Count 15.4 (*)     RBC Count 4.27      Hemoglobin 13.5      Hematocrit 46.6       (*)     MCH 31.6      MCHC 29.0 (*)     RDW 12.6      Platelet Count 93 (*)    LACTIC ACID WHOLE BLOOD - Abnormal    Lactic Acid 8.9 (*)    DIFFERENTIAL - Abnormal    % Neutrophils 31      % Lymphocytes 64      % Monocytes 5      % Eosinophils 0      % Basophils 0      Absolute Neutrophils 4.8      Absolute Lymphocytes 9.9 (*)     Absolute Monocytes 0.8      Absolute Eosinophils 0.0      Absolute Basophils 0.0      RBC Morphology Confirmed RBC Indices      Platelet Assessment        Value: Automated Count Confirmed. Platelet morphology is normal.   ISTAT GASES LACTATE VENOUS POCT - Abnormal    Lactic Acid POCT 14.4 (*)     pCO2V Venous POCT 99 (*)     pH Venous POCT <7.00 (*)     pO2 Venous POCT 65 (*)    HCG QUALITATIVE PREGNANCY - Normal    hCG Serum Qualitative Negative     ETHYL ALCOHOL LEVEL   COVID-19 VIRUS (CORONAVIRUS) BY PCR   ABO/RH TYPE AND SCREEN       [facility]    -Arterial Line    Date/Time: 1/15/2022 10:06 AM  Performed by: Wong Bonilla MD  Authorized by: Wong Bonilla MD     Risks, benefits and alternatives discussed.      INDICATIONS:   Indications: hemodynamic monitoring and multiple ABGs      PRE-PROCEDURE DETAILS:   Skin preparation:  2% Chlorhexidine  PROCEDURE DETAILS:    Location:  L femoral  Needle gauge:  20 G  Placement technique:  Seldinger and ultrasound guided  Number of attempts:  1  Transducer: waveform  confirmed      POST PROCEDURE DETAILS:    Post-procedure:  Sutured, biopatch applied and secured with tape  CMS:  Unchanged      PROCEDURE    Patient Tolerance:  [facility]    -Central Line    Date/Time: 1/15/2022 10:06 AM  Performed by: Wong Bonilla MD  Authorized by: Wong Bonilla MD     Risks, benefits and alternatives discussed.      PRE-PROCEDURE DETAILS:     Hand hygiene: Hand hygiene performed prior to insertion      Sterile barrier technique: All elements of maximal sterile technique followed      Skin preparation:  ChloraPrep    Skin preparation agent: Skin preparation agent completely dried prior to procedure         ANESTHESIA    Anesthesia: Local infiltration    PROCEDURE DETAILS:     Location:  L femoral    Patient position:  Flat    Procedural supplies:  Triple lumen    Landmarks identified: yes      Ultrasound guidance: yes      Sterile ultrasound techniques: Sterile gel and sterile probe covers were used      Number of attempts:  2    Successful placement: yes      POST PROCEDURE DETAILS:      Post-procedure:  Dressing applied and line sutured    Assessment:  Blood return through all ports, no pneumothorax on x-ray, free fluid flow and placement verified by x-ray    PROCEDURE    Patient Tolerance:  Patient tolerated the procedure well [facility]    -Intubation    Date/Time: 1/15/2022 10:06 AM  Performed by: Wong Bonilla MD  Authorized by: Wong Bonilla MD     Risks, benefits and alternatives discussed.    ED EVALUATION:      I have performed an Emergency Department Evaluation including taking a history and physical examination, this evaluation will be documented in the electronic medical record for this ED encounter.      ASA Class: Class 5- Severe systemic disease with imminent risk of death and Class 1- healthy patient     Mallampati: Grade 1- soft palate, uvula, tonsillar pillars, and posterior pharyngeal wall visible    NPO Status: not NPO, emergent situation    UNIVERSAL PROTOCOL   Site Marked: NA  Prior Images Obtained and Reviewed:  NA  Required items: Required blood products, implants, devices and special equipment available    Patient identity confirmed:  Verbally with patient  Patient was reevaluated immediately before administering moderate or deep sedation or anesthesia  Confirmation Checklist:  Correct equipment/implants were available  Universal Protocol: the Joint Commission Universal Protocol was followed      PRE-PROCEDURE DETAILS     Patient status:  Unresponsive    Mallampati score:  I    Pretreatment medications:  None    Paralytics:  None        SEDATION    Patient Sedated: No    Sedation:  Propofol and midazolam  Vital signs: Vital signs monitored during sedation    PROCEDURE DETAILS     Preoxygenation:  Bag valve mask    Intubation method:  Oral    Oral intubation technique:  Video-assisted    Laryngoscope blade:  Mac 3    Tube size (mm):  7.5    Tube type:  Cuffed    Number of attempts:  1    Cricoid pressure: yes      Tube visualized through cords: yes      PLACEMENT ASSESSMENT     Tube secured with:  ETT caicedo    Breath sounds:  Equal and absent over the epigastrium    Placement verification: chest rise, condensation, CXR verification, direct visualization, equal breath sounds, ETCO2 detector and tube exhalation      CXR findings:  ETT in proper place    PROCEDURE  Describe Procedure: I supervised our resident physician perform intubation and was present during the entire procedure.  Patient Tolerance:  Patient tolerated the procedure well with no immediate complications  Length of time physician/provider present for 1:1 monitoring during sedation: 0    Emergency Department Course:  Reviewed:  I reviewed nursing notes, vitals and past history    Assessments:  0922    I obtained history from EMS and examined  the patient.   0935    Patient was intubated.              I placed an arterial line              I placed a central line  1045 I rechecked the patient and explained findings to her .     Consults:   1107 I spoke with PERT team at the [facility] regarding transfer of this patient. Dr. Cabrera, cardiology, Dr. Taylor, cardiology, Dr. Duggan and Dr. Fernandes, CT surgery, Dr. Pedraza, IR and Dr. Medley, ED at [place].      Interventions:  0922 Epinephrine, 1 mg, IV  0924 Narcan, 2 mg, IV  0930 Calcium gluconate, 1 g, IV  0930 NS, 1 L, IV  0942 Propofol, 20 mg, IV  0943 Propofol, 20 mcg/kg/min, IV  0944 Norepinephrine, 0.03 mcg/kg/min, IV  0950 Versed, 2 mg, IV  1037 Propofol, 35 mcg/kg/min, IV  1057 Fentanyl, 25 mcg, IV  1105 Versed, 1 mg/hr, IV  1116 Propofol, 5 mcg/kg/min, IV   1116 Versed, 1 mg/hr, IV  1122 Propofol, 15 mcg/kg/min, IV  1124 Heparin loading dose, 4,800 unit(s), IV  1124 Heparin infusion, 1,100 unit(s)/hr, IV  1128 Propofol, 20 mcg/kg/min, IV  1133 Propofol, 25 mcg/kg/min, IV  1138 Propofol, 30 mcg/kg/min, IV  1141 Versed, 3 mg/hr, IV  1143 Propofol, 35 mcg/kg/min, IV  1148 Protonix, 40 mg, IV  1151 Norepinephrine, 0.03 mcg/kg/min, IV    Disposition:  The patient was transferred to [facility] via EMS. Dr. Medley accepted the patient for transfer with plans for proceeding directly to IR suite for mechanical thrombectomy, and subsequently cardiac ICU.    Impression & Plan      Medical Decision Making:  Kimmy Gerardo is a 41-year-old previously healthy female, POD #2 following hernia repair, presenting to the ER via EMS for evaluation of cardiac arrest. History, exam, and ED course as outlined above. Patient arrived with CPR in progress. She was transferred to the [facility], placed on the cardiac monitor, and resuscitation continued per ACLS protocols. Patient was provided 1 mg of IV epinephrine, and subsequently regained ROSC. Initial rhythm appeared to be atrial fibrillation, which shortly  thereafter converted to sinus rhythm. She did not receive defibrillation attempts prior to arrival. IV access established, and patient additionally had central venous and arterial access placed through the left femoral position including Thermo guard placement. Initial i-STAT VBG concerning for primary respiratory acidosis, though also suspect a component of metabolic acidosis as well. Given recent surgery, EKG demonstrated inferior and anterior T wave inversion, as well as bedside ultrasound findings, concern was for pulmonary embolism. Patient was sent emergently for CT of the head as well as chest/abdomen/pelvis, confirming large saddle pulmonary embolism, extending through all lobes of both lungs, as well as suggestion of right heart strain given flattening of the anterior ventricular septum and RV dilation. Fortunately, no evidence of intra-abdominal hemorrhage, or intracranial hemorrhage is noted. Note made of incidentally noted edema around the gallbladder wall, which at this point is of unclear significance.    PE response team activated through the [facility] and case discussed as above with multiple subspecialty services. At this time, recommendations are for emergent transfer for mechanical thrombectomy, and initiation of systemic heparin. We strongly considered systemic thrombolytics, though patient does have risk factors for bleeding in light of her current coagulopathy (elevated INR, PTT, and mild thrombocytopenia), as well as recent surgical intervention. Patient remained with a perfusing rhythm, and maintained adequate blood pressure with minimal vasopressor support during her entire ED course. At this time, following discussion with multiple sub-specialists, it was felt that optimal care would be emergent transfer and mechanical intervention. This was discussed at length with the patient's , Tao, as well as the risks/benefits of thrombolytic therapy. He is in agreement with the  above plan of care. Patient was maintained on very low-dose norepinephrine infusion, and additional sedation provided including propofol, and subsequently Versed/fentanyl. Repeat ABG reveals persistent acidosis, though is improving compared with previous, and hypercarbia has resolved. Laboratory evaluation otherwise notable for elevated lactate at 14.4, which did improve to 8.9 on recheck. Patient was transferred emergently via critical care EMS service for definitive treatment and care to the [facility].    Critical Care time:  110    Covid-19  Kimmygopi Gerardo was evaluated during a global COVID-19 pandemic, which necessitated consideration that the patient might be at risk for infection with the SARS-CoV-2 virus that causes COVID-19.   Applicable protocols for evaluation were followed during the patient's care.   COVID-19 was considered as part of the patient's evaluation. The plan for testing is:  a test was obtained during this visit.    Diagnosis:    ICD-10-CM    1. Cardiac arrest (H)  I46.9    2. Other acute pulmonary embolism with acute cor pulmonale (H)  I26.09      Scribe Disclosure:  Aracelis MENDEZ, am serving as a scribe at 10:03 AM on 1/15/2022 to document services personally performed by Wong Bonilla MD based on my observations and the provider's statements to me.      Wong Bonilla MD  01/15/22 [id number]

## 2022-01-16 NOTE — PROGRESS NOTES
"Admitted/transferred from: [facility]  0920. Transferred to [facility] IR 1255. Transferred to   Reason for admission/transfer: saddle PE procedure in IR  Interventions: Obtain US cartoid, EEG. Sedate w/ Propofol, Versed, and Fentanyl. Paralyze w/ Nimbex.   Plan: Keep sedated, paralyzed and cooled (cooling started 1/15 @ 1530)  2 RN skin assessment: completed by Goldie CRUZ And Erica MONREAL   Result of skin assessment and interventions/actions: Slight bump/bruise to forehead, bruise to left hand. Tattoos. Sacral mepliex applied preventatively.   Height, weight, drug calc weight: done  Patient belongings: None      -Pt had ventral hernia repair Thurs 1/13/22 at [facility]    -\"Woggle\" purse string stitch to right groin. Will remain in place until Sunday or Monday when IR comes to remove    -Thermoguard started at 1530 with goal 34C.     , Tao, updated at the bedside and physicians answered any questions he had.     -Cefepime and Vancomycin started. Magnesium replaced for 1.9.   -Next Hep 10A at 2330.    "

## 2022-01-16 NOTE — PROGRESS NOTES
Cardiology ICU Progress Note    Brief HPI:  Kimmy Gerardo is a 41 year old female with PMHx of ventral hernia s/p repair who presents after out of hospital cardiac arrest with ROSC 2/2 PE.       Per report, patient underwent recent ventral hernia 1/13/22 repair at [facility].  1/15 am,  noted patient to be pale and clammy after using bathroom and then became unresponsive. He started CPR and EMS was called, arriving shortly thereafter. Initial rhythm not shockable. She did achieve ROSC but did arrest two additional times en route to hospital and had ongoing CPR on arrival to [facility] ED. She was intubated and taken to CT scanner which revealed large saddle PE with concern for right heart strain  She was transferred to [facility] where she was taken urgently to IR suite for pulmonary angiogram with thrombectomy. Pre thrombectomy PA pressures  50/26 (35) and post thrombectomy 35/15 (26). She was resumed on IV Heparin and transferred to Cardiology ICU for continued cares.        Subjective and Interval: No acute events overnight. Needed low dose Levo intermittently. Paralytic started for shivering. Hypotensive overnight and decreased UOP, CVP hooked up to fem line, 2L fluid given    Assessment and plan by system:   Today's changes:  Begin rewarming today 1530 slowly with 0.25 degree per hour  Once 36 degrees, discontinue paralytic and half versed, wean rest of sedation as able  Echo today pending  discontinue vanc-MRSA   500 ml fluid bolus, goal CVP less than 15, >10  Keep thermogard on to maintain normothermia once re-warmed     Assessment and plan by system:   Neurology   # Concern for anoxic brain injury    -- Intubated, sedated. Cooled to 34 degrees via left fem thermogard  -- Continue versed, fentanyl, Prop for sedation with a RASS goal -4 to -5. Did require Cis for paralytic/shivering overnight.  -- CTH also performed which revealed no acute intracranial pathology.  -- Neuro-crit consulted and  appreciate recommendations.   -- vEEG   -- Palliative care consulted.      Sedation/opioids/paralytics:  - Remains on Fentanyl 100 mcg/hour, Propofol @ 50 mcg/kg/mn, Versed at 8 mg/hr, Cis @ 3 mcg/kg/mn with BIS 20-40    Cardiovascular  # Out of hospital cardiac arrest, non shockable, with ROSC 2/2 PE  # Right heart strain 2/2 PE, as below  # Tachycardia, Sinus  TTE: pending, bedside with mildly reduced RV  EKG: TWI lead III and AVf  CVP 13  -- Telemetry monitoring  -- Troponin initial 57-->peaked at 3548--> downtrending     Pressors/antiarrythmics/inotropes:  None    Pulmonary  # Massive saddle PE s/p mechanical thrombectomy  # Acute hypoxemic respiratory failure requiring intubation  # Probable aspiration pneumonia  Pre thrombectomy PA pressures  50/26 (35) and post thrombectomy 35/15 (26).  Vent Settings: CMV/12/350/5/30%  AB.33/43/108/23  CXR without significant opacities, ETT/lines in stable position  CAP CT with  Gallbladder edema and saddle PE  -- Wean vent as able  -- Daily CXR  -- Serial ABGs   -- Consider scheduled duonebs if signs of lung dz, currently PRN    -- Heparin for PE as below     Infectious Disease  # Aspiration Pneumonia- in the setting of arrest.  CXR/CAP as above.   # Leukocytosis in setting of arrest/PE  WBC now normal, 8.1 (15.4) currently being cooled so no fevers  -- Monitor for signs of infection  -- Blood cultures/resp cultures pending  Cultures thus far:                - MRSA negative  Antibiotics               - Vancomycin -1/15-                - Cefepime -1/15- present      Renal, Electrolytes  # Hypoalbuminemia   Lactic acidosis resolved, now 0.6  -- Cr 0.47 (1.18) BUN 13  I/O's:   755 UOP yest, 250 more since midnight, net neg 105 ml yest, net + 2 L so far today  -- Monitor urine output  -- Avoid nephrotoxins        Gastrointestinal, Nutrition  # Shock liver 2/2 cardiac arrest  # Gallbladder edema   No known medical hx.   CAP CT as above  -- LFTs improving;  (294)  " (374) Tbili WNL  -- NPO while cooled - Nutrition consult pending /tube feeds once warmed if not extubated quickly  -- Bowel regimen - on hold for now due to hypothermia  -- GI Prophylaxis: PPI; Protonix 40 mg daily     Hematology  # Thrombocytopenia  # Saddle PE  # Anemia, acute blood loss and critical illness  Hgb slight drop 10.7 (13.5 on admission) Plt 79 (123, 93 on admission) No e/o bleeding.   -- Receiving heparin , high intensity for PE, start OAC coming days once warm  -- Transfuse for Hgb < 8  -- DVT PPX: Heparin as above     Endocrinology  # Hyperglycemia in the setting critical illness  -- No known medical history.   -- insulin gtt as needed, not currently needed  -- Hgb a1c pending     Integumentary:  - No skin issues     Patient seen and discussed with staff physician.     ANNE MARIE Spann, CNP  [facility]  Interventional Cardiology-CSI Service  Pager [phone]      Objective:  Most recent vital signs:  BP (!) 127/116   Pulse 76   Temp (!) 93.2  F (34  C)   Resp 12   Ht 1.676 m (5' 6\")   SpO2 100%   BMI 21.35 kg/m    Temp:  [91.8  F (33.2  C)-97.6  F (36.4  C)] 93.2  F (34  C)  Pulse:  [] 76  Resp:  [11-56] 12  BP: ()/() 127/116  MAP:  [59 mmHg-278 mmHg] 60 mmHg  Arterial Line BP: ()/() 86/45  FiO2 (%):  [30 %-60 %] 30 %  SpO2:  [91 %-100 %] 100 %  Wt Readings from Last 2 Encounters:   01/15/22 60 kg (132 lb 4.4 oz)       Intake/Output Summary (Last 24 hours) at 1/16/2022 0602  Last data filed at 1/16/2022 0500  Gross per 24 hour   Intake 3014.32 ml   Output 1120 ml   Net 1894.32 ml     Physical exam:  General: In bed, in NAD  HEENT: PERRL, no scleral icterus or injection  CARDIAC: RRR, no m/r/g appreciated. Peripheral pulses dopplered  RESP: Mechanical ventilation; CTAB, no wheezes, rhonchi or crackles appreciated.  GI: soft, BS hypoactive  : [place]  EXTREMITIES: NO LE edema, pulses 2+. Femoral access site w/o bleeding, " dressing c/d/i.    SKIN: No acute lesions appreciated  NEURO: Intubated and sedated, paralyzed    Labs (Past three days):  CBC  Recent Labs   Lab 01/16/22  0337 01/15/22  1550 01/15/22  1428 01/15/22  1258   WBC 8.1 13.8* 15.8* 16.4*   RBC 3.38* 4.08 4.07 4.36   HGB 10.7* 12.9 12.9 13.9   HCT 31.9* 38.2 38.5 42.3   MCV 94 94 95 97   MCH 31.7 31.6 31.7 31.9   MCHC 33.5 33.8 33.5 32.9   RDW 12.8 12.7 12.8 12.8   PLT 79* 123* 139* 157     BMP  Recent Labs   Lab 01/16/22  0343 01/16/22  0337 01/16/22  0336 01/15/22  2212 01/15/22  2208 01/15/22  1431 01/15/22  1428 01/15/22  1258 01/15/22  0934   NA  --   --  140  --   --   --  140 138 138   POTASSIUM  --   --  3.6  --  4.0  --  4.6 4.6 3.4   CHLORIDE  --   --  113*  --   --   --  114* 112* 103   CO2  --   --  22  --   --   --  20 19* 12*   ANIONGAP  --   --  5  --   --   --  6 7 23*   GLC 96 86 95 101*  --    < > 122* 223* 381*   BUN  --   --  13  --   --   --  19 20 14   CR  --   --  0.47*  --   --   --  0.81 0.90 1.18*   GFRESTIMATED  --   --  >90  --   --   --  >90 82 60*   LUDA  --   --  7.3*  --   --   --  7.8* 7.9* 8.7   MAG  --   --  1.9  --  2.2  --  1.9 2.1 3.1*   PHOS  --   --  3.4  --   --   --   --   --   --     < > = values in this interval not displayed.     Troponins:     INR  Recent Labs   Lab 01/16/22  0336 01/15/22  1444 01/15/22  0934   INR 1.42* 1.70* 1.66*     Liver panel  Recent Labs   Lab 01/16/22  0336 01/15/22  1428 01/15/22  0934   PROTTOTAL 5.3* 6.3* 6.4*   ALBUMIN 2.7* 3.2* 3.2*   BILITOTAL 0.2 0.2 0.3   ALKPHOS 30* 39* 37*   * 374* 247*   * 294* 233*       Imaging/procedure results:  US Carotid Bilateral  Narrative:  US CAROTID BILATERAL 1/15/2022 3:11 PM     History:  Cardiac Arrest     Comparison Study: None    Findings: Grayscale, color, and spectral ultrasound performed.    Right carotid peak velocities systolic/diastolic:  CCA mid:    77/16 cm/s  ICA proximal:  57/21 cm/s  ICA mid:    75/33 cm/s  ICA distal:     78/32cm/s  ICA/CCA:              1.02  ECA:      59 cm/s  vertebral:    20 cm/s     Left carotid peak velocities systolic/diastolic:  CCA mid:    89/13 cm/s  ICA proximal:  54/25 cm/s  ICA mid:    96/43 cm/s  ICA distal:    53/19 cm/s  ICA/CCA:             1.08  ECA:      52 cm/s  vertebral:                 47 cm/s  Impression: Impression:    1. Right side:        Degree of stenosis of the internal carotid artery: Normal.    2. Left side:         Degree of stenosis of the internal carotid artery: Normal.    Consensus Panel Gray-Scale and Doppler US Criteria for Diagnosis of  ICA Stenosis (Radiology 11/2003) additionally modified by Patsy et  al. in Journal of Vascular Surgery 1/2011, [phone])35-10.       Normal         ICA PSV < 140 cm/sec       Plaque Estimate None       ICA/CCA  PSV Ratio < 2.0       ICA EDV < 40 cm/sec       < 50%          ICA PSV < 140 cm/sec       Plaque Estimate < 50%       ICA/CCA  PSV Ratio < 2.0       ICA EDV < 40 cm/sec       50- 69%       ICA -230 cm/sec       Plaque Estimate > or = 50%       ICA/CCA PSV Ratio 2.0-4.0       ICA EDV  cm/sec         > or = 70%, less than near occlusion       ICA PSV > 230 cm/sec       Plaque Estimate > or = 50%       ICA/CCA Ratio > 4.0       ICA EDV > 100 cm/sec                                            Additional criteria from vascular surgery     > 80%       EDV > 120 cm/sec     I have personally reviewed the examination and initial interpretation  and I agree with the findings.    HILLARY SUERO         SYSTEM ID:  [id number]  XR Abdomen Port 1 View  Narrative: Portable abdomen 1 view    INDICATION: OG    COMPARISON: Chest radiograph at same time    FINDINGS: Contrast in bilateral renal collecting systems, distal  ureters and in the partially visualized urinary bladder from recent  contrast examination earlier today. No evidence of distended bowel. OG  tube tip and side hole both projected within the stomach.  Impression: IMPRESSION: OG  tube tip and sidehole projected within the stomach.  Contrast in both collecting systems and distal ureters, and urinary  bladder.    DARREL RODRIGUEZ MD         SYSTEM ID:  [id number]  XR Chest Port 1 View  Narrative: EXAMINATION:  XR CHEST PORT 1 VIEW 1/15/2022 2:40 PM.    COMPARISON: 1/15/2022.    HISTORY:  Check endotracheal tube placement. DO NOT log-roll patient.   Place film under patient using patient safety handling process.    FINDINGS: Critical AP view of the chest. Endotracheal tube tip  projects over the mid trachea. Gastric tube tip and sidehole project  over the stomach. The heart is not enlarged. Pulmonary vasculature is  distinct. No pleural effusion. Mild perihilar hazy opacities. No  pneumothorax. Contrast and bilateral renal collecting systems. Osseous  structures are stable.  Impression: IMPRESSION:   1. Endotracheal tube tip projects over the mid trachea.  2. Gastric tube tip and sidehole project over the stomach.  3. Hazy perihilar opacities, likely pulmonary edema.    I have personally reviewed the examination and initial interpretation  and I agree with the findings.    DARREL RODRIGUEZ MD         SYSTEM ID:  [id number]  XR Chest Port 1 View  Narrative: EXAM: XR CHEST PORTABLE 1 VIEW  LOCATION: [facility]  DATE/TIME: 01/15/2022, 9:52 AM    INDICATION: Cardiac arrest.  COMPARISON: None.  Impression: IMPRESSION:   1. ET tube is 2.7 cm proximal to the nava. Nasogastric tube tip at the proximal stomach.  2. No acute airspace disease or effusion. Normal cardiac silhouette.  CT Chest (PE) Abdomen Pelvis w Contrast  Narrative: EXAM: CT CHEST PE, ABDOMEN AND PELVIS WITH CONTRAST  LOCATION: [facility]  DATE/TIME: 01/15/2022, 10:21 AM    INDICATION: Cardiac arrest.  COMPARISON: None.  TECHNIQUE: CT chest pulmonary angiogram and routine CT abdomen pelvis with IV contrast. Arterial phase through the chest and venous phase through the abdomen and pelvis.  Multiplanar reformats and MIP reconstructions were performed. Dose reduction   techniques were used.   CONTRAST: 64 mL Isovue-370.    FINDINGS:  ANGIOGRAM CHEST: Positive large saddle pulmonary embolism. Thrombus burden leads to all lobes of both lungs. Thoracic aorta is negative for dissection. Dilated right cardiac chambers and flattening of the interventricular septum.     LUNGS AND PLEURA: No effusions. Motion artifact limits detailed assessment of the lungs. No acute pulmonary consolidation. Minor bibasilar atelectasis.    MEDIASTINUM/AXILLAE: ET tube identified, its tip measuring 1.4 cm proximal to the nava. Nasogastric tube in the stomach. No suspicious lymph node. Pectus excavatum.    CORONARY ARTERY CALCIFICATION: None.    HEPATOBILIARY: Heterogeneous perfusion of the liver. Periportal edema is noted throughout the liver. There is moderate edema of the gallbladder wall.    PANCREAS: Normal.    SPLEEN: Normal.    ADRENAL GLANDS: Normal.    KIDNEYS/BLADDER: Normal.    BOWEL: No obstruction. A few bowel loops mildly distended with fluid. Mild edema of the bowel.    LYMPH NODES: Normal.    VASCULATURE: No acute abdominal aortic abnormality. Left groin approach venous catheter extends superiorly and ends at the most superior aspect of the IVC. Left femoral arterial line in place. The left femoral artery does appear to be patent, though   there is surrounding wall thickening that is low-density in this area series 11 image 166 and on adjacent images.    PELVIC ORGANS: There is edema at the left inguinal subcutaneous fat.    MUSCULOSKELETAL: No large hematoma can be seen in the chest, abdomen, or pelvis. No acute or aggressive bony abnormality.  Impression: IMPRESSION:  1.  Large saddle pulmonary embolism. Dilated right cardiac chambers worrisome for cardiac strain.  2.  Left femoral arterial line is noted. While this vessel appears patent, at the entrance site of the arterial femoral line, there is surrounding  low-density that could represent a degree of hematoma within the femoral artery wall. Consider further   assessment with vascular ultrasound.  3.  ET tube tip is just 1.4 cm proximal to the nava. Left groin approach central venous line tip is at the most superior aspect of the IVC.  4.  Moderate wall thickening of the gallbladder that appears edematous. Correlate with any gallbladder symptoms.    [Critical Result: Saddle pulmonary embolism. Findings worrisome for right cardiac strain.]    Finding was identified on 01/15/2022 at 10:56 AM.     Dr. Bonilla was contacted by me on 01/15/2022 at 11:05 AM and verbalized understanding of the critical result.   IR Pulmonary Angiogram Bilateral  Narrative: Procedures 1/15/2022:  1. Ultrasound guidance for venous access  2. Bilateral pulmonary angiography  3. Bilateral mechanical thrombectomy    History: Massive pulmonary embolus with cardiac arrest with return of  spontaneous circulation. Patient transferred from [facility] emergency  department. Patient had a ventral hernia repair 2 days ago. Today, she  had a syncopal event with a cardiac arrest at home. Patient found to  have saddle pulmonary embolus. Currently, her blood pressures remain  borderline on norepinephrine infusion. She is on heparin IV.    Comparison: CT obtained the same day.    Operators: Milo and Dahlia MENDEZ, Dr. Donavan Pedraza, performed the entire procedure with the  assistance of Dr. Villagomez.    Medications:   Fentanyl and propofol, patient arrives intubated. Moderate sedation  administered by the IR nurse at the supervision of the attending.  Vital signs and oxygenation continuously monitored. The patient  remained stable throughout the procedure.    Sedation time: 90 minutes    Fluoroscopy time: 15.8 minutes    Contrast: 80 cc Visipaque 320    Findings/procedure:    Prior to the procedure, both verbal and written informed consent  obtained from the patient's .    The patient placed supine.  The right groin was prepped and draped.  Timeout performed.    Ultrasound revealed a patent and fully compressible right common  femoral vein for access. Under direct ultrasound guidance, the right  common femoral vein was accessed with a micropuncture needle, image of  the needle in the vein stored. 7 Cymro sheath placed. The main  pulmonary artery was catheterized using a double angle pigtail.  Bilateral pulmonary angiography performed revealing predominantly  central multisegmental filling defects.    Main pulmonary artery pressure was 50/26 with a mean a 35 mmHg.    The right main pulmonary artery catheterized. A 24 Cymro [place] dry  seal sheath placed. T24 aspiration guide catheter was advanced into  the right main pulmonary artery. Suction thrombectomy performed.  Multiple emboli were successfully aspirated. Angiography performed  revealing no substantial residual segment of filling defects.    Attention was then placed to the left side. The left pulmonary artery  was catheterized using a under centimeter Vert catheter. Multiple  aspirations were performed with the 24 Cymro aspiration guide  catheter. Only a few small fragments emboli could be aspirated.    Upon completion, repeat pressures were 35/15 with a mean of 26 mmHg.    Given the significant improvement in Main pulmonary artery pressures  and improvement in patient's invasive systolic blood pressure, and  discontinuing the IV pressors, no further intervention performed.    The sheath was removed and a pursestring stitch was placed.  Impression: Impression:  Bilateral pulmonary emboli with elevated main pulmonary artery  pressures of 50/26 mmHg, mean of 35 mmHg prethrombectomy. Successful  mechanical thrombectomy with improvement in invasive systemic blood  pressure and reduction in main pulmonary artery pressure to 35/15  mmHg, mean of 26 mmHg.    Plan:   Continue systemic anticoagulation with heparin IV. Patient to be  transferred to the ICU for  further treatment and monitoring. We will  plan to remove the right groin pursestring suture tomorrow or the  subsequent day.    [employer]         SYSTEM ID:  [id number]  Head CT w/o contrast  Narrative: EXAM: CT HEAD W/O CONTRAST  LOCATION: [facility]  DATE/TIME: 1/15/2022 10:21 AM    INDICATION: Cardiac arrest  COMPARISON: None.  TECHNIQUE: Routine CT Head without IV contrast. Multiplanar reformats. Dose reduction techniques were used.    FINDINGS:  INTRACRANIAL CONTENTS: No intracranial hemorrhage, extraaxial collection, or mass effect.  No CT evidence of acute infarct. Normal parenchymal attenuation. Normal ventricles and sulci.     VISUALIZED ORBITS/SINUSES/MASTOIDS: No intraorbital abnormality. No paranasal sinus mucosal disease. No middle ear or mastoid effusion.    BONES/SOFT TISSUES: No acute abnormality.  Impression: IMPRESSION:  1.  Normal head CT.

## 2022-01-16 NOTE — PLAN OF CARE
Major Shift Events: no acute events overnight. Remains intubated, sedated, paralyzed, cooled to 34C. Given 2L LR for hypotension, low UOP with poor response. MAP remains above 60.   Plan: monitor cardiac, neuro status. Plan to rewarm at 1500 today.   For vital signs and complete assessments, please see documentation flowsheets.

## 2022-01-16 NOTE — PROGRESS NOTES
-Start rewarming 0.25C/hr at 1530. (should be at goal temp at 0330).   Per Dr. Briscoe:  -Keep thermoguard on to maintain normothermia once re-warmed  -At 36C, cut Versed gtt in half  -When pt reaches 37C. Stop Versed.     CVP 10-12, gave 1L LR total  UO 15-45ml/hr    Hep 10A therapeutic x2. Check level in AM    ECHO done this morning.    -BIS started climbing to 60-70. Stopped Nimbex @1716, per Dr. Briscoe and assess shivering. Can increase Fentanyl to 200 and Versed to 10 if need be     Tao, , at bedside most of the day and participated in rounds with CSI team.

## 2022-01-17 NOTE — PROGRESS NOTES
Saw patient to remove right groin Woggle stitch. The dressing was removed and the puncture site was clean. Woggle tension was relieved and there was no evidence of bleeding or oozing. The suture was removed and a clean bandage was placed over the right groin puncture site. Call or page IR with any questions.    Fam Villagomez DO on 1/17/2022 at 11:38 AM  Diagnostic Radiology Resident  Call Pager for After Hours: [phone]

## 2022-01-17 NOTE — PROGRESS NOTES
Cardiology ICU Progress Note    Brief HPI:  Kimmy Gerardo is a 41 year old female with PMHx of ventral hernia s/p repair who presents after out of hospital cardiac arrest with ROSC 2/2 PE.       Per report, patient underwent recent ventral hernia 1/13/22 repair at [facility].  1/15 am,  noted patient to be pale and clammy after using bathroom and then became unresponsive. He started CPR and EMS was called, arriving shortly thereafter. Initial rhythm not shockable. She did achieve ROSC but did arrest two additional times en route to hospital and had ongoing CPR on arrival to [facility] ED. She was intubated and taken to CT scanner which revealed large saddle PE with concern for right heart strain  She was transferred to [facility] where she was taken urgently to IR suite for pulmonary angiogram with thrombectomy. Pre thrombectomy PA pressures  50/26 (35) and post thrombectomy 35/15 (26). She was resumed on IV Heparin and transferred to Cardiology ICU for continued cares.      Subjective and Interval: No acute events overnight. Started rewarming 1/16 @ 1530 by 0.25 degree/hr, has been rewarmed since 0100 1/17. Echo performed yesterday which revealed LVEF 65%, flattened septum/right ventricular pressure/volume elevated, mild RV  Dilation, global right ventricular function moderately reduced. Also discontinued vanc    Assessment and plan by system:   Today's changes:  Pull dani/thermogard  PST and extubate  Reduce RR on vent given hypocarbia  Wean fent, keep Precedex on as needed  Can add PO oxy PRN if needed to wean off Fentanyl  IR to remove Woggle suture  If UOP drops/hypotension, plan for 500 ml fluid bolus  If not extubated next couple days will need tube feeds initiation/nutrition  Start bowel regimen     Assessment and plan by system:   Neurology   # Concern for anoxic brain injury    -- Intubated, sedated. Cooled to 34 degrees via left fem thermogard  -- Continue versed, fentanyl, Prop for  sedation with a RASS goal -4 to -5. Did require Cis for paralytic/shivering overnight.  -- CTH also performed which revealed no acute intracranial pathology.  -- Neuro-crit consulted and appreciate recommendations.   -- vEEG without seizures  -- Palliative care consulted.      Sedation/opioids/paralytics:  - Remains on Fentanyl 200 mcg/hour, Propofol off, Versed at 4 mg/hr, Precedex @ 0.2 mcg/kg/hr    Cardiovascular  # Out of hospital cardiac arrest, non shockable, with ROSC 2/2 PE  # Right heart strain 2/2 PE, as below (RV mod reduced)  # Tachycardia, Sinus- resolved  TTE:  Echo performed yesterday which revealed LVEF 65%, flattened septum/right ventricular pressure/volume elevated, mild RV  Dilation, global right ventricular function moderately reduced.  EKG: Sinus, TWI lead III, AVf, V1-V4  CVP 8-12 of left fem CVC  -- Telemetry monitoring  -- Troponin initial 57-->peaked at 3548--> downtrending     Pressors/antiarrythmics/inotropes:  Norepi 0.09 mxg/kg/mn    Pulmonary  # Massive saddle PE s/p mechanical thrombectomy  # Acute hypoxemic respiratory failure requiring intubation  # Probable aspiration pneumonia  Pre thrombectomy PA pressures  50/26 (35) and post thrombectomy 35/15 (26).  Vent Settings: CMV/14/480/5/30%- decrease tv or RR if riding vent now  AB.43/31/84/21- since decreased TV  CXR without significant opacities, ETT slightly deep, will retract 1.5 cm, other lines in stable position  CAP CT with  Gallbladder edema and saddle PE  -- Wean vent as able  -- PST and possible extubation  -- Daily CXR  -- Serial ABGs   -- Consider scheduled duonebs if signs of lung dz, currently PRN    -- Heparin for PE as below     Infectious Disease  # Aspiration Pneumonia- in the setting of arrest.  CXR/CAP as above.   # Leukocytosis in setting of arrest/PE  WBC now normal, 8.5 (8) currently being cooled so no fevers  -- Monitor for signs of infection  -- Blood cultures/resp cultures pending  Cultures thus far:          "       - MRSA negative  Antibiotics               - Vancomycin -1/15- 1/16               - Cefepime -1/15- present      Renal, Electrolytes  # Hypoalbuminemia   Lactic acidosis resolved, now 0.6  -- Cr 0.59  BUN 8  I/O's:   750 UOP yest, 137 more since midnight, net + 3.2 L yest -- Monitor urine output  -- Avoid nephrotoxins  -- Fl bolus if UOP drops or hypotensive.         Gastrointestinal, Nutrition  # Shock liver 2/2 cardiac arrest  # Gallbladder edema   No known medical hx.   CAP CT as above  -- LFTs improving;  (204) AST 74 (187) Tbili WNL  -- NPO; Nutrition consult next day or two if not extubated for tube feeds initiation  -- Bowel regimen -start today  -- GI Prophylaxis: PPI; Protonix 40 mg daily     Hematology  # Thrombocytopenia  # Saddle PE  # Anemia, acute blood loss and critical illness  Hgb slight drop 9.6 (10.7)  Plt 94 (79)  93 on admission) No e/o bleeding.   -- Receiving heparin , high intensity for PE, start OAC coming days once warm  -- Transfuse for Hgb < 8  -- DVT PPX: Heparin as above     Endocrinology  # Hyperglycemia in the setting critical illness  -- No known medical history.   -- insulin gtt as needed, not currently needed  -- Hgb a1c 5.4%     Integumentary:  - No skin issues     Patient seen and discussed with staff physician.     ANNE MARIE Spann, CNP  [facility]  Interventional Cardiology-CSI Service  Pager [phone]      Objective:  Most recent vital signs:  BP (!) 127/116   Pulse 80   Temp 98.2  F (36.8  C)   Resp 14   Ht 1.676 m (5' 6\")   Wt 51.2 kg (112 lb 14 oz)   SpO2 100%   BMI 18.22 kg/m    Temp:  [91.6  F (33.1  C)-99.1  F (37.3  C)] 98.2  F (36.8  C)  Pulse:  [] 80  Resp:  [12-16] 14  MAP:  [58 mmHg-85 mmHg] 69 mmHg  Arterial Line BP: ()/(42-62) 104/49  FiO2 (%):  [30 %] 30 %  SpO2:  [93 %-100 %] 100 %  Wt Readings from Last 2 Encounters:   01/17/22 51.2 kg (112 lb 14 oz)   01/15/22 60 kg (132 lb 4.4 oz) "       Intake/Output Summary (Last 24 hours) at 1/17/2022 0501  Last data filed at 1/17/2022 0500  Gross per 24 hour   Intake 2196.86 ml   Output 918 ml   Net 1278.86 ml         Physical exam:  General: In bed, in NAD  HEENT: PERRL, no scleral icterus or injection  CARDIAC: RRR, no m/r/g appreciated. Peripheral pulses dopplered  RESP: Mechanical ventilation; CTAB, no wheezes, rhonchi or crackles appreciated.  GI: soft, BS hypoactive  : Ramsey  EXTREMITIES: NO LE edema, pulses 2+. Femoral access site w/o bleeding, dressing c/d/i.    SKIN: No acute lesions appreciated  NEURO: Intubated and sedated    Labs (Past three days):  CBC  Recent Labs   Lab 01/17/22  0340 01/17/22  0052 01/16/22  1459 01/16/22  0337   WBC 8.5 8.2 5.6 8.1   RBC 3.05* 3.38* 3.15* 3.38*   HGB 9.6* 10.6* 9.9* 10.7*   HCT 28.0* 31.6* 29.3* 31.9*   MCV 92 94 93 94   MCH 31.5 31.4 31.4 31.7   MCHC 34.3 33.5 33.8 33.5   RDW 12.6 12.7 13.0 12.8   PLT 94* 80* 62* 79*     BMP  Recent Labs   Lab 01/17/22  0343 01/17/22  0340 01/17/22  0338 01/17/22  0304 01/16/22  2248 01/16/22  2159 01/16/22  1950 01/16/22  1459 01/16/22  0337 01/16/22  0336 01/15/22  2212 01/15/22  2208 01/15/22  1431 01/15/22  1428   NA  --  138  --   --   --   --   --  139  --  140  --   --   --  140   POTASSIUM  --  3.6  --   --   --  4.4  --  3.7  --  3.6  --  4.0  --  4.6   CHLORIDE  --  109  --   --   --   --   --  111*  --  113*  --   --   --  114*   CO2  --  19*  --   --   --   --   --  22  --  22  --   --   --  20   ANIONGAP  --  10  --   --   --   --   --  6  --  5  --   --   --  6   GLC 87 86 85 72   < > 88   < > 94  91   < > 95   < >  --    < > 122*   BUN  --  8  --   --   --   --   --  8  --  13  --   --   --  19   CR  --  0.59  --   --   --   --   --  0.46*  --  0.47*  --   --   --  0.81   GFRESTIMATED  --  >90  --   --   --   --   --  >90  --  >90  --   --   --  >90   LUDA  --  8.0*  --   --   --   --   --  7.2*  --  7.3*  --   --   --  7.8*   MAG  --  1.7  --   --   --    --   --  2.3  --  1.9  --  2.2  --  1.9   PHOS  --  2.6  --   --   --   --   --   --   --  3.4  --   --   --   --     < > = values in this interval not displayed.     Troponins:     INR  Recent Labs   Lab 22  0340 22  0052 22  0336 01/15/22  1444   INR 1.64* 1.53* 1.42* 1.70*     Liver panel  Recent Labs   Lab 22  0340 22  0027 22  1459 22  0336   PROTTOTAL 5.2* 5.9* 5.0* 5.3*   ALBUMIN 2.2* 2.6* 2.3* 2.7*   BILITOTAL 0.3 0.3 0.2 0.2   ALKPHOS 35* 41 31* 30*   AST 74* 91* 112* 187*   * 161* 165* 204*       Imaging/procedure results:  Echo Complete  [id number]  [id number]  [id number]  [id number]^LUCINDA^KEYSHAWN^RAMILA     [facility]  Echocardiography Laboratory  [address] [place] [id number]     Name: ISRRAEL NDIAYE  MRN: [id number]  : 1980  Study Date: 2022 09:14 AM  Age: 41 yrs  Gender: Female  Patient [facility]: [facility]  Reason For Study: Shock  Ordering Physician: KEYSHAWN JANE  Referring Physician: ANAMARIA GUZMÁN  Performed By: Dennys Hudson     BSA: 1.7 m2  Height: 66 in  Weight: 132 lb  HR: 84  BP: 104/54 mmHg  ______________________________________________________________________________  Procedure  Complete Portable Echo Adult. Echocardiogram with two-dimensional, color and  spectral Doppler performed.  ______________________________________________________________________________  Interpretation Summary  Left ventricular function is normal.The ejection fraction is 60-65%.  Flattened septum is consistent with right ventricular pressure and volume  overload.  Mild right ventricular dilation is present. Global right ventricular function  is moderately reduced.  Mild tricuspid insufficiency is present.  Estimated pulmonary artery systolic pressure is 26 mmHg plus right atrial  pressure.  Patient is on the ventilator, mean RA pressure likely low given the normal IVC  size with respirophasic variability.  No  pericardial effusion is present.  ______________________________________________________________________________  Left Ventricle  Left ventricular size is normal. Left ventricular wall thickness is normal.  Left ventricular function is normal.The ejection fraction is 60-65%. Flattened  septum is consistent with right ventricular pressure and volume overload.     Right Ventricle  Mild right ventricular dilation is present. Global right ventricular function  is moderately reduced.     Atria  Mild biatrial enlargement is present.     Mitral Valve  The mitral valve is normal.     Aortic Valve  Aortic valve is normal in structure and function.     Tricuspid Valve  The tricuspid valve is normal. Mild tricuspid insufficiency is present.  Estimated pulmonary artery systolic pressure is 26 mmHg plus right atrial  pressure.     Pulmonic Valve  The pulmonic valve is normal.     Vessels  The aorta root is normal. Patient is on the ventilator, mean RA pressure  likely low given the normal IVC size with respirophasic variability.     Pericardium  No pericardial effusion is present.     Compared to Previous Study  Previous study not available for comparison.  ______________________________________________________________________________  MMode/2D Measurements & Calculations  IVSd: 0.92 cm     LVIDd: 3.6 cm  LVIDs: 2.2 cm  LVPWd: 1.0 cm  FS: 39.6 %  LV mass(C)d: 101.7 grams  LV mass(C)dI: 60.6 grams/m2  Ao root diam: 2.9 cm  asc Aorta Diam: 2.8 cm  LVOT diam: 1.9 cm  LVOT area: 2.8 cm2  RVOT diam: 1.7 cm  LA Volume (BP): 55.7 ml  LA Volume Index (BP): 33.2 ml/m2  RWT: 0.56     Doppler Measurements & Calculations  MV E max scottie: 50.4 cm/sec  PA acc time: 0.10 sec  TR max scottie: 253.0 cm/sec  TR max P.7 mmHg  E/E' av.5  Lateral E/e': 4.1  Medial E/e': 6.9     ______________________________________________________________________________  Report approved by: Geena Boudreaux 2022 11:25 AM        EEG Video 12-26 hr  Unmonitored  EEG Video 12-26 hr Unmonitored Result    VIDEO EEG DATE: 1/15/22  VIDEO EEG [id number]  VIDEO EEG DAY#: 1  VIDEO EEG SOURCE FILE DURATION: 12 hours 54 minutes    PATIENT INFORMATION: Kimmy Gerardo is a 41 year old female with PMHx   of PTSD and hernia s/p repair who presents after out of hospital cardiac   arrest.  EEG is being done to evaluate for seizures.     MEDICATIONS:   Fentanyl 25 mcg @ 10:57   Versed 2 mg @ 09:50   Propofol 20 mg @ 09:42    gtt:   Cisatricurium 3 mcg/kg/min started @ 17:20   Fentanyl  mcg/hr started @ 14:30   Versed 1-8 mg/hr    Propofol 10-30 mcg/kg/min   These medications and doses were derived from the medical record at the   time of this procedure.    TECHNICAL SUMMARY: EEG was recorded from 25 scalp electrodes placed   according to the 10-20 international system. Additional electrodes were   used for referencing, EKG, and to record from other cerebral regions as   appropriate. Video was continuously recorded and was reviewed for clinical   correlation. Electrodes were attached and both video and EEG were   monitored and annotated by qualified EEG technologists. Video-EEG was   reviewed and report generated by qualified physician.    BACKGROUND ACTIVITY: There was a burst suppression pattern with bursts of   polymorphic generalized theta delta activities with ~ 10 to 40  V   amplitudes with superimposed lower amplitude faster activities and   interburst intervals of generalized attenuation of the background.  At   times theta-delta activities were sharply contoured. At the beginning of   the recording, bursts of activities typically lasated ~2 to 4 seconds and   interburst intervals lasted ~2 to 10 seconds. After midnight duration of   the bursts increased and at times they lasted ~10-20 seconds, and the   duration of interburst intervals decreased to 1 to 3 seconds    INTERICTAL EPILEPTIFORM DISCHARGES: No epileptiform discharges were   present    ICTAL: No  clinical events or electrographic seizures were recorded. Video   was reviewed intermittently by EEG technologist and physician for clinical   seizures.     IMPRESSION OF VIDEO EEG DAY # 1: This video EEG is abnormal throughout. At   the beginning of the recording, study is limited due to significant   myogenic artifact. At approximately 1730 myogenic artifact decreased.   There was a burst suppression pattern throughout the recording. At the   beginning of the recording the degree of suppression was approximately 60   to 70% and after midnight it decreased to 15 to 20%. No epileptiform   discharges or electrographic seizures were recorded. Findings are   consistent with severe diffuse nonspecific encephalopathy. Sedative   medications are contributing to this finding as well. Clinical correlation   advised.    Tracie Warren MD  EPILEPSY STAFF   XR Chest Port 1 View  Narrative: EXAM: XR CHEST PORT 1 VIEW  1/16/2022 5:14 AM      HISTORY: Check endotracheal tube placement. DO NOT log-roll patient.   Place film under patient using patient safety handling process.    COMPARISON: 1/15/2022    FINDINGS: Single view of the chest. ET tube tip in the mid thoracic  trachea. Enteric tube tip and sidehole projected over the stomach.  Esophageal temperature probe distal tip projects over the distal  esophagus. Trachea is midline. Cardiomediastinal silhouette is similar  to prior. Slightly increased retrocardiac opacity. No large pleural  effusions. No appreciable pneumothorax.  Impression: IMPRESSION:  1. ET tube tip in the mid thoracic trachea.  2. Retrocardiac opacities likely representing atelectasis.    I have personally reviewed the examination and initial interpretation  and I agree with the findings.    FRANK DAWSON MD         SYSTEM ID:  [id number]

## 2022-01-17 NOTE — PLAN OF CARE
Writer assumed care of patient from [id number].    Major Shift Events:  Patient rewarmed at 0.25C/hr. Versed dose reduced by half (to 4mg/hr) when reached 36C. Reached goal temp of 37C @ 0100. Patient intermittently shivering, HR elevated (low 100s), and breathing over vent. Per Cards Fellow, leave Versed on and reasses in AM. Precedex ordered with goal of keeping patient comfortable and not shivering overnight. Remains on Precedex, Fentanyl, and Versed gtts. Propofol titrated off. RASS -5, unresponsive. Did appear to slightly move extremities before starting Precedex though not purposefully. At this time, patient does not cough or gag w/ suctioning. Since starting Precedex, no longer shivering and HR in 80s. Also now synchronous with vent. BIS in 40s-60s (was in 80s prior to starting Precedex). Levophed dose 0 to 0.09 to maintain MAP > 65. Vent settings changed per ABG results.  Bloody drainage from right nostril, mouth, and left groin CVC site. OG to LIS. Ramsey with marginal to minimal UOP- Cards fellow aware. Heparin gtt titrated per HepXa. Magnesium replaced this AM and phosphorus replacement initiated.     Plan: Reassess plan for weaning sedation this AM. Primary team to determine intervention for low UOP. Thermoguard to maintain temp @ 37C.    For vital signs and complete assessments, please see documentation flowsheets.     Kathy Reyes RN on 1/17/2022 at 6:09 AM    Addendum: Versed gtt stopped ar 0627 per Dr. Ulrich w/ NeuroCrit

## 2022-01-17 NOTE — PROGRESS NOTES
[facility], Procedure Note          Extubation:       Kimmy Gerardo  MRN# [id number]   January 17, 2022         Patient extubated at: January 17, 2022   Supplemental Oxygen: Via oxymask at 6 liters per minute   Cough: The cough is weak and good   Secretion Mode: Able to clear  PRN suction with assistance   Secretion Amount: Moderate amount, thick and white / yellow in color   Respiratory Exam:: Breath sounds: good aeration     Location: all lobes and bilaterally   Skin Exam:: Patient color: natural   Patient Status: Currently appears comfortable, arouses to verbal and arouses to touch   Arterial Blood Gasses: pH Arterial (no units)   Date Value   01/17/2022 7.47 (H)     pO2 Arterial (mm Hg)   Date Value   01/17/2022 77 (L)     pCO2 Arterial (mm Hg)   Date Value   01/17/2022 28 (L)     Bicarbonate Arterial (mmol/L)   Date Value   01/17/2022 21      Cuff Leak: Present     Recorded by Fam Garcia, RRT

## 2022-01-18 PROBLEM — K52.9 CHRONIC DIARRHEA OF UNKNOWN ORIGIN: Status: ACTIVE | Noted: 2022-01-01

## 2022-01-18 PROBLEM — I46.9 CARDIAC ARREST (H): Status: ACTIVE | Noted: 2022-01-01

## 2022-01-18 PROBLEM — K52.9 CHRONIC DIARRHEA OF UNKNOWN ORIGIN: Chronic | Status: ACTIVE | Noted: 2022-01-01

## 2022-01-18 PROBLEM — F43.10 POSTTRAUMATIC STRESS DISORDER: Chronic | Status: ACTIVE | Noted: 2022-01-01

## 2022-01-18 PROBLEM — F43.10 POSTTRAUMATIC STRESS DISORDER: Status: ACTIVE | Noted: 2022-01-01

## 2022-01-18 NOTE — PLAN OF CARE
SLP: SLP orders received.  SLP attempted assessment, pt with significant lethargy and unable to wake despite use of sternal rubbing by SLP.  Not appropriate for PO intake. SLP cancel, will complete swallow assessment 1/19/22 or as appropriate.

## 2022-01-18 NOTE — PROGRESS NOTES
[facility]   Critical Care Cardiology - Progress Note    Kimmy Gerardo MRN: [id number]  Age: 41 year old, : 1980  Date: 2022    Assessment and Plan:  Kimmy Gerardo is a 41 year old female with PMHx of ventral hernia s/p repair who presents after out of hospital cardiac arrest with ROSC 2/2 PE.       Per report, patient underwent recent ventral hernia 22 repair at [facility].  1/15 am,  noted patient to be pale and clammy after using bathroom and then became unresponsive. He started CPR and EMS was called, arriving shortly thereafter. Initial rhythm not shockable. She did achieve ROSC but did arrest two additional times en route to hospital and had ongoing CPR on arrival to [facility] ED. She was intubated and taken to CT scanner which revealed large saddle PE with concern for right heart strain  She was transferred to [facility] where she was taken urgently to IR suite for pulmonary angiogram with thrombectomy. Pre thrombectomy PA pressures  50/26 (35) and post thrombectomy 35/15 (26). She was resumed on IV Heparin and transferred to Cardiology ICU for continued cares.      Today's Plan:  - start scheduled melatonin, 10mg at night  - start Seroquel 50mg at night; EKG to follow tomorrow AM  - start 5mg zyprexa Q6 hours for now x2 doses  - load with valproic acid per neuocrit  - EKG now given QTc 506ms while cooled.  - give 500cc NS this AM  - speech, PT, OT consults  - remove arterial line, central line  - NJ placement    Neurology   # Anoxic brain injury   # Delirium   S/p therapeutic cooling. CTH revealed no acute intracranial pathology. vEEG without seizures  - continue precedex ggt   - valproic acid load per neurocrit  - start scheduled melatonin, 10mg at night  - start Seroquel 50mg at night; EKG to follow tomorrow AM  - start 5mg zyprexa Q6 hours for now x2 doses. Will switch to seroquel  - Neuro-crit consulted and appreciate recommendations.    - Palliative care consulted and appreciate recommendations.     Cardiovascular  # OOHCA with ROSC 2/2 PE  # Moderate RV Dysfunction secondary to PE  # Tachycardia, Sinus- resolved  History as above. Echo 22 revealed LVEF 65%, flattened septum/right ventricular pressure/volume elevated, mild RV  Dilation, global right ventricular function moderately reduced. Troponin initial 57-->peaked at 3548--> down trending  - Telemetry monitoring  - PRN hydralazine for HTN . Goal MAP <160     Pulmonary  # Massive saddle PE s/p mechanical thrombectomy  # Probable aspiration pneumonia  # Acute hypoxemic respiratory failure requiring intubation, resolved  Pre thrombectomy PA pressures  50/26 (35) and post thrombectomy 35/15 (26). Extubated 22.  AB.45/33/81/23  - Heparin for PE as below     Infectious Disease  # Aspiration Pneumonia  # Leukocytosis in setting of arrest/PE  WBC 8.5  - reculture this morning  Cultures thus far:   - MRSA negative  Antibiotics   - Vancomycin 1/15-    - Cefepime 1/15-    - Rocephin  - present     Renal, Electrolytes  # Hypoalbuminemia   Lactic acidosis resolved, now 0.6. Creatinine 0.56. Making urine  - Avoid nephrotoxins  - give small fluid bolus this AM        Gastrointestinal, Nutrition  # Shock liver 2/2 cardiac arrest  # Gallbladder edema   No known medical hx. LFTs improving  - speech consult  - Bowel regimen; miralax, senna  - stop protonix     Hematology  # Thrombocytopenia  # Saddle PE  # Anemia, acute blood loss and critical illness  Hgb 8.4, drifting down. Plt 98  - Receiving heparin, high intensity for PE, start OAC coming days once warm  - Transfuse for Hgb < 8  - DVT PPX: Heparin as above     Endocrinology  # Hyperglycemia in the setting critical illness  No known medical history. Hgb a1c 5.4%     Integumentary:  No skin issues    Pertinent Lines  Arterial line - remove today  Femoral central line - remove today    ICU Cares:  Daily CXR: N/A,  extubated  Fluids/Feeds: speech consult today  DVT Prophylaxis: heparin ggt  GI Prophylaxis: N/A, extubated  Bowel Regimen: senna, miralax  Anticipated Floor Transfer: later today    Family Update: , update by me    Patient seen and discussed with Dr. Dede Kaiser.  Assessment and plan as above.    Yamel Baum PA-C  Critical Care Cardiology  Pager: [phone]      Interval History:  Delirious overnight.  No other acute events.  Did not sleep.      Objective Findings:  Temp:  [97.3  F (36.3  C)-101.5  F (38.6  C)] 100.2  F (37.9  C)  Pulse:  [] 122  Resp:  [12-26] 26  MAP:  [69 mmHg-279 mmHg] 103 mmHg  Arterial Line BP: (104-291)/() 143/75  FiO2 (%):  [30 %] 30 %  SpO2:  [94 %-100 %] 97 %    I/O:  Intake/Output Summary (Last 24 hours) at 1/18/2022 0810  Last data filed at 1/18/2022 0700  Gross per 24 hour   Intake 1223.52 ml   Output 1177 ml   Net 46.52 ml       Physical Exam:  GEN: resting comfortably in bed  HEENT: no cranial trauma  Pulm: seemingly CTAB  Cardiac: regular rate/rhythm. No murmur, rub, gallop  GI: soft, non distended  Neuro: moves all extremities symmetrically  Integument: no appreciable skin breakdown  Vascular: LE warm, well perfused      Medications:    ceFEPIme (MAXIPIME) IV  2 g Intravenous Q12H     pantoprazole (PROTONIX) IV  40 mg Intravenous Daily     polyethylene glycol  17 g Oral Daily     senna-docusate  1 tablet Oral At Bedtime       - MEDICATION INSTRUCTIONS -       dexmedetomidine 1.2 mcg/kg/hr (01/18/22 0700)     heparin 1,000 Units/hr (01/18/22 0700)     norepinephrine Stopped (01/17/22 0900)         Labs:   CMP  Recent Labs   Lab 01/18/22  0345 01/18/22  0339 01/18/22  0024 01/17/22  2141 01/17/22  1547 01/17/22  1546 01/17/22  0343 01/17/22  0340 01/17/22  0029 01/17/22  0027 01/16/22  2248 01/16/22  2159 01/16/22  1950 01/16/22  1459 01/16/22  0337 01/16/22  0336   NA  --  137  --   --   --  135  --  138  --   --   --   --   --  139  --  140    POTASSIUM  --  3.8  --   --   --  3.8  --  3.6  --   --   --  4.4  --  3.7  --  3.6   CHLORIDE  --  107  --   --   --  106  --  109  --   --   --   --   --  111*  --  113*   CO2  --  23  --   --   --  20  --  19*  --   --   --   --   --  22  --  22   ANIONGAP  --  7  --   --   --  9  --  10  --   --   --   --   --  6  --  5   * 134*  116* 141* 121*   < > 125*   < > 86   < >  --    < > 88   < > 94  91   < > 95   BUN  --  7  --   --   --  8  --  8  --   --   --   --   --  8  --  13   CR  --  0.56  --   --   --  0.60  --  0.59  --   --   --   --   --  0.46*  --  0.47*   GFRESTIMATED  --  >90  --   --   --  >90  --  >90  --   --   --   --   --  >90  --  >90   LUDA  --  8.4*  --   --   --  8.0*  --  8.0*  --   --   --   --   --  7.2*  --  7.3*   MAG  --  2.4*  --  1.6  --  1.7  --  1.7  --   --   --   --   --  2.3  --  1.9   PHOS  --  2.4*  --   --   --   --   --  2.6  --   --   --   --   --   --   --  3.4   PROTTOTAL  --  5.9*  --   --   --  6.0*  --  5.2*  --  5.9*  --   --   --  5.0*  --  5.3*   ALBUMIN  --  2.4*  --   --   --  2.6*  --  2.2*  --  2.6*  --   --   --  2.3*  --  2.7*   BILITOTAL  --  0.4  --   --   --  0.3  --  0.3  --  0.3  --   --   --  0.2  --  0.2   ALKPHOS  --  46  --   --   --  46  --  35*  --  41  --   --   --  31*  --  30*   AST  --  113*  --   --   --  112*  --  74*  --  91*  --   --   --  112*  --  187*   ALT  --  107*  --   --   --  130*  --  132*  --  161*  --   --   --  165*  --  204*    < > = values in this interval not displayed.     CBC  Recent Labs   Lab 01/18/22 0339 01/17/22  1546 01/17/22  0340 01/17/22  0052   WBC 8.5 11.4* 8.5 8.2   RBC 2.68* 3.01* 3.05* 3.38*   HGB 8.4* 9.5* 9.6* 10.6*   HCT 24.4* 27.6* 28.0* 31.6*   MCV 91 92 92 94   MCH 31.3 31.6 31.5 31.4   MCHC 34.4 34.4 34.3 33.5   RDW 12.6 12.5 12.6 12.7   PLT 98* 98* 94* 80*     Arterial Blood Gas  Recent Labs   Lab 01/18/22 0339 01/17/22  2141 01/17/22  1657 01/17/22  1547   PH 7.45 7.44 7.43 7.47*   PCO2 33*  34* 31* 28*   PO2 81 107* 125* 77*   HCO3 23 23 21 21   O2PER 1 2 4 30         Pertinent Imaging Studies:  Echo 1/16/2022:   Left ventricular function is normal.The ejection fraction is 60-65%.  Flattened septum is consistent with right ventricular pressure and volume overload.  Mild right ventricular dilation is present. Global right ventricular function is moderately reduced.  Mild tricuspid insufficiency is present.  Estimated pulmonary artery systolic pressure is 26 mmHg plus right atrial pressure.  Patient is on the ventilator, mean RA pressure likely low given the normal IVC size with respirophasic variability.  No pericardial effusion is present.      Yamel Baum PA-C  Critical Care Cardiology  Pager: [phone]

## 2022-01-18 NOTE — PLAN OF CARE
Major Shift Events:  Inconsistently follows commands. Precedex at max rate, still gets agitated and tries to sit up and get OOB. PRN IV Benadryl given 2x for agitation. Speaking incoherently, confused. Becomes tachycardic and hypertensive when agitated. Given PRN Hydralazine 1x for SBP > 140 while asleep/calm. Frequent loose cough. On 1-2L NC. NPO, needs speech eval. Too lethargic for bedside swallow eval. LBM was PTA. Ramsey patent, oliguric at start of shift, now adequate UOP. Magnesium and phosphorus replaced, due for rechecks tomorrow. Heparin gtt titrated per HepXa, recheck @ 1000.     Plan: Wean sedation as able. Evaluate need for left groin line. Speech eval.     For vital signs and complete assessments, please see documentation flowsheets.     Kathy Reyes RN on 1/18/2022 at 6:50 AM

## 2022-01-18 NOTE — PLAN OF CARE
"Sedation weaned off today, Pt Pressure suppoerted and was extubated around 1600, pt on 3 L of Face mask w/ sats in high 90's. Pt's  at bedside w/ pt very supportive. Pt alert to self,  confused about date and situation. Pt stated to  \"I told you I never wanted to go to the hospital\" and is scooting legs trying to get at of bed. Pt redirected to situation and reassured. Precedex on again and pt much calmer, bed alarm on. Thermaguard capped off as ordered.Right groin Toggle removed by IR medical team today, right groi site CDI w/ small gauze dressing.  "

## 2022-01-18 NOTE — PHARMACY
Pharmacy Tube Feeding Consult    Medication reviewed for administration by feeding tube and for potential food/drug interactions.    Recommendation: No changes are needed at this time.     Pharmacy will continue to follow as new medications are ordered.    Prema Yang, KandisD

## 2022-01-18 NOTE — PROGRESS NOTES
"CLINICAL NUTRITION SERVICES - ASSESSMENT NOTE     Nutrition Prescription    Malnutrition Status:    Unable to determine due to lack of nutrition & wt hx.     Interventions by Registered Dietitian (RD):  - Initiate Osmolite 1.5 Napoleon @ 10 ml/hr. Adv by 10 ml q8h to eventual goal rate @ 50 ml/hr   - FWF 30 ml q4h for tube patency. MD to adjust as needed.   - Certavite daily.      Goal TF Regimen: Osmolite 1.5 Napoleon @ goal of  50ml/hr  (1200ml/day)  will provide: 1800 kcals (36 kcal/kg), 75 g protein (1.5 g/kg), 914 ml free H20, 244 g CHO, and 0 g fiber daily. This meets 100% estimated nutrition needs.        REASON FOR ASSESSMENT  Kimmy Gerardo is a 41 year old female assessed by dietitian for RD to assess & order TF. PMH includes recent ventral hernia repair at [facility] (1/13/22). Presented after out of hospital cardiac arrest (1/15/22), now extubated but lethargic.     NUTRITION HISTORY  Unable to obtain nutrition hx from patient at present. Per chart, pt previously healthy. SLP evaluated today, but recommend continued NPO status d/t concern for unsafe swallow. Plan to place post pyloric FT & start TF today.      CURRENT NUTRITION ORDERS  Diet: NPO x2 days    LABS  Labs reviewed    MEDICATIONS  Medications reviewed   Cefepime, miralax, senna-docusate, protonix    ANTHROPOMETRICS  Height: 167.6 cm (5' 6\")   Most Recent Weight: 50.5 kg (111 lb 5.3 oz)    IBW: 59.1 kg  BMI: Underweight BMI <18.5  Weight History: Lack of weight hx.    Wt Readings from Last 10 Encounters:   01/18/22 50.5 kg (111 lb 5.3 oz)   01/15/22 60 kg (132 lb 4.4 oz)     Dosing Weight: 50 kg (actual on 1/18)     ASSESSED NUTRITION NEEDS  Estimated Energy Needs: 1500 - 1750 kcals/day (30 - 35+ kcals/kg)  Justification: Increased needs post-op, underweight   Estimated Protein Needs: 75 - 100 grams protein/day (1.5 - 2 grams of pro/kg)  Justification: Increased needs post-op, repletion   Estimated Fluid Needs: 1 mL/kcal  Justification: Per provider pending " fluid status    PHYSICAL FINDINGS  See malnutrition section below.  Post pyloric FT in place (1/18), AXR pending.    MALNUTRITION  % Intake: Unable to assess  % Weight Loss: Unable to assess  Subcutaneous Fat Loss: Upper arm: Mild, Lower arm: Mild and Thoracic/intercostal: Mild  Muscle Loss: None observed  Fluid Accumulation/Edema: Does not meet criteria  Malnutrition Diagnosis: Unable to determine due to lack of nutrition & wt hx.     NUTRITION DIAGNOSIS  Inadequate protein-energy intake related to NPO (unsafe swallow) dependent on enteral nutrition support as evidenced by currently receiving 0% estimated nutrition needs.      INTERVENTIONS  See interventions at top of progress note.    Goals  Total avg nutritional intake to meet a minimum of 30 kcal/kg and 1.5 g PRO/kg daily (per dosing wt 50 kg).     Monitoring/Evaluation  Progress toward goals will be monitored and evaluated per protocol.  Willa Sutton, ASTON, LD  [id number]  Pgr: [phone]

## 2022-01-18 NOTE — PROCEDURES
Small Bowel Feeding Tube Placement Assessment  Reason for Feeding Tube Placement: Team request for post pyloric FT   Cortrak Start Time: 1330   Cortrak End Time: 1350  Medicine Delivered During Procedure: Lidocaine gel   Placement Successful: Presume post-pyloric (pending AXR confirmation).     Procedure Complications: None  Final Placement Mehdi at exit of nare: 94 cm  Face to Face time with patient: 20 min       Bridle Placement:   Reason for bridle placement: Securement of FT    Medicine delivered during procedure: lidocaine gel   Procedure: Successful  Location of top of clip on FT: @ 95 cm marker   Condition of nose/skin at time of bridle placement: Unremarkable   Face to Face time with patient: <5 minutes.    Willa Sutton RD, LD  [id number]  Pgr: [phone]

## 2022-01-19 NOTE — PLAN OF CARE
SLP: SLP continues to be inappropriate for swallow evaluation. Inadequate alertness of not following commands. Care conference tomorrow. SLP will follow through chart review and complete evaluation a pt is appropriate.

## 2022-01-19 NOTE — CONSULTS
Care Management Follow Up    Length of Stay (days): 4    Expected Discharge Date:  TBD     Concerns to be Addressed: Care Conference    Patient plan of care discussed at interdisciplinary rounds: Yes    Additional Information:  Team requested care conf. to be arranged for tomorrow, to provide update and discuss the plan of care.  RNCC visited pt spouse and discuss about the care conf.  Pt spouse agreed to meet tomorrow, 1/20 at 2:00.  RNCC provided conf. Call in number.  Pt spouse stated pt has 16 yrs old d tr and he would like her to come and participate at the care conf.  The team are checking with management to obtain approval.  Team members that have been informed about the care conf. are; CSI, Palliative, Neuro ICU, bedside RN, SW and Charge nurse.  RNCC will cont to follow plan of care.    Care con. Thursday, 1/20 at 2:00, providers meeting at 1:45 in 4C conf. Room.  Conf. Call in # [phone]  ID- [id number]  QYZ- [id number]      Maribel Reich RN, PHN, BSN  4A and 4E/ ICU  Care Coordinator  Phone: [phone]  Pager: [phone]    To get in touch with weekend & Holiday on call RN Care Coordinator  Page [phone] or Care Coordinator Job code/pager- [phone]

## 2022-01-19 NOTE — PLAN OF CARE
Major Shift Events:  Not following commands or speaking, only moaning when agitated. Still posturing, moves all extremities but not purposefully. Continuous EGG. Agitation significantly improved, Precedex off for ~9hrs. Restarted due to increased agitation without relief from PRN Haldol. Haldol given 2x. Strong/nonproductive cough, remains on RA. TF infusing at 20mL/hr, advance @ 1000. No BM this shift but is passing gas. Bladder scanned and straight cathed for 500mL @ 0000 due to retention. Bladder scanned again @ 0600 for 91mL- Cards Fellow notified, 500mL NS bolus ordered. Heparin gtt titrated per HepXa. Potassium replaced per RN managed protocol.     Plan: Wean Precedex as able. Speech to eval again today. Bladder scan/straight cath for retention.     For vital signs and complete assessments, please see documentation flowsheets.     Kathy Reyes, RN on 1/19/2022 at 6:36 AM

## 2022-01-19 NOTE — PLAN OF CARE
Major Shift Events: Precedex titrated for agitation, off since 1345. High RR and HR with moaning and frequent coughing when agitated. Withdrawing / posturing with stimulation. Not following commands and no comprehensible words. Scheduled seroquel continued, no Haldol given. MRI completed this afternoon. EEG break given but resumed this afternoon. SBP maintaining <160. Ramsey replaced for retention and temperature monitoring. Febrile up to 101.7; scheduled Tylenol, fan, and ice packs used. TF titrated up per order. One loose BM.   Plan: Care conference tomorrow.   For vital signs and complete assessments, please see documentation flowsheets.

## 2022-01-19 NOTE — CONSULTS
"[facility]  Palliative Care Consultation Note    Patient: Kimmy Gerardo  Date of Admission:  1/15/2022    Requesting Clinician / Team: CSI  Reason for consult: Goals of care  Patient and family support    Recommendations:   These recommendations have been discussed with primary CSI team and ICU RN.  Palliative Care will continue to follow for support and attend IDT/family conference(s) as needed.  Goals for now are fully restorative with support of family who is aware of anticipated lengthy recovery. Offered empathetic listening and dialogue with spouse. Encouraged Tao to bring in photos of family and pets. Pt enjoys \" the weekend\" musician and sings karaoke.   Will have PC  see for support.  CODE STATUS: full code       Thank you for the opportunity to participate in the care of this patient and family. Our team: will continue to follow.     During regular M-F work hours -- if you are not sure who specifically to contact -- please contact us by sending a text page to our team consult pager at [phone].    After regular work hours and on weekends/holidays, you can call our answering service at [phone]. Also, who's on call for us is available in [facility] [employer].       Silver KENNEY NP ACHPN  Nurse Practitioner- Lead Advanced Practice Provider  [facility] Palliative Medicine Consult Service   [phone]    TT spent: 40 minutes of which 25 minutes were spent in direct face to face contact with patient/family. Patient teaching done regarding role of palliative care. Greater than 50% of time spent counseling and/or coordinating care.     Assessments:  Kimmy Gerardo is a 41 year old female with h/o recent ventral hernia s/p repair 1/13/22 at OSH and discharge home for further convalescence, admitted [facility] ED via EMS 1/15/2022 s/p out-of-hospital PEA cardiac arrest. She was found by her  in bathroom to be unresponsive-->he performed " "bystander CPR--> ROSC in the field with a couple of intermittent PEA arrests on arrival. Work up --> CT showed large saddle PE admitted for IR thrombectomy with [facility] ECMO back up-she did not need ECMO. She was extubated 1/17/22 to some wakefulness. EEG described as irritable per NCC. Monitoring ongoing and further MRI imaging pending.     Today, 1/19/22 the patient was seen for PC consultation due to ongoing possible anoxic brain injury concerns and family coping.     Prognosis, Goals, & Planning:      Functional Status just prior to hospitalization: 0 (Fully active, able to carry on all activities without restriction)      Prognosis, Goals, and/or Advance Care Planning were addressed today: Yes        Summary/Comments: full restorative goals      Patient's decision making preferences: shared with support from loved ones          Patient has decision-making capacity today for complex decisions: No            I have concerns about the patient/family's health literacy today: No           Patient has a completed Health Care Directive: No.       Code status: Full Code    Coping, Meaning, & Spirituality:   Mood, coping, and/or meaning in the context of serious illness were addressed today: Yes  Summary/Comments: Family and her \"passion\" career are important to Kimmy. She has roots in [place]. Has a dog and cat at home- Bokee and Sarbari.     Social:     Living situation: Lives with spouse in [facility]. Was completely independent     Key family / caregivers: spouse- Tao; 2 adult and 1 adolescent children.     Occupational history:  for [employer]- [employer] and [employer]. Served in  as well.    History of Present Illness:  History gathered today from: family/loved ones, ICU/CSI  team and unit staff.     Key Palliative Symptom Data:  We are not helping to manage these symptoms currently in this patient.    ROS:  Comprehensive ROS is unable to be obtained 2/2 critically ill and AMS.      " Past Medical History:  No past medical history on file.     Past Surgical History:  Past Surgical History:   Procedure Laterality Date     IR PULMONARY ANGIOGRAM BILATERAL  1/15/2022         Family History:  No family history on file.     Allergies:  No Known Allergies     Medications:  I have reviewed this patient's medication profile and medications from this hospitalization.    Physical Exam:  Vital Signs: Temp: (!) 100.9  F (38.3  C) Temp src: Bladder BP: (!) 150/75 Pulse: 109   Resp: 29 SpO2: 94 % O2 Device: None (Room air)    Weight: 110 lbs 14.26 oz  General: In ICU bed, NAD. NG tube L nares. Non verbal EEG leads being applied.  HEENT: no scleral icterus or injection  CARDIAC: RRR, no m appreciated. Peripheral pulses iintact  RESP: CTAB, no wheezes.   GI: soft, non focal brief exam.  : Ramsey  EXTREMITIES: No LE edema, pulses 2+.     NEURO: non verbal and sedated. No purposeful movement at the time of exam.   Data reviewed:  ROUTINE LABS (Last four results)  CMPRecent Labs   Lab 01/19/22  1131 01/19/22  1006 01/19/22  0457 01/18/22  2351 01/18/22  1811 01/18/22  0345 01/18/22  0339 01/18/22  0024 01/17/22  2141 01/17/22  1547 01/17/22  1546 01/17/22  0343 01/17/22  0340 01/16/22  0337 01/16/22  0336   NA  --   --  142  --  141  --  137  --   --   --  135  --  138   < > 140   POTASSIUM  --  3.5 3.3* 3.7 3.2*  --  3.8  --   --   --  3.8  --  3.6   < > 3.6   CHLORIDE  --   --  113*  --  110*  --  107  --   --   --  106  --  109   < > 113*   CO2  --   --  22  --  22  --  23  --   --   --  20  --  19*   < > 22   ANIONGAP  --   --  7  --  9  --  7  --   --   --  9  --  10   < > 5   *  --  120*  114*  --  136*  131*   < > 134*  116*   < > 121*   < > 125*   < > 86   < > 95   BUN  --   --  10  --  5*  --  7  --   --   --  8  --  8   < > 13   CR  --   --  0.58  --  0.62  --  0.56  --   --   --  0.60  --  0.59   < > 0.47*   GFRESTIMATED  --   --  >90  --  >90  --  >90  --   --   --  >90  --  >90   < > >90    LUDA  --   --  8.3*  --  8.4*  --  8.4*  --   --   --  8.0*  --  8.0*   < > 7.3*   MAG  --   --  1.9  --  2.0  --  2.4*  --  1.6  --  1.7  --  1.7   < > 1.9   PHOS  --   --  1.3*  --   --   --  2.4*  --   --   --   --   --  2.6  --  3.4   PROTTOTAL  --   --  6.0*  --  6.5*  --  5.9*  --   --   --  6.0*  --  5.2*   < > 5.3*   ALBUMIN  --   --  2.4*  --  2.6*  --  2.4*  --   --   --  2.6*  --  2.2*   < > 2.7*   BILITOTAL  --   --  0.4  --  0.4  --  0.4  --   --   --  0.3  --  0.3   < > 0.2   ALKPHOS  --   --  48  --  50  --  46  --   --   --  46  --  35*   < > 30*   AST  --   --  90*  --  108*  --  113*  --   --   --  112*  --  74*   < > 187*   ALT  --   --  85*  --  98*  --  107*  --   --   --  130*  --  132*   < > 204*    < > = values in this interval not displayed.     CBC  Recent Labs   Lab 01/19/22 0457 01/18/22  1811 01/18/22  0339 01/17/22  1546   WBC 7.4 9.3 8.5 11.4*   RBC 2.66* 2.58* 2.68* 3.01*   HGB 8.3* 8.1* 8.4* 9.5*   HCT 23.8* 22.9* 24.4* 27.6*   MCV 90 89 91 92   MCH 31.2 31.4 31.3 31.6   MCHC 34.9 35.4 34.4 34.4   RDW 12.5 12.5 12.6 12.5    131* 98* 98*     INR  Recent Labs   Lab 01/19/22 0457 01/18/22  0339 01/17/22  0340 01/17/22  0052   INR 1.23* 1.40* 1.64* 1.53*     Arterial Blood Gas  Recent Labs   Lab 01/18/22  0339 01/17/22  2141 01/17/22  1657 01/17/22  1547   PH 7.45 7.44 7.43 7.47*   PCO2 33* 34* 31* 28*   PO2 81 107* 125* 77*   HCO3 23 23 21 21   O2PER 1 2 4 30

## 2022-01-19 NOTE — CONSULTS
Care Management Initial Consult    General Information  Assessment completed with: Spouse or significant other, Pt's gato, Tao  Type of CM/SW Visit: Initial Assessment    Primary Care Provider verified and updated as needed: Yes   Readmission within the last 30 days: no previous admission in last 30 days      Reason for Consult: discharge planning  Advance Care Planning: Advance Care Planning Reviewed: no concerns identified. Pt does not have an active healthcare directive on file.         Communication Assessment  Patient's communication style: spoken language (English or Bilingual)    Hearing Difficulty or Deaf: no   Wear Glasses or Blind: no    Cognitive  Cognitive/Neuro/Behavioral: .WDL except  Level of Consciousness: confused,lethargic  Arousal Level: opens eyes spontaneously  Orientation: disoriented x 4  Mood/Behavior: labile  Best Language:  (RAJI)  Speech: hoarse,incomprehensible sounds    Living Environment:   People in home: child(leroy), dependent,spouse     Current living Arrangements: house      Able to return to prior arrangements: no, PT currently recommending TCU at discharge        Family/Social Support:  Care provided by: self  Provides care for: child(leroy)  Marital Status:   ,Children  Tao       Description of Support System: Supportive,Involved    Support Assessment: Adequate family and caregiver support,Adequate social supports    Current Resources:   Patient receiving home care services: No     Community Resources: None  Equipment currently used at home: none  Supplies currently used at home: None    Employment/Financial:  Employment Status: employed full-time        Financial Concerns: No concerns identified   Referral to Financial Counselor: No       Lifestyle & Psychosocial Needs:  Social Determinants of Health     Tobacco Use: Not on file   Alcohol Use: Not on file   Financial Resource Strain: Not on file   Food Insecurity: Not on file   Transportation Needs: Not on file    Physical Activity: Not on file   Stress: Not on file   Social Connections: Not on file   Intimate Partner Violence: Not on file   Depression: Not on file   Housing Stability: Not on file       Functional Status:  Prior to admission patient needed assistance:   Dependent ADLs:: Independent  Dependent IADLs:: Independent  Assesssment of Functional Status: Needs placement in a SNF/TCF for rehabilitation    Mental Health Status:  Mental Health Status: No Current Concerns       Chemical Dependency Status:  Chemical Dependency Status: No Current Concerns             Values/Beliefs:  Spiritual, Cultural Beliefs, Alevism Practices, Values that affect care: yes  Description of Beliefs that Will Affect Care: Otoniel            Additional Information:  Per H&P: Kimmy Gerardo is a 41 year old female with PMHx of ventral hernia s/p repair who presents after out of hospital cardiac arrest.     Pt remains disoriented and not following commands. RANDA spoke with pt's  Tao outside of pt's room to complete initial assessment. Tao confirmed that he and pt live in a house in [place] with two dependent children. One adult child spends some time in the home as well. 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 Tao noted that he has been in contact with pt's employer regarding short term disability paperwork and is aware that SW can assist with this as needed. Discussed PT TCU recommendation, Tao agrees with this rec and requested list of facilities near their home in [place]. SW emailed Tao Medicare list. Tao is aware that pt is still a ways out from being medically ready for discharge.     RANDA will continue to follow plan of care.      IMANI Islas, [facility] HospitalSW  ICU   [facility]  Phone: [phone]  Pager: [phone]

## 2022-01-19 NOTE — PROGRESS NOTES
01/18/22 1600   Quick Adds   Type of Visit Initial PT Evaluation   Living Environment   People in home spouse;child(leroy), dependent   Current Living Arrangements house   Home Accessibility stairs to enter home;stairs within home   Number of Stairs, Main Entrance 3   Stair Railings, Main Entrance none   Number of Stairs, Within Home, Primary greater than 10 stairs   Stair Railings, Within Home, Primary railing on left side (ascending)   Transportation Anticipated car, drives self;family or friend will provide   Living Environment Comments Patient lives with spouse and 15 y/o daughter in two-level home with bed/bath on 2nd level.   Self-Care   Usual Activity Tolerance excellent   Current Activity Tolerance poor   Regular Exercise Yes   Exercise Amount/Frequency daily   Equipment Currently Used at Home none   Activity/Exercise/Self-Care Comment Patient is IND and very active at baseline. Exercises daily at cross training gym or home gym. Patient works from home.   Disability/Function   Hearing Difficulty or Deaf no   Wear Glasses or Blind no   Concentrating, Remembering or Making Decisions Difficulty no   Difficulty Communicating no   Difficulty Eating/Swallowing no   Walking or Climbing Stairs Difficulty no   Dressing/Bathing Difficulty no   Toileting issues no   Doing Errands Independently Difficulty (such as shopping) no   Fall history within last six months no   Change in Functional Status Since Onset of Current Illness/Injury yes   General Information   Onset of Illness/Injury or Date of Surgery 01/15/22   Referring Physician Yamel Baum PA-C   Pertinent History of Current Problem (include personal factors and/or comorbidities that impact the POC) Kimmy Gerardo is a 41 year old female with PMHx of ventral hernia s/p repair who presents after out of hospital cardiac arrest with ROSC 2/2 PE.    Existing Precautions/Restrictions fall;cardiac;abdominal  (Ventral hernia s/p open repair 1/13/22)    Cognition   Orientation Status (Cognition) unable/difficult to assess   Affect/Mental Status (Cognition) low arousal/lethargic  (restless)   Follows Commands (Cognition) does not follow one-step commands   Cognitive Status Comments Patient with labile mental status- varies between restless and lethargic, no command follow during PT evaluation, occasional garbled and incoherent speech. Per RN and chart review, patient has had labile behaviors. Yesterday patient inconsistently following simple commands and communicating, although disoriented to time, place and situation at that time.    Pain Assessment   Patient Currently in Pain Yes, see Vital Sign flowsheet  (intermittent grimacing)   Range of Motion (ROM)   ROM Comment BLE WFL when relaxed, currently limited by increased tone   Strength   Strength Comments Unable to assess 2/2 mental status and tone   Bed Mobility   Comment (Bed Mobility) Total A for supine rolling   Transfers   Transfer Safety Comments Dependent for transfers via ceiling lift   Gait/Stairs (Locomotion)   Comment (Gait/Stairs) Unable to assess, currently dependent for mobility   Balance   Balance Comments Unable to assess 2/2 mental status/lethargy   Sensory Examination   Sensory Perception Comments Unable to formally assess however patient responds to touch in B UE/LE   Coordination   Coordination Comments Unable to assess 2/2 mental status/lethargy   Muscle Tone   Muscle Tone Comments Increased muscle tone througout, B LEs extended with plantarflexed ankles, Intermittent abnormal posturing in B UEs with extended elbows and flexed wrists.    Clinical Impression   Criteria for Skilled Therapeutic Intervention yes, treatment indicated   PT Diagnosis (PT) impaired functional mobility   Influenced by the following impairments strength, balance, activity tolerance, muscle tone, pain, mental status   Functional limitations due to impairments bed mobility, transfers, gait, stairs, functional endurance    Clinical Presentation Unstable/Unpredictable   Clinical Presentation Rationale medical status, comorbidities, clinical judgement   Clinical Decision Making (Complexity) high complexity   Therapy Frequency (PT) 5x/week   Predicted Duration of Therapy Intervention (days/wks) 4 weeks   Planned Therapy Interventions (PT) balance training;bed mobility training;gait training;home exercise program;motor coordination training;neuromuscular re-education;patient/family education;postural re-education;ROM (range of motion);stair training;strengthening;stretching;transfer training;progressive activity/exercise;risk factor education;home program guidelines   Risk & Benefits of therapy have been explained evaluation/treatment results reviewed;care plan/treatment goals reviewed;risks/benefits reviewed;participants voiced agreement with care plan;participants included;spouse/significant other   PT Discharge Planning    PT Discharge Recommendation (DC Rec) Transitional Care Facility   PT Rationale for DC Rec Patient is significantly below IND and active baseline, currently dependent for mobility with OH lift. Pending increased participation and progress in therapy may be good ARU candidate, will continue to assess.   Total Evaluation Time   Total Evaluation Time (Minutes) 10

## 2022-01-19 NOTE — PLAN OF CARE
Major Shift Events:  Labile behavior, very restless at times.  During times of restlessness, she was diaphoretic, tachycardic and tachypneic. Precedex continues to be maxed. Seroquel and melatonin started for restlessness. Continuous EEG; Valproate started. SLP seen pt today but speech evaluation was unsuccessful d/t somnolence.  Dietician placed NJ tube for meds; tube feeding started today. Pt up to chair this evening and tolerated well. Weaned to room air. 500cc NS bolus.  Plan: Wean precedex as tolerated. Continuous EEG. Delirium precautions.   For vital signs and complete assessments, please see documentation flowsheets.

## 2022-01-19 NOTE — PROGRESS NOTES
[facility]   Critical Care Cardiology - Progress Note    Kimmy Gerardo MRN: [id number]  Age: 41 year old, : 1980  Date: 2022    Assessment and Plan:  Kimmy Gerardo is a 41 year old female with PMHx of ventral hernia s/p repair who presents after out of hospital cardiac arrest with ROSC 2/2 PE.       Per report, patient underwent recent ventral hernia 22 repair at [facility].  1/15 am,  noted patient to be pale and clammy after using bathroom and then became unresponsive. He started CPR and EMS was called, arriving shortly thereafter. Initial rhythm not shockable. She did achieve ROSC but did arrest two additional times en route to hospital and had ongoing CPR on arrival to [facility] ED. She was intubated and taken to CT scanner which revealed large saddle PE with concern for right heart strain  She was transferred to [facility] where she was taken urgently to IR suite for pulmonary angiogram with thrombectomy. Pre thrombectomy PA pressures  50/26 (35) and post thrombectomy 35/15 (26). She was resumed on IV Heparin and transferred to Cardiology ICU for continued cares.      Today's Plan:  - MRI of the brain today  - plan for care conference tomorrow  - wean precedex as able    Neurology   # Anoxic brain injury   # Delirium   S/p therapeutic cooling. CTH revealed no acute intracranial pathology. vEEG without seizures, although irritable.  Started on valproic acid  per Neurocrit. Qtc 460ms this AM with addition of Seroquel  - MRI brain today  - continue precedex ggt   - valproic acid per Neurocrit  - melatonin, 10mg at night  - Seroquel 50mg at night, 25mg PO BID  - Neuro-crit consulted and appreciate recommendations.   - Palliative care consulted and appreciate recommendations.     Cardiovascular  # OOHCA with ROSC 2/2 PE  # Moderate RV Dysfunction secondary to PE  # Tachycardia, Sinus- resolved  History as above. Echo 22 revealed LVEF 65%,  flattened septum/right ventricular pressure/volume elevated, mild RV  Dilation, global right ventricular function moderately reduced. Troponin initial 57-->peaked at 3548--> down trending  - Telemetry monitoring  - PRN hydralazine for HTN . Goal MAP <160     Pulmonary  # Massive saddle PE s/p mechanical thrombectomy  # Probable aspiration pneumonia  # Acute hypoxemic respiratory failure requiring intubation, resolved  Pre thrombectomy PA pressures  50/26 (35) and post thrombectomy 35/15 (26). Extubated 1/17/22. Now on RA  - Heparin for PE as below     Infectious Disease  # Aspiration Pneumonia  # Leukocytosis in setting of arrest/PE  WBC 7.4. Cultured 1/18 - NGTD  Cultures thus far:   - MRSA negative  Antibiotics   - Vancomycin 1/15- 1/16   - Cefepime 1/15- 1/18   - Rocephin 1/18 - present     Renal, Electrolytes  # Hypoalbuminemia   Lactic acidosis resolved, now 0.6. Creatinine 0.56. Making urine  - Avoid nephrotoxins  - give small fluid bolus this AM        Gastrointestinal, Nutrition  # Shock liver 2/2 cardiac arrest  # Underweight  # Gallbladder edema   No known medical hx. LFTs improving  - speech consult  - Bowel regimen; miralax, senna  - stop protonix     Hematology  # Thrombocytopenia  # Saddle PE  # Anemia, acute blood loss and critical illness  Hgb 8.3, drifting down. Plt 150  - Receiving heparin, high intensity for PE, start OAC coming days once warm  - Transfuse for Hgb < 8  - DVT PPX: Heparin as above     Endocrinology  # Hyperglycemia in the setting critical illness  No known medical history. Hgb a1c 5.4%     Integumentary:  No skin issues    Pertinent Lines  Arterial line - remove today  Femoral central line - remove today    ICU Cares:  Daily CXR: N/A, extubated  Fluids/Feeds: TF via NJ. Intermittent fluid bolus as needed  DVT Prophylaxis: heparin ggt  GI Prophylaxis: N/A, extubated  Bowel Regimen: senna, miralax  Anticipated Floor Transfer: unclear, possibly 1-3 additional days    Family Update:  , update by me    Patient seen and discussed with Dr. Dede Kaiser.  Assessment and plan as above.    Yamel Baum PA-C  Critical Care Cardiology  Pager: [phone]      Interval History:  Intermittent somnolence, agitation.       Objective Findings:  Temp:  [98.1  F (36.7  C)-102  F (38.9  C)] 98.9  F (37.2  C)  Pulse:  [] 111  Resp:  [15-37] 31  BP: ()/(53-94) 138/84  MAP:  [79 mmHg-111 mmHg] 111 mmHg  Arterial Line BP: (116-159)/(56-82) 159/81  SpO2:  [93 %-100 %] 95 %    I/O:  Intake/Output Summary (Last 24 hours) at 1/19/2022 1049  Last data filed at 1/19/2022 1000  Gross per 24 hour   Intake 2432.51 ml   Output 1735 ml   Net 697.51 ml       Physical Exam:  GEN: resting comfortably in bed  HEENT: no cranial trauma  Pulm: seemingly CTAB  Cardiac: regular rate/rhythm. No murmur, rub, gallop  GI: soft, non distended  Neuro: does not follow commands.  Pupils 5-6, reactive. intermittently moves all extremities  Integument: no appreciable skin breakdown  Vascular: LE warm, well perfused      Medications:    sodium chloride 0.9%  500 mL Intravenous Once     acetaminophen  650 mg Oral or Feeding Tube Q4H     cefTRIAXone  2 g Intravenous Q24H     melatonin  10 mg Oral or Feeding Tube QPM     multivitamins w/minerals  15 mL Per Feeding Tube Daily     pantoprazole (PROTONIX) IV  40 mg Intravenous Daily     polyethylene glycol  17 g Oral or Feeding Tube Daily     potassium & sodium phosphates  2 packet Oral or Feeding Tube TID     QUEtiapine  25 mg Oral or Feeding Tube BID     QUEtiapine  50 mg Oral or Feeding Tube QPM     senna-docusate  1 tablet Oral or Feeding Tube At Bedtime     valproate (DEPACON) in NS intermittent infusion  500 mg Intravenous Q8H       dexmedetomidine 0.6 mcg/kg/hr (01/19/22 0635)     dextrose       heparin 1,300 Units/hr (01/19/22 0600)         Labs:   CMP  Recent Labs   Lab 01/19/22  0457 01/18/22  2351 01/18/22  1811 01/18/22  0345 01/18/22  0339 01/18/22  0024  01/17/22 2141 01/17/22  1547 01/17/22  1546 01/17/22  0343 01/17/22  0340 01/16/22  0337 01/16/22  0336     --  141  --  137  --   --   --  135  --  138   < > 140   POTASSIUM 3.3* 3.7 3.2*  --  3.8  --   --   --  3.8  --  3.6   < > 3.6   CHLORIDE 113*  --  110*  --  107  --   --   --  106  --  109   < > 113*   CO2 22  --  22  --  23  --   --   --  20  --  19*   < > 22   ANIONGAP 7  --  9  --  7  --   --   --  9  --  10   < > 5   *  114*  --  136*  131*   < > 134*  116*   < > 121*   < > 125*   < > 86   < > 95   BUN 10  --  5*  --  7  --   --   --  8  --  8   < > 13   CR 0.58  --  0.62  --  0.56  --   --   --  0.60  --  0.59   < > 0.47*   GFRESTIMATED >90  --  >90  --  >90  --   --   --  >90  --  >90   < > >90   LUDA 8.3*  --  8.4*  --  8.4*  --   --   --  8.0*  --  8.0*   < > 7.3*   MAG 1.9  --  2.0  --  2.4*  --  1.6  --  1.7  --  1.7   < > 1.9   PHOS 1.3*  --   --   --  2.4*  --   --   --   --   --  2.6  --  3.4   PROTTOTAL 6.0*  --  6.5*  --  5.9*  --   --   --  6.0*  --  5.2*   < > 5.3*   ALBUMIN 2.4*  --  2.6*  --  2.4*  --   --   --  2.6*  --  2.2*   < > 2.7*   BILITOTAL 0.4  --  0.4  --  0.4  --   --   --  0.3  --  0.3   < > 0.2   ALKPHOS 48  --  50  --  46  --   --   --  46  --  35*   < > 30*   AST 90*  --  108*  --  113*  --   --   --  112*  --  74*   < > 187*   ALT 85*  --  98*  --  107*  --   --   --  130*  --  132*   < > 204*    < > = values in this interval not displayed.     CBC  Recent Labs   Lab 01/19/22  0457 01/18/22  1811 01/18/22  0339 01/17/22  1546   WBC 7.4 9.3 8.5 11.4*   RBC 2.66* 2.58* 2.68* 3.01*   HGB 8.3* 8.1* 8.4* 9.5*   HCT 23.8* 22.9* 24.4* 27.6*   MCV 90 89 91 92   MCH 31.2 31.4 31.3 31.6   MCHC 34.9 35.4 34.4 34.4   RDW 12.5 12.5 12.6 12.5    131* 98* 98*     Arterial Blood Gas  Recent Labs   Lab 01/18/22  0339 01/17/22  2141 01/17/22  1657 01/17/22  1547   PH 7.45 7.44 7.43 7.47*   PCO2 33* 34* 31* 28*   PO2 81 107* 125* 77*   HCO3 23 23 21 21   O2PER 1 2 4 30          Pertinent Imaging Studies:  Echo 1/16/2022:   Left ventricular function is normal.The ejection fraction is 60-65%.  Flattened septum is consistent with right ventricular pressure and volume overload.  Mild right ventricular dilation is present. Global right ventricular function is moderately reduced.  Mild tricuspid insufficiency is present.  Estimated pulmonary artery systolic pressure is 26 mmHg plus right atrial pressure.  Patient is on the ventilator, mean RA pressure likely low given the normal IVC size with respirophasic variability.  No pericardial effusion is present.      Yamel Baum PA-C  Critical Care Cardiology  Pager: [phone]

## 2022-01-19 NOTE — PHARMACY-ADMISSION MEDICATION HISTORY
Admission Medication History Completed by Pharmacy    See [facility] Admission Navigator for allergy information, preferred outpatient pharmacy, prior to admission medications and immunization status.     Medication History Sources:     Patient's spouse    Care Everywhere    Changes made to PTA medication list (reason):    Added: Everything below has been added per Care Everywhere (confirmed with spouse)    Deleted: None    Changed: None    Additional Information:    Patient's spouse helped by looking through her medications at home and also the list that she provided previous hospital before her surgery 1/14. He doesn't believe that she takes anything else aside from these medications.    The patient does go [facility].    Prior to Admission medications    Medication Sig Last Dose Taking? Auth Provider   cetirizine (ZYRTEC) 10 MG tablet Take 10 mg by mouth daily 1/12/2022 Yes Unknown, Entered By History   diphenhydrAMINE (BENADRYL) 25 MG capsule Take 25 mg by mouth nightly as needed for itching or allergies Past Month Yes Unknown, Entered By History   fluticasone (FLONASE) 50 MCG/ACT nasal spray Spray 2 sprays into both nostrils daily Past Month Yes Unknown, Entered By History   ketotifen (ZADITOR) 0.025 % ophthalmic solution Place 1 drop into both eyes 2 times daily Past Month Yes Unknown, Entered By History         Date completed: 01/18/22    Medication history completed by: YESICA Fuchs, Pharmacy Intern

## 2022-01-20 PROBLEM — G93.1 ANOXIC BRAIN INJURY (H): Status: ACTIVE | Noted: 2022-01-01

## 2022-01-20 NOTE — PROGRESS NOTES
[facility] Cardiology  Date: 1/20/2022    After goals of care conference, patient transitioned to comfort care.  We will continue antiepileptics at this point in time so as to avoid unnecessary pain/suffering related to seizures  We will maintain her NG tube so that if able to transition IV antiepileptic to PO, we will have access.  We will maintain her goncalves for comfort.    Code remains DNR/DNI.      Yamel Baum PA-C

## 2022-01-20 NOTE — PLAN OF CARE
ICU End of Shift Summary: See flowsheets for vital signs and detailed assessment    Changes this shift: Opens eyes spontaneously. Does not follow commands or track. Flexion to pain. Continuous EEG in place. Occasional restlessness correlated w/ increased HR, BP, & RR- Seroquel given as scheduled. Additional calming measures (music, repositioning, etc..) seem to help reduce restlessness in addition to meds. Precedex gtt off since yesterday afternoon. HR 90s-110s, depending on pt's comfort level. Occasionally hypertensive, 140s/80s- below parameters for intervention. Febrile overnight, tmax 101.8- Tylenol given as scheduled. SpO2 > 92% on room air. Patient will occasionally cough/gag (gagging likely d/t NJ tube in place). Tachypneic when restless (RR high 20s/low 30s). Loose BM x 3 overnight- scheduled laxatives held. Tube feeds increased to goal of 50 ml/hr at 0200. Ramsey patent w/ adequate UOP. Small vaginal spotting noted w/ nic cares. Hep 10A subtherapeutic- per order, 1,550 unit bolus given x 1 & heparin gtt rate increased by 150 units/hr to 1450 units/hr. Hep 10A recheck scheduled for 11am. Ionized kylah 4.2 this morning- replaced x 1. No acute events overnight.     Plan: Care conference scheduled today @ 2pm- spouse & daughter expected to attend.

## 2022-01-20 NOTE — PROGRESS NOTES
PALLIATIVE CARE SOCIAL WORK Progress Note   [facility] Unit [address]    REFERRAL SOURCE: Palliative Care Team    Joint visit this morning with Palliative Care NP, , 16 year old daughter, & ex-. They report good open communication and support for each other. Discussed listening to themselves and what each other need at this time. That what one person needs might be different than the others.     Plan: PCSW will continue to be available for support while Palliative Care is following.     IMANI Ward, [facility]  Palliative Care Clinical   Pager [phone]    [facility] Inpatient Team Consult Pager [phone] Mon-Fri 8-4:30  After hours Answering Service [phone]

## 2022-01-20 NOTE — PROGRESS NOTES
[facility]   Critical Care Cardiology - Progress Note    Kimmy Gerardo MRN: [id number]  Age: 41 year old, : 1980  Date: 2022    Assessment and Plan:  Kimmy Gerardo is a 41 year old female with PMHx of ventral hernia s/p repair who presents after out of hospital cardiac arrest with ROSC 2/2 PE.       Per report, patient underwent recent ventral hernia 22 repair at [facility].  1/15 am,  noted patient to be pale and clammy after using bathroom and then became unresponsive. He started CPR and EMS was called, arriving shortly thereafter. Initial rhythm not shockable. She did achieve ROSC but did arrest two additional times en route to hospital and had ongoing CPR on arrival to [facility] ED. She was intubated and taken to CT scanner which revealed large saddle PE with concern for right heart strain  She was transferred to [facility] where she was taken urgently to IR suite for pulmonary angiogram with thrombectomy. Pre thrombectomy PA pressures  50/26 (35) and post thrombectomy 35/15 (26). She was resumed on IV Heparin and transferred to Cardiology ICU for continued cares.      CODE STATUS: briefly, updated  regarding seizure development this morning.  Described poor prognotic indicator in the setting of cardiac arrest.  He feels the patient would like to be DNR/DNI at this point in time if she were able to speak for her self.  Code status updated.      Today's Plan:  - start scheduled ativan given responsiveness during seizure  - stop valproic acid  - care conference today    Neurology   # Anoxic brain injury   # Delirium   S/p therapeutic cooling. CTH revealed no acute intracranial pathology. vEEG without seizures, although irritable.  Started on valproic acid  per Neurocrit. Qtc 460ms with addition of Seroquel. MRI with extensive anoxic injury .  - scheduled ativan  - stop seroquel  - melatonin, 10mg at night  - Neuro-crit consulted  and appreciate recommendations.   - Palliative care consulted and appreciate recommendations.     Cardiovascular  # OOHCA with ROSC 2/2 PE  # Moderate RV Dysfunction secondary to PE  # Tachycardia, Sinus- resolved  History as above. Echo 1/16/22 revealed LVEF 65%, flattened septum/right ventricular pressure/volume elevated, mild RV  Dilation, global right ventricular function moderately reduced. Troponin initial 57-->peaked at 3548--> down trending  - Telemetry monitoring  - PRN hydralazine for HTN . Goal MAP <160     Pulmonary  # Massive saddle PE s/p mechanical thrombectomy  # Probable aspiration pneumonia  # Acute hypoxemic respiratory failure requiring intubation, resolved  Pre thrombectomy PA pressures  50/26 (35) and post thrombectomy 35/15 (26). Extubated 1/17/22. Now on RA  - Heparin for PE as below     Infectious Disease  # Aspiration Pneumonia  # Leukocytosis in setting of arrest/PE  WBC 7.4. Cultured 1/18 - NGTD  Cultures thus far:   - MRSA negative  Antibiotics   - Vancomycin 1/15- 1/16   - Cefepime 1/15- 1/18   - Rocephin 1/18 - present (stop today)     Renal, Electrolytes  # Hypoalbuminemia   Lactic acidosis resolved, now 0.6. Creatinine 0.58. Making urine  - Avoid nephrotoxins        Gastrointestinal, Nutrition  # Shock liver 2/2 cardiac arrest  # Underweight  # Gallbladder edema   No known medical hx. LFTs improving  - Bowel regimen; miralax, senna     Hematology  # Thrombocytopenia  # Saddle PE  # Anemia, acute blood loss and critical illness  Hgb 8.3, drifting down. Plt 150  - Receiving heparin, high intensity for PE, start OAC coming days once warm  - Transfuse for Hgb < 8  - DVT PPX: Heparin as above     Endocrinology  # Hyperglycemia in the setting critical illness  No known medical history. Hgb a1c 5.4%     Integumentary:  No skin issues    Pertinent Lines  N/A    ICU Cares:  Daily CXR: N/A, extubated  Fluids/Feeds: TF via NJ. Intermittent fluid bolus as needed  DVT Prophylaxis: heparin ggt  GI  Prophylaxis: N/A, extubated  Bowel Regimen: senna, miralax  Anticipated Floor Transfer: N/A    Family Update: , update by me    Time Spent on this Encounter   Kimmy was seen and evaluated by me on 01/20/22.  She was in critical condition as the result of anoxic brain injury/cardiac arrest.    Her condition is now Poor.      The acute issues managed by me today include  Anoxic brain injury   Supportive interventions provided and/or ordered by me include: labs, vitals, family care conference totaling approximately 65 minutes of time     Total Critical Care time spent by me, excluding procedures, was ~125 minutes    Patient seen and discussed with Dr. Dede Kaiser.  Assessment and plan as above.    Yamel Baum PA-C  Critical Care Cardiology  Pager: [phone]      Interval History:  Seizure this mornign involving netirety of brain. MRI with anoxic injury.  Care conference later today. Patient continues to not respond to commands/stimuli at present. Intermittent moaning.      Objective Findings:  Temp:  [98.7  F (37.1  C)-101.8  F (38.8  C)] 100.9  F (38.3  C)  Pulse:  [] 117  Resp:  [24-40] 31  BP: (125-156)/(66-94) 148/86  SpO2:  [91 %-98 %] 95 %    I/O:  Intake/Output Summary (Last 24 hours) at 1/20/2022 1106  Last data filed at 1/20/2022 1100  Gross per 24 hour   Intake 2521.9 ml   Output 2440 ml   Net 81.9 ml       Physical Exam:  GEN: appears restful.  HEENT: no cranial trauma  Pulm: seemingly CTAB  Cardiac: regular rate/rhythm. No murmur, rub, gallop  GI: soft, non distended  Neuro: does not follow commands.  Pupils 5-6, reactive. intermittently moves all extremities  Integument: no appreciable skin breakdown  Vascular: LE warm, well perfused      Medications:    acetaminophen  650 mg Oral or Feeding Tube Q4H     bromocriptine  5 mg Oral BID     cefTRIAXone  2 g Intravenous Q24H     melatonin  10 mg Oral or Feeding Tube QPM     multivitamins w/minerals  15 mL Per Feeding Tube Daily      polyethylene glycol  17 g Oral or Feeding Tube Daily     QUEtiapine  25 mg Oral or Feeding Tube BID     QUEtiapine  50 mg Oral or Feeding Tube QPM     senna-docusate  1 tablet Oral or Feeding Tube At Bedtime     valproate (DEPACON) in NS intermittent infusion  500 mg Intravenous Q8H       dextrose       heparin 1,450 Units/hr (01/20/22 0609)         Labs:   CMP  Recent Labs   Lab 01/20/22  0410 01/20/22  0226 01/19/22  1646 01/19/22  1131 01/19/22  1006 01/19/22  0457 01/18/22  2351 01/18/22  1811 01/18/22  0345 01/18/22  0339 01/17/22  0343 01/17/22  0340     --  144  --   --  142  --  141  --  137   < > 138   POTASSIUM 3.5  --  3.5  --  3.5 3.3*   < > 3.2*  --  3.8   < > 3.6   CHLORIDE 111*  --  112*  --   --  113*  --  110*  --  107   < > 109   CO2 24  --  23  --   --  22  --  22  --  23   < > 19*   ANIONGAP 6  --  9  --   --  7  --  9  --  7   < > 10   *  127*  --  127*  130*   < >  --  120*  114*  --  136*  131*   < > 134*  116*   < > 86   BUN 8  --  9  --   --  10  --  5*  --  7   < > 8   CR 0.56  --  0.60  --   --  0.58  --  0.62  --  0.56   < > 0.59   GFRESTIMATED >90  --  >90  --   --  >90  --  >90  --  >90   < > >90   LUDA 8.0*  --  7.9*  --   --  8.3*  --  8.4*  --  8.4*   < > 8.0*   MAG 1.8  --  1.8  --   --  1.9  --  2.0  --  2.4*   < > 1.7   PHOS  --  3.0  --   --   --  1.3*  --   --   --  2.4*  --  2.6   PROTTOTAL 5.8*  --  5.8*  --   --  6.0*  --  6.5*  --  5.9*   < > 5.2*   ALBUMIN 2.3*  --  2.2*  --   --  2.4*  --  2.6*  --  2.4*   < > 2.2*   BILITOTAL 0.2  --  0.2  --   --  0.4  --  0.4  --  0.4   < > 0.3   ALKPHOS 45  --  45  --   --  48  --  50  --  46   < > 35*   AST 76*  --  86*  --   --  90*  --  108*  --  113*   < > 74*   ALT 67*  --  73*  --   --  85*  --  98*  --  107*   < > 132*    < > = values in this interval not displayed.     CBC  Recent Labs   Lab 01/20/22  0410 01/19/22  1646 01/19/22  0457 01/18/22  1811   WBC 7.6 6.9 7.4 9.3   RBC 2.43* 2.44* 2.66* 2.58*   HGB  7.5* 7.7* 8.3* 8.1*   HCT 22.1* 22.4* 23.8* 22.9*   MCV 91 92 90 89   MCH 30.9 31.6 31.2 31.4   MCHC 33.9 34.4 34.9 35.4   RDW 12.9 12.6 12.5 12.5    154 150 131*     Arterial Blood Gas  Recent Labs   Lab 01/18/22  0339 01/17/22  2141 01/17/22  1657 01/17/22  1547   PH 7.45 7.44 7.43 7.47*   PCO2 33* 34* 31* 28*   PO2 81 107* 125* 77*   HCO3 23 23 21 21   O2PER 1 2 4 30         Pertinent Imaging Studies:  Echo 1/16/2022:   Left ventricular function is normal.The ejection fraction is 60-65%.  Flattened septum is consistent with right ventricular pressure and volume overload.  Mild right ventricular dilation is present. Global right ventricular function is moderately reduced.  Mild tricuspid insufficiency is present.  Estimated pulmonary artery systolic pressure is 26 mmHg plus right atrial pressure.  Patient is on the ventilator, mean RA pressure likely low given the normal IVC size with respirophasic variability.  No pericardial effusion is present.      Yamel Baum PA-C  Critical Care Cardiology  Pager: [phone]

## 2022-01-20 NOTE — PROGRESS NOTES
[facility]  Palliative Care Daily Progress Note       Recommendations & Counseling     Pt seen and examined. Chart notes reviewed. Status discussed with primary CSI team and ICU RN.  Palliative Care will continue to follow for support and attend IDT/family conference today.  Goals are shifting with decline in clinical status and emerging poor prognosis given imaging and EEG findings. Offered empathetic listening and dialogue with spouse.   Will have PC counseling &  staff for support.  CODE STATUS: changed in last 12 hours to DNR/DNI per notes.  After emotional care conference today, patient's spouse Tao chose comfort care plan going forward.  Family members on the phone verbalized loving support.      Assessments          Kimmy Gerardo is a 41 year old female with h/o recent ventral hernia s/p repair 1/13/22 at OSH and discharge home for further convalescence, admitted [facility] ED via EMS 1/15/2022 s/p out-of-hospital PEA cardiac arrest. She was found by her  in bathroom to be unresponsive-->he performed bystander CPR--> ROSC in the field with a couple of intermittent PEA arrests on arrival. Work up --> CT showed large saddle PE admitted for IR thrombectomy with [facility] ECMO back up-she did not need ECMO. She was extubated 1/17/22 to some wakefulness- subsequent decline in mentation. EEG now with ongoing full seizure related changes - a herald of poor prognosis, earlier described as irritable per NCC. Anoxic changes per MRI imaging.     Today, 11/20 the patient was seen for care conference as below in setting of post cardiac arrest and likely anoxic brain injury.     Prognosis, Goals, or Advance Care Planning was addressed today with: Yes.  Mood, coping, and/or meaning in the context of serious illness were addressed today: Yes.  Summary/Comments:   Silver KENNEY NP ACHPN  Nurse Practitioner- Lead Advanced Practice Provider  [facility] Palliative  Medicine Consult Service   [phone]  TT spent: 95 minutes of which 75 minutes were spent in direct face to face contact with patient/family (care conference and separately for physical exam and meeting second time with family). Greater than 50% of time spent counseling and/or coordinating care.        Interval History:     Chart review/discussion with unit or clinical team members:   24hr events: Seizures repetitively overnight and this morning with some EEG changes- reviewed by NCC, no improvement in mental status. Persistent fever despite scheduled tylenol    Per patient or family/caregivers today:  Care conference 11/20  IN: 1345   OUT: 1450  Family present: Spouse -Tao- in person.  Many friends, family and pt parents via phone   Staff present:   Neuro critical care MD cardiology PAInezC, palliative care NP and  unit RNCC and  and bedside ICU RN.  Following introductions cardiology team outlined clinical course to date and significant decline in status over the past 24 hours.  Neuro critical care attending physician outlined significant brain injury due to anoxic insult and with ongoing seizures (poorly controlled with multiple agents) portending a very poor prognosis.  Patient's spouse informed family members on the phone that he was moving Kimmy's care to a comfort plan expecting that she was not going to survive this hospitalization.  Staff members present offered empathetic listening and support the family as they grieved the loss of their loved one.  Comfort care plan discussed. Patient's otherwise healthy status will likely lead to prolonged care needs, perhaps measured in weeks.  Discussed likely probability of placement outside the intensive care unit, and perhaps the need for discharge to a hospice facility for ongoing care.  Key Palliative Symptoms:  We are not helping to manage these symptoms currently in this patient.    Patient is on opioids: bowels not assessed today.            Review of Systems:     A ROS was unable to be obtained 2/2 critically ill status post cardiac arrest.          Medications:     I have reviewed this patient's medication profile and medications during this hospitalization.           Physical Exam:   Vitals were reviewed  Temp: (!) 100.9  F (38.3  C) Temp src: Bladder BP: 139/75 Pulse: 94   Resp: 25 SpO2: 96 % O2 Device: None (Room air)    General: Adult female patient, minimally responsive, supine- lying in bed, NAD, EEG surveillance ongoing.  HEENT:features appear symmetric. 02 via NC. NG tube L nares.   Cardiac: Tele  Pulm: No SOB, pattern regular, unlabored, expansion symmetric, no retractions or use of accessory muscles, extubate  Abdomen: Soft, bowel sounds present  Extremities: Warm, no edema  Skin: No rash or lesion   Neuro:Not following commands or speaking.  Has cough reflex.  Moves all extremities to noxious stim.  Tonic clonic seizure activity noted.           Data Reviewed:   MRI brain 1/19/22-Impression: T2 hyperintense signal involving the caudate nuclei, and  putamina along with mild reduced diffusion is suggestive of anoxic brain injury. Questionable involvement of the parietal cortex bilaterally  ROUTINE LABS (Last four results)  BMPRecent Labs   Lab 01/20/22  0410 01/19/22  1646 01/19/22  1131 01/19/22  1006 01/19/22  0457 01/18/22  2351 01/18/22  1811    144  --   --  142  --  141   POTASSIUM 3.5 3.5  --  3.5 3.3*   < > 3.2*   CHLORIDE 111* 112*  --   --  113*  --  110*   LUDA 8.0* 7.9*  --   --  8.3*  --  8.4*   CO2 24 23  --   --  22  --  22   BUN 8 9  --   --  10  --  5*   CR 0.56 0.60  --   --  0.58  --  0.62   *  127* 127*  130*   < >  --  120*  114*  --  136*  131*    < > = values in this interval not displayed.     CBC  Recent Labs   Lab 01/20/22  0410 01/19/22  1646 01/19/22  0457 01/18/22  1811   WBC 7.6 6.9 7.4 9.3   RBC 2.43* 2.44* 2.66* 2.58*   HGB 7.5* 7.7* 8.3* 8.1*   HCT 22.1* 22.4* 23.8* 22.9*   MCV 91 92 90 89    MCH 30.9 31.6 31.2 31.4   MCHC 33.9 34.4 34.9 35.4   RDW 12.9 12.6 12.5 12.5    154 150 131*     INR  Recent Labs   Lab 01/20/22  0410 01/19/22  0457 01/18/22  0339 01/17/22  0340   INR 1.26* 1.23* 1.40* 1.64*

## 2022-01-20 NOTE — PROGRESS NOTES
"[facility]: [place]  NeuroCritical Care Progress Note    Patient Name:  Kimmy Gerardo  MRN:  [id number]    :  1980    24hr events: Seizure like event this morning with some EEG correlate, no change in mental status. Persistent fever despite scheduled tylenol    A/P:   Kimmy Gerardo is a 41 year old admitted on 1/15/2022 following a home cardiac arrest 2/2 large saddle pulmonary embolism s/p mechanical thrombectomy.  Initial CT head without acute intracranial pathology.      MRI obtained and is concerning for anoxic brain injury. EEG with seizure activity this morning. Broken with Ativan easily.     NEURO RECS  - Continue vEEG   - Advise slow correction of any high Sodiums if needed  - Schedule Ativan 2mg Q6 hrs, stop valproic acid  - We will continue to follow and monitor their EEG and neurologic exam, please call #[phone] with any questions  - with addition of seizures on top of anoxic injury concern here is now toward a poor prognosis and exam is coming in line with that of the findings on eeg and MRI  - discussions regarding transition to comfort measures today      Physical Examination:   BP (!) 148/86   Pulse 117   Temp (!) 100.9  F (38.3  C)   Resp (!) 31   Ht 1.676 m (5' 6\")   Wt 51 kg (112 lb 7 oz)   SpO2 95%   BMI 18.15 kg/m    General: Adult female patient, lying in bed, NAD  HEENT: ETT  Cardiac: NSR on tele  Pulm: No SOB, pattern regular, unlabored, expansion symmetric, no retractions or use of accessory muscles, frequent coughing  Abdomen: Soft, bowel sounds present  Extremities: Warm, no edema  Skin: No rash or lesion     Neuro:  Not following commands or speaking. Increased tone in LUE, intermittent shaking of LUE with associated EEG findings    I have personally reviewed all labs and imaging for this patient.    Eduardo Ulrich MD  Neurocritical Care  Vascular Neurology  Text Page - [phone]  Date of Service: 2022    Neurocritical care time:  I " spent 42 minutes bedside and on the inpatient unit today managing the neurocritical care of Kimmy QUICK Veneciarocio in relation to the issues listed in this note independent of procedures performed this day.

## 2022-01-21 NOTE — PLAN OF CARE
Cared for pt [id number].    Major Shift Events: No seizure-like activity noted. No prn ativan needed. Dilaudid given q1-2 hours for comfort. Robinul givenx2 for increase in secretions. NG removed. Emotional support provided to family.     Plan: Continue comfort cares.

## 2022-01-21 NOTE — PLAN OF CARE
Major Shift Events:  Comfort cares. Scheduled ativan and tylenol given per orders. Ativan and dilaudid given PRN for breakthrough discomfort. Robinul given once for increased secretions. Repositioning every two hours.     Plan: Continue with plan of care to keep patient comfortable.     For vital signs and complete assessments, please see documentation flowsheets.

## 2022-01-21 NOTE — PROGRESS NOTES
[facility]   Critical Care Cardiology - Progress Note    Kimmy Gerardo MRN: [id number]  Age: 41 year old, : 1980  Date: 2022    Assessment and Plan:  Kimmy Gerardo is a 41 year old female with PMHx of ventral hernia s/p repair who presents after out of hospital cardiac arrest with ROSC 2/2 PE.       Per report, patient underwent recent ventral hernia 22 repair at [facility].  1/15 am,  noted patient to be pale and clammy after using bathroom and then became unresponsive. He started CPR and EMS was called, arriving shortly thereafter. Initial rhythm not shockable. She did achieve ROSC but did arrest two additional times en route to hospital and had ongoing CPR on arrival to [facility] ED. She was intubated and taken to CT scanner which revealed large saddle PE with concern for right heart strain  She was transferred to [facility] where she was taken urgently to IR suite for pulmonary angiogram with thrombectomy. Pre thrombectomy PA pressures  50/26 (35) and post thrombectomy 35/15 (26). She was resumed on IV Heparin and transferred to Cardiology ICU for continued cares.       CODE STATUS: DNR/DNI    Today's Plan:  - comfort care    Neurology   # Anoxic brain injury   # Delirium   S/p therapeutic cooling. CTH revealed no acute intracranial pathology. vEEG without seizures, although irritable.  Started on valproic acid  per Neurocrit. Qtc 460ms with addition of Seroquel. MRI with extensive anoxic injury .  - continue scheduled ativan, valproic, keppra for seizure management     Cardiovascular  # OOHCA with ROSC 2/2 PE  # Moderate RV Dysfunction secondary to PE  # Tachycardia, Sinus- resolved  History as above. Echo 22 revealed LVEF 65%, flattened septum/right ventricular pressure/volume elevated, mild RV  Dilation, global right ventricular function moderately reduced. Troponin initial 57-->peaked at 3548--> down trending  - stop monitoring  vitals     Pulmonary  # Massive saddle PE s/p mechanical thrombectomy  # Probable aspiration pneumonia  # Acute hypoxemic respiratory failure requiring intubation, resolved  Pre thrombectomy PA pressures  50/26 (35) and post thrombectomy 35/15 (26). Extubated 1/17/22. Now on RA. Heparin discontinue 1/20  - stop monitoring     Infectious Disease  # Aspiration Pneumonia  # Leukocytosis in setting of arrest/PE  - no longer monitoring     Renal, Electrolytes  # Hypoalbuminemia   Lactic acidosis resolved, now 0.6. Creatinine 0.58. Making urine  - no longer monitoring        Gastrointestinal, Nutrition  # Shock liver 2/2 cardiac arrest  # Underweight  # Gallbladder edema   - no longer monitoring    Hematology  # Thrombocytopenia  # Saddle PE  # Anemia, acute blood loss and critical illness  - no longer monitoring     Endocrinology  # Hyperglycemia in the setting critical illness  No known medical history. Hgb a1c 5.4%     Integumentary:  No skin issues    Pertinent Lines  N/A    ICU Cares:  N/A    Family Update: , update by me    Time Spent on this Encounter   Kimmy was seen and evaluated by me on 01/20/22.  She was in critical condition as the result of anoxic brain injury/cardiac arrest.    Her condition is now Poor.      The acute issues managed by me today include  Anoxic brain injury   Supportive interventions provided and/or ordered by me include: labs, vitals, family care conference totaling approximately 50 minutes of time     Total Critical Care time spent by me, excluding procedures, was ~50 minutes    Patient seen and discussed with Dr. Dede Kaiser.  Assessment and plan as above.    Yamel Baum PA-C  Critical Care Cardiology  Pager: [phone]      Interval History:  Comfort care afternoon 1/20      Objective Findings:  Temp:  [100.6  F (38.1  C)-101.7  F (38.7  C)] 101.7  F (38.7  C)  Pulse:  [] 106  Resp:  [21-42] 24  BP: (126-144)/(72-83) 144/81  SpO2:  [89 %-96 %] 92  %      Medications:    acetaminophen  650 mg Oral or Feeding Tube Q4H     bromocriptine  5 mg Oral BID     levETIRAcetam  2,000 mg Intravenous Q12H     LORazepam  2 mg Intravenous Q4H     valproate (DEPACON) in NS intermittent infusion  750 mg Intravenous Q8H         Labs:   CMP  Recent Labs   Lab 01/20/22  0410 01/20/22  0226 01/19/22  1646 01/19/22  1131 01/19/22  1006 01/19/22  0457 01/18/22  2351 01/18/22  1811 01/18/22  0345 01/18/22  0339 01/17/22  0343 01/17/22  0340     --  144  --   --  142  --  141  --  137   < > 138   POTASSIUM 3.5  --  3.5  --  3.5 3.3*   < > 3.2*  --  3.8   < > 3.6   CHLORIDE 111*  --  112*  --   --  113*  --  110*  --  107   < > 109   CO2 24  --  23  --   --  22  --  22  --  23   < > 19*   ANIONGAP 6  --  9  --   --  7  --  9  --  7   < > 10   *  127*  --  127*  130*   < >  --  120*  114*  --  136*  131*   < > 134*  116*   < > 86   BUN 8  --  9  --   --  10  --  5*  --  7   < > 8   CR 0.56  --  0.60  --   --  0.58  --  0.62  --  0.56   < > 0.59   GFRESTIMATED >90  --  >90  --   --  >90  --  >90  --  >90   < > >90   LUDA 8.0*  --  7.9*  --   --  8.3*  --  8.4*  --  8.4*   < > 8.0*   MAG 1.8  --  1.8  --   --  1.9  --  2.0  --  2.4*   < > 1.7   PHOS  --  3.0  --   --   --  1.3*  --   --   --  2.4*  --  2.6   PROTTOTAL 5.8*  --  5.8*  --   --  6.0*  --  6.5*  --  5.9*   < > 5.2*   ALBUMIN 2.3*  --  2.2*  --   --  2.4*  --  2.6*  --  2.4*   < > 2.2*   BILITOTAL 0.2  --  0.2  --   --  0.4  --  0.4  --  0.4   < > 0.3   ALKPHOS 45  --  45  --   --  48  --  50  --  46   < > 35*   AST 76*  --  86*  --   --  90*  --  108*  --  113*   < > 74*   ALT 67*  --  73*  --   --  85*  --  98*  --  107*   < > 132*    < > = values in this interval not displayed.     CBC  Recent Labs   Lab 01/20/22  0410 01/19/22  1646 01/19/22  0457 01/18/22  1811   WBC 7.6 6.9 7.4 9.3   RBC 2.43* 2.44* 2.66* 2.58*   HGB 7.5* 7.7* 8.3* 8.1*   HCT 22.1* 22.4* 23.8* 22.9*   MCV 91 92 90 89   MCH 30.9 31.6  31.2 31.4   MCHC 33.9 34.4 34.9 35.4   RDW 12.9 12.6 12.5 12.5    154 150 131*     Arterial Blood Gas  Recent Labs   Lab 01/18/22  0339 01/17/22  2141 01/17/22  1657 01/17/22  1547   PH 7.45 7.44 7.43 7.47*   PCO2 33* 34* 31* 28*   PO2 81 107* 125* 77*   HCO3 23 23 21 21   O2PER 1 2 4 30         Pertinent Imaging Studies:  Echo 1/16/2022:   Left ventricular function is normal.The ejection fraction is 60-65%.  Flattened septum is consistent with right ventricular pressure and volume overload.  Mild right ventricular dilation is present. Global right ventricular function is moderately reduced.  Mild tricuspid insufficiency is present.  Estimated pulmonary artery systolic pressure is 26 mmHg plus right atrial pressure.  Patient is on the ventilator, mean RA pressure likely low given the normal IVC size with respirophasic variability.  No pericardial effusion is present.      Yamel Baum PA-C  Critical Care Cardiology  Pager: [phone]

## 2022-01-21 NOTE — PROGRESS NOTES
"SPIRITUAL HEALTH SERVICES  PALLIATIVE SPIRITUAL ASSESSMENT   [facility] 4E    PRIMARY FOCUS:   Goals of care  Emotional distress  Support for coping    Care conference with patient Kimmy Gerardo's family:  Tao in person and numerous family and friends by phone (friends Milad, Elieser, Maddie, and Milad; parents Dotty and Vito, sister Kamilla, aunt Aga, uncle Vito, and step-daughters Ramona and Kamilla). Visit following with Tao at Kimmy's bedside.     Distress: Family received information regarding Kimmy's neurologic condition and are certain she would wish to transition to comfort measures only. They were hopeful that she would be able to die quickly and peacefully, and also named some distress that she might live several days after transitioning to comfort measures only. At the same time, they are grieving deeply as they anticipate her death.    Coping/Meaning Making: Worship marlen (although differing denominations) is a source of meaning for them. Tao shared the story of his mother dying from a brain aneurysm when he was a senior in high school and said he takes comfort from the belief that God \"takes people young when they've been so good\" that they belong in heaven rather than here. Family and friends shared many stories of Kimmy's esther and adventurousness.     Tao requested end of life prayers at bedside, in which he joined.    Intervention: Reviewed documentation. Offered spiritual and emotional support through listening presence, life review, meaning-based exploration of grief, and end of life ritual.    PLAN: I will follow for spiritual support while Palliative Care is consulted.    Triny Suarez  Palliative Care Consult Service   Pager [phone]  [facility] Inpatient Team Consult pager [phone] (M-F 8-4:30)  After-hours Answering Service [phone]       "

## 2022-01-21 NOTE — PROGRESS NOTES
[facility]  Palliative Care Daily Progress Note       Recommendations/discussion & Counseling     Pt seen and examined. Chart notes reviewed. Status discussed with primary CSI team and ICU RN. Pt spouse [Tao] present at bedside.   Palliative Care attended IDT/family conference 1/20/22- see notes..  Goals shifted with decline in clinical status and catastrophic poor prognosis given worsening seizures, imaging and EEG findings-->to comfort care and end of life support d/t underlying severe anoxic cortical injury.  PC team continues to follow for empathetic listening and dialogue with family/spouse.   CODE STATUS: changed in last 12 hours to DNR/DNI/CMO per notes.      Assessments          Kimmy Gerardo is a 41 year old female with h/o recent ventral hernia s/p repair 1/13/22 at OSH and discharge home for further convalescence, admitted [facility] ED via EMS 1/15/2022 s/p out-of-hospital PEA cardiac arrest. She was found by her  in bathroom to be unresponsive-->he performed bystander CPR-->ROSC in the field with a couple of intermittent PEA arrests on arrival. Work up -->CT showed large saddle PE admitted for IR thrombectomy with VA ECMO back up-she did not need ECMO. She was extubated 1/17/22 to some wakefulness- subsequent decline in mentation. EEG now with ongoing full seizure related changes - a herald of poor prognosis, earlier described as irritable per NCC. Anoxic changes per MRI imaging.     Today, 11/21 the patient was seen for ongoing support in setting of post cardiac arrest and catastrophic anoxic brain injury.     Prognosis, Goals, or Advance Care Planning was addressed today with: Yes.  Mood, coping, and/or meaning in the context of serious illness were addressed today: Yes.  Summary/Comments: see above.   Silver KENNEY NP ACHPN  Nurse Practitioner- Lead Advanced Practice Provider  [facility] Palliative Medicine Consult Service    [phone]  TT spent: 55 minutes of which 45 minutes were spent in direct face to face contact with patient/family  Greater than 50% of time spent counseling and/or coordinating care.        Interval History:     Chart review/discussion with unit or clinical team members:   24hr events: Seizures lessened with numerous agents, ongoing EEG changes- now on comfort cares so EEG discontinued- followed closely by NCC, no improvement in mentation. Persistent fevers despite scheduled tylenol- likely neurogenic fever response vs infection (aspiration)    Per patient or family/caregivers :  Care conference 11/20 -Patient's spouse informed family members on the phone that he was moving Kimmy's care to a comfort plan expecting that she was not going to survive this hospitalization.  Staff members present offered empathetic listening and support the family as they grieved the loss of their loved one.  Comfort care plan discussed. Patient's otherwise healthy status may lead to prolonged care needs, perhaps measured in weeks.  Discussed likely probability of placement outside the intensive care unit, and perhaps the need for discharge to a hospice facility for ongoing care.  Key Palliative Symptoms:  We are not helping to manage these symptoms currently in this patient.    Patient is on opioids: bowels not assessed today.           Review of Systems:     A ROS was unable to be obtained 2/2 critically ill status post cardiac arrest.          Medications:     I have reviewed this patient's medication profile and medications during this hospitalization.           Physical Exam:   Vitals were reviewed  Temp: (!) 101.7  F (38.7  C) Temp src: Bladder BP: (!) 144/81 Pulse: 98   Resp: 18 SpO2: 96 % O2 Device: Nasal cannula Oxygen Delivery: 2 LPM  General: Adult female patient, minimally responsive, supine- lying in bed, NAD, EEG surveillance and monitoring discontinued in CMO plan.  HEENT:features appear symmetric. 02 via NC. NG tube  L nares.   Cardiac: Tele  Pulm: No SOB, pattern regular, unlabored, expansion symmetric, no retractions or use of accessory muscles, extubate  Abdomen: Soft, bowel sounds present  Extremities: Warm, no edema  Skin: No rash or lesion   Neuro:Not following commands or speaking.  Has cough reflex. No persistent tonic clonic seizure activity noted.           Data Reviewed:   MRI brain 1/19/22-Impression: T2 hyperintense signal involving the caudate nuclei, and  putamina along with mild reduced diffusion is suggestive of anoxic brain injury. Questionable involvement of the parietal cortex bilaterally  ROUTINE LABS (Last four results)  BMP  Recent Labs   Lab 01/20/22  0410 01/19/22  1646 01/19/22  1131 01/19/22  1006 01/19/22  0457 01/18/22  2351 01/18/22  1811    144  --   --  142  --  141   POTASSIUM 3.5 3.5  --  3.5 3.3*   < > 3.2*   CHLORIDE 111* 112*  --   --  113*  --  110*   LUDA 8.0* 7.9*  --   --  8.3*  --  8.4*   CO2 24 23  --   --  22  --  22   BUN 8 9  --   --  10  --  5*   CR 0.56 0.60  --   --  0.58  --  0.62   *  127* 127*  130*   < >  --  120*  114*  --  136*  131*    < > = values in this interval not displayed.     CBC  Recent Labs   Lab 01/20/22  0410 01/19/22  1646 01/19/22  0457 01/18/22  1811   WBC 7.6 6.9 7.4 9.3   RBC 2.43* 2.44* 2.66* 2.58*   HGB 7.5* 7.7* 8.3* 8.1*   HCT 22.1* 22.4* 23.8* 22.9*   MCV 91 92 90 89   MCH 30.9 31.6 31.2 31.4   MCHC 33.9 34.4 34.9 35.4   RDW 12.9 12.6 12.5 12.5    154 150 131*     INR  Recent Labs   Lab 01/20/22  0410 01/19/22  0457 01/18/22  0339 01/17/22  0340   INR 1.26* 1.23* 1.40* 1.64*

## 2022-01-21 NOTE — PROGRESS NOTES
SPIRITUAL HEALTH SERVICES  PALLIATIVE SPIRITUAL ASSESSMENT   [facility] ([place] 4E    PRIMARY FOCUS:     Emotional distress    Support for coping    Visit with patient Kimmy Gerardo's family at bedside:  Tao and parents Imelda and Vito. Family are spending time with Kimmy, offering love and reassurance and sharing stories of her life and character.    Distress: Family are grieving as they anticipate her death, which they are expressing in a variety of ways.     Coping/Meaning Making: Family are mutually supportive in their grief. They are focused on how best to support her daughter Jacqueline and her step-daughters Kamilla and Ramona. For  Tao, as well as parents, their [employer]ism belief in Kimmy being in Heaven with God and other family is an important source of meaning. We made heartbeats in a bottle, which family plan to keep.    Intervention: Reviewed documentation. Offered spiritual and emotional support through listening presence, facilitation of life review, meaning-based exploration of distress, and making transition objects.    PLAN: I will follow for spiritual support while Palliative Care is consulted.    Triny Suarez  Palliative Care Consult Service   Pager [phone]  [facility] Inpatient Team Consult pager [phone] (M-F 8-4:30)  After-hours Answering Service [phone]

## 2022-01-21 NOTE — PROGRESS NOTES
Major Shift Events: Patient transitioned to comfort cares this afternoon after family care conference. Seizure activity noted this morning & throughout shift. Scheduled & PRN Ativan given in addition to starting Keppra & Valproate. Vitals stable. Remains on room air. Patient resting comfortably in bed.  updated on plan of care, much emotional support given.    Plan: Continue comfort cares  For vital signs and complete assessments, please see documentation flowsheets.

## 2022-01-22 NOTE — PLAN OF CARE
Major Shift Events:  No seizure activity observed. Prn dilaudid, atropine, and robinul given for comfort. Scheduled ativan also given. Pt appears comfortable. 2L NC on pt for comfort.  at bedside throughout night, assisting with cares.     Plan: Continue with comfort cares.

## 2022-01-22 NOTE — PROGRESS NOTES
[facility]   Critical Care Cardiology - Progress Note    Kimmy Gerardo MRN: [id number]  Age: 41 year old, : 1980  Date: 2022    Assessment and Plan:  Kimmy Gerardo is a 41 year old female with PMHx of ventral hernia s/p repair who presents after out of hospital cardiac arrest with ROSC 2/2 PE.       Per report, patient underwent recent ventral hernia 22 repair at [facility].  1/15 am,  noted patient to be pale and clammy after using bathroom and then became unresponsive. He started CPR and EMS was called, arriving shortly thereafter. Initial rhythm not shockable. She did achieve ROSC but did arrest two additional times en route to hospital and had ongoing CPR on arrival to [facility] ED. She was intubated and taken to CT scanner which revealed large saddle PE with concern for right heart strain  She was transferred to [facility] where she was taken urgently to IR suite for pulmonary angiogram with thrombectomy. Pre thrombectomy PA pressures  50/26 (35) and post thrombectomy 35/15 (26). She was resumed on IV Heparin and transferred to Cardiology ICU for continued cares.       CODE STATUS: DNR/DNI    Today's Plan:  - Continue with comfort care    Neurology   # Anoxic brain injury   # Delirium   S/p therapeutic cooling. CTH revealed no acute intracranial pathology. vEEG without seizures, although irritable.  Started on valproic acid  per Neurocrit. Qtc 460ms with addition of Seroquel. MRI with extensive anoxic injury .  - Continue scheduled ativan, valproic, keppra for seizure management     Cardiovascular  # OOHCA with ROSC 2/2 PE  # Moderate RV Dysfunction secondary to PE  # Tachycardia, Sinus- resolved  History as above. Echo 22 revealed LVEF 65%, flattened septum/right ventricular pressure/volume elevated, mild RV  Dilation, global right ventricular function moderately reduced. Troponin initial 57-->peaked at 3548--> down trending  -  Stop monitoring vitals     Pulmonary  # Massive saddle PE s/p mechanical thrombectomy  # Probable aspiration pneumonia  # Acute hypoxemic respiratory failure requiring intubation, resolved  Pre thrombectomy PA pressures  50/26 (35) and post thrombectomy 35/15 (26). Extubated 1/17/22. Now on RA. Heparin discontinue 1/20  - Stop monitoring     Infectious Disease  # Aspiration Pneumonia  # Leukocytosis in setting of arrest/PE  - No longer monitoring     Renal, Electrolytes  # Hypoalbuminemia   Lactic acidosis resolved, now 0.6. Creatinine 0.58. Making urine  - No longer monitoring        Gastrointestinal, Nutrition  # Shock liver 2/2 cardiac arrest  # Underweight  # Gallbladder edema   - No longer monitoring    Hematology  # Thrombocytopenia  # Saddle PE  # Anemia, acute blood loss and critical illness  - No longer monitoring     Endocrinology  # Hyperglycemia in the setting critical illness  No known medical history. Hgb a1c 5.4%     Integumentary:  No skin issues    Pertinent Lines  N/A    ICU Cares:  N/A    Family Update: , update by me    Patient seen and discussed with Dr. Dede Michelle.  Assessment and plan as above.    Jonas Kirkland, [facility]  Cardiology Fellow    Interval History:  Comfort care afternoon 1/20. Transferred to floor. Appears comfortable and in no distress.       Objective Findings:  Resp:  [18] 18   No other vitals as patient is on comfort cares.         Medications:    acetaminophen  650 mg Oral or Feeding Tube Q4H     bromocriptine  5 mg Oral BID     levETIRAcetam  2,000 mg Intravenous Q12H     LORazepam  2 mg Intravenous Q4H     valproate (DEPACON) in NS intermittent infusion  750 mg Intravenous Q8H         Labs:   No labs as patient is on comfort cares.

## 2022-01-22 NOTE — PLAN OF CARE
and parents at bedside. Scheduled antiepileptics and prn medications continued for comfort. Patient repositioned for comfort.

## 2022-01-23 NOTE — PROVIDER NOTIFICATION
[place] 7A, Room 7207, LIZZETTE Gerardo.    Pt's  is wondering about IV antipyretics to help bring down her fever.  seems distressed about it.    Juana PHILLIPS RN, [phone]

## 2022-01-23 NOTE — PROGRESS NOTES
[facility]  Palliative Care Daily Progress Note       Recommendations & Counseling       She is having intractable neuromuscular irritability: multifocal myoclonus related to severe anoxic injury; or ronaldo convulsions, or both are possible. This is despite strong doses of Keppra, Valproate, and many prn ativan doses. It is very distressing to loved ones at bedside, understandably so; although I did spend some time with them today framing that her mind/abiltiy are likely long gone from us; it is really unlikely she is actually conscious & suffering; however that being said one cannot know for sure and the standard of care in these situations is to treat signs of discomfort/distress/sources of potential suffering just in case.    To that end I think we should use a versed infusion to try to better control the neuromuscular irritability and I wrote for a 2mg/hr versed infusion given she didn't respond much to a 4mg ativan bolus just given.    2-4mg IV q1h prn versed boluses as well.    I stopped ativan.    Continue other AEDs    I spoke with bedside nurse, charge nurse as well; understandably there is some nervousness on the unit about doing this as it's not a common intervention which I appreciate. Charge spoke with nurse manager too and I appreciate their openness and support of this. I did not write for titration parameters however to reduce the burden on our 7A nursing staff (ie so they are not responsible for making titration decisions for this). Absolutely the versed drip can be increased if she continues to have seizures/trembling/myoclonus; would inccrease it in 2mg/hr increments as often as every 4 h if needed. Ie, up to 4mg/hr next, then 6mg/hr, etc.     We will follow    D/w CSI fellow    Also: consider rectal tylenol given she can't take PO tylenol    45 min on unit over half counseling and coord.   Александр Reyes MD / Palliative Medicine / Text me via  [employer].          Assessments          40 yo with OOH arrest 2/2 PE after hernia repair with severe anoxic brain injury and associated seizures, myoclonus  Receiving comfort-only measures with expectation she'll die in the hospital in the coming few days    Today, the patient was seen for:  Anoxic brain injury  Status epilepticus    Prognosis, Goals, or Advance Care Planning was addressed today with: Yes.  Mood, coping, and/or meaning in the context of serious illness were addressed today: Yes.  Summary/Comments:  present, and friends. D/w them prognosis is uncertain; could die today but also could easily live for several days yet. They are very distressed about the trembling/probable convulsions and we discussed options including versed drip as abvoe;  very supportive of this            Interval History:     Chart review/discussion with unit or clinical team members:   Transitioned to 'comfort measures only' on Friday. Now on 7B.   Ongoing intermittent convulsions seen, getting benzos    Per patient or family/caregivers today:  As above  She is unresponsive    Key Palliative Symptoms:                   Medications:     I have reviewed this patient's medication profile and medications during this hospitalization.    Noted meds:  APAP Q4H PRN not given  atrophine ophth x6 yesterday  Dilaudid 0.5 mg IV prn: 4mg total yesterday  keppra  Ativan prn 2mg: x4 yesterday, IV  valproate           Physical Exam:   Vitals were reviewed              O2 Device: Nasal cannula Oxygen Delivery: 2 LPM  Unresponsive  Resps tachy and slightly labored  Trembling bilat UE  Head tilted to the R  Much LE clonus             Data Reviewed:     Reviewed recent pertinent imaging, comments:   MRI 1/19 c/w AMOS    Reviewed recent labs, comments:   Cr 0.56

## 2022-01-23 NOTE — PLAN OF CARE
"BP (!) 144/81   Pulse 95   Temp (!) 101.7  F (38.7  C)   Resp 18   Ht 1.676 m (5' 6\")   Wt 51 kg (112 lb 7 oz)   SpO2 96%   BMI 18.15 kg/m      Shift: 3937-0748  Isolation Status: NA  VS: NA  Neuro: RAJI. Non communicating post hypoxic brain injury  Behaviors: Unconscious  BG: NA  Respiratory: Secretions collecting in back of throat. Weak cough to remove them. Assisted with oral suction.  Cardiac: WDL  Pain/Nausea/PRN: Pt's  requesting dilaudid and ativan q2h for pt comfort. Atropine drops given PRN.  Diet: NPO.  LDA: 2 PIVs SL. Ramsey catheter in with adequate output.  Infusion(s): NA.  GI/: No intake, no stooling.  Skin: WDL.  Mobility: Bedfast.  Plan: Continue to monitor and support until passing.    "

## 2022-01-23 NOTE — PROGRESS NOTES
[facility]   Critical Care Cardiology - Progress Note    Kimmy Gerardo MRN: [id number]  Age: 41 year old, : 1980    Assessment and Plan:  Kimmy Gerardo is a 41 year old female with PMHx of ventral hernia s/p repair who presents after out of hospital cardiac arrest with ROSC 2/2 PE.       Per report, patient underwent recent ventral hernia 22 repair at [facility].  1/15 am,  noted patient to be pale and clammy after using bathroom and then became unresponsive. He started CPR and EMS was called, arriving shortly thereafter. Initial rhythm not shockable. She did achieve ROSC but did arrest two additional times en route to hospital and had ongoing CPR on arrival to [facility] ED. She was intubated and taken to CT scanner which revealed large saddle PE with concern for right heart strain  She was transferred to [facility] where she was taken urgently to IR suite for pulmonary angiogram with thrombectomy. Pre thrombectomy PA pressures  50/26 (35) and post thrombectomy 35/15 (26). She was resumed on IV Heparin and transferred to Cardiology ICU for continued cares.       CODE STATUS: DNR/DNI    Today's Plan:  - Continue with comfort care.  - Given PRN ativan for seizure activity.     Neurology   # Anoxic brain injury   # Delirium   S/p therapeutic cooling. CTH revealed no acute intracranial pathology. vEEG without seizures, although irritable.  Started on valproic acid  per Neurocrit. Qtc 460ms with addition of Seroquel. MRI with extensive anoxic injury .  - Continue scheduled ativan, valproic, keppra for seizure management     Cardiovascular  # OOHCA with ROSC 2/2 PE  # Moderate RV Dysfunction secondary to PE  # Tachycardia, Sinus- resolved  History as above. Echo 22 revealed LVEF 65%, flattened septum/right ventricular pressure/volume elevated, mild RV  Dilation, global right ventricular function moderately reduced. Troponin initial 57-->peaked at  3548--> down trending  - Stop monitoring vitals     Pulmonary  # Massive saddle PE s/p mechanical thrombectomy  # Probable aspiration pneumonia  # Acute hypoxemic respiratory failure requiring intubation, resolved  Pre thrombectomy PA pressures  50/26 (35) and post thrombectomy 35/15 (26). Extubated 1/17/22. Now on RA. Heparin discontinue 1/20  - Stop monitoring     Infectious Disease  # Aspiration Pneumonia  # Leukocytosis in setting of arrest/PE  - No longer monitoring     Renal, Electrolytes  # Hypoalbuminemia   Lactic acidosis resolved, now 0.6. Creatinine 0.58. Making urine  - No longer monitoring        Gastrointestinal, Nutrition  # Shock liver 2/2 cardiac arrest  # Underweight  # Gallbladder edema   - No longer monitoring    Hematology  # Thrombocytopenia  # Saddle PE  # Anemia, acute blood loss and critical illness  - No longer monitoring     Endocrinology  # Hyperglycemia in the setting critical illness  No known medical history. Hgb a1c 5.4%     Integumentary:  No skin issues    Pertinent Lines  N/A    ICU Cares:  N/A    Family Update: , update by me    Patient seen and discussed with Dr. Dede Michelle.  Assessment and plan as above.    Silver Noyola MD  Cardiology Fellow     Interval History:  Comfort care afternoon 1/20. Transferred to floor. Some seizure activity today in left arm, treated with IV benzo       Objective Findings:      No other vitals as patient is on comfort cares.         Medications:    acetaminophen  650 mg Oral or Feeding Tube Q4H     bromocriptine  5 mg Oral BID     levETIRAcetam  2,000 mg Intravenous Q12H     LORazepam  2 mg Intravenous Q4H     valproate (DEPACON) in NS intermittent infusion  750 mg Intravenous Q8H         Labs:   No labs as patient is on comfort cares.

## 2022-01-24 NOTE — PROGRESS NOTES
CLINICAL NUTRITION SERVICES - BRIEF NOTE     RD following for nutrition support. Pt transitioned to comfort cares and enteral nutrition support discontinued. RD to sign off at this time, will remain available by re-consult.     Willa Sutton RD, LD  [phone]  Pgr: [phone]

## 2022-01-24 NOTE — PROGRESS NOTES
[phone]    Pain/Nausea/PRN: Unable to assess, dilaudid given hourly.   Diet: NPO  LDA: PIV x2  GI: NO BM this shift.   : Voiding via goncalves, minimal output.   Skin: Pale.   Mobility: Bedrest  Plan: Pt on comfort cares. Will continue to monitor and will update provider with changes in condition.

## 2022-01-24 NOTE — PROGRESS NOTES
SPIRITUAL HEALTH SERVICES  [facility]  Unit: 7A    Referral Source: Stat Consult / on-call page     Illness Narrative: Pt's spouse Tao, asked for a prayer to be said with him and his wife Kimmy,    Distress: Nursing staff does not know if Kimmy will live through the night.    Coping: Odilia is important to both Kimmy and Tao. Tao stated that he is at peace and his spirit has lifted as his wife approaches death.    Plan: This was an on-Call visit. I am also the unit  for this unit I will check in on pt in the morning and if she is alive inform the palliative  of her status.         Duane F Bauer  Chaplain Resident  Pager number: [phone]  * [facility] remains available 24/7 for emergent requests/referrals, either by having the switchboard page the on-call  or by entering an ASAP/STAT consult in Epic (this will also page the on-call ).*

## 2022-01-24 NOTE — PROGRESS NOTES
[facility]  Palliative Care Daily Progress Note       Recommendations/discussion & Counseling     Pt seen and examined. Chart notes reviewed. Status discussed with primary team and bedside RN. Pt spouse [Tao] present at bedside.   Seizures and msk irritability being managed with IV bolus and continuous infusion Versed, IV valproate, and Keppra.   Reviewed notes from staff and Dr Reyes over weekend.    Plan Versed infusion at 2 mg per hour with increases in 2mg/hr increments as often as every 4 h if needed. Ie, up to 4mg/hr next, then 6mg/hr, etc.    Consider Lorazepam bolus of 1-2 mg IV for break thru seizure   Continue hydromorphone IV prn for shortness of breath/air hunger.    Palliative Care attended IDT/family conference 1/20/22- see notes and following daily. Goals shifted with decline in clinical status and catastrophic poor prognosis given worsening seizures, imaging and EEG findings-->to comfort care and end of life support d/t underlying severe anoxic cortical injury.  PC team continues to follow for empathetic listening and dialogue with family/spouse.   CODE STATUS: DNR/DNI/CMO per notes.      Assessments          Kimmy Gerardo is a 41 year old female with h/o recent ventral hernia s/p repair 1/13/22 at OSH and discharge home for further convalescence, admitted [facility] ED via EMS 1/15/2022 s/p out-of-hospital PEA cardiac arrest. She was found by her  in bathroom to be unresponsive-->he performed bystander CPR-->ROSC in the field with a couple of intermittent PEA arrests on arrival. Work up -->CT showed large saddle PE admitted for IR thrombectomy with [facility] ECMO back up-she did not need ECMO. She was extubated 1/17/22 to some wakefulness- subsequent decline in mentation. EEG discontinued but showed ongoing full seizure related changes - very poor prognosis, confirmed with anoxic changes per MRI imaging.     Today, 11/24 the patient was seen for  ongoing support in setting of post cardiac arrest and catastrophic anoxic brain injury. End of life cares.    Prognosis, Goals, or Advance Care Planning was addressed today with: Yes.  Mood, coping, and/or meaning in the context of serious illness were addressed today: Yes.  Summary/Comments: see above.   Silver KENNEY NP ACHPN  Nurse Practitioner- Lead Advanced Practice Provider  [facility] Palliative Medicine Consult Service   [phone]  TT spent: 55 minutes of which 45 minutes were spent in direct face to face contact with patient/family  Greater than 50% of time spent counseling and/or coordinating care.        Interval History:     Chart review/discussion with unit or clinical team members:   Weekend events: Seizures lessened with numerous agents,now on comfort cares transferred to floor- Cards/ (csi) continues to follow.-minimally responsive. Persistent fevers despite scheduled tylenol- likely neurogenic fever response  Per patient or family/caregivers :  Comfort care plan ongoing. Patient's otherwise healthy status may lead to prolonged care needs, perhaps measured in days/weeks. Key Palliative Symptoms:  We are not helping to manage these symptoms currently in this patient.    Patient is on opioids: bowels not assessed today.           Review of Systems:     A ROS was unable to be obtained 2/2 critically ill status post cardiac arrest.          Medications:     I have reviewed this patient's medication profile and medications during this hospitalization.           Physical Exam:   Vitals were reviewed                   General: Adult female patient, minimally responsive, supine- lying in bed, NAD, CMO plan. Spouse present.  HEENT:features appear symmetric. 02 via NC.   Cardiac: Tele  Pulm: No SOB, pattern regular, unlabored, expansion symmetric, no retractions or use of accessory muscles  Abdomen: Soft, bowel sounds present  Extremities: Warm, no edema  Skin: No rash or lesion   Neuro:Minimally  responsive. Not following commands or speaking. Has cough reflex. No persistent tonic clonic seizure activity noted.           Data Reviewed:   MRI brain 1/19/22-Impression: T2 hyperintense signal involving the caudate nuclei, and  putamina along with mild reduced diffusion is suggestive of anoxic brain injury. Questionable involvement of the parietal cortex bilaterally  ROUTINE LABS - comfort measures only since 1/21  BMP  Recent Labs   Lab 01/20/22  0410 01/19/22  1646 01/19/22  1131 01/19/22  1006 01/19/22  0457 01/18/22  2351 01/18/22  1811    144  --   --  142  --  141   POTASSIUM 3.5 3.5  --  3.5 3.3*   < > 3.2*   CHLORIDE 111* 112*  --   --  113*  --  110*   LUDA 8.0* 7.9*  --   --  8.3*  --  8.4*   CO2 24 23  --   --  22  --  22   BUN 8 9  --   --  10  --  5*   CR 0.56 0.60  --   --  0.58  --  0.62   *  127* 127*  130*   < >  --  120*  114*  --  136*  131*    < > = values in this interval not displayed.     CBC  Recent Labs   Lab 01/20/22 0410 01/19/22  1646 01/19/22  0457 01/18/22  1811   WBC 7.6 6.9 7.4 9.3   RBC 2.43* 2.44* 2.66* 2.58*   HGB 7.5* 7.7* 8.3* 8.1*   HCT 22.1* 22.4* 23.8* 22.9*   MCV 91 92 90 89   MCH 30.9 31.6 31.2 31.4   MCHC 33.9 34.4 34.9 35.4   RDW 12.9 12.6 12.5 12.5    154 150 131*     INR  Recent Labs   Lab 01/20/22 0410 01/19/22 0457 01/18/22  0339   INR 1.26* 1.23* 1.40*

## 2022-01-24 NOTE — PROGRESS NOTES
[facility]   Critical Care Cardiology - Progress Note    Kimmy Gerardo MRN: [id number]  Age: 41 year old, : 1980    Assessment and Plan:  Kimmy Gerardo is a 41 year old female with PMHx of ventral hernia s/p repair who presents after out of hospital cardiac arrest with ROSC 2/2 PE.       Per report, patient underwent recent ventral hernia 22 repair at [facility].  1/15 am,  noted patient to be pale and clammy after using bathroom and then became unresponsive. He started CPR and EMS was called, arriving shortly thereafter. Initial rhythm not shockable. She did achieve ROSC but did arrest two additional times en route to hospital and had ongoing CPR on arrival to [facility] ED. She was intubated and taken to CT scanner which revealed large saddle PE with concern for right heart strain  She was transferred to [facility] where she was taken urgently to IR suite for pulmonary angiogram with thrombectomy. Pre thrombectomy PA pressures  50/26 (35) and post thrombectomy 35/15 (26). She was resumed on IV Heparin and transferred to Cardiology ICU for continued cares.       CODE STATUS: DNR/DNI    Today's Plan:  - Continue with comfort care.    Neurology   # Anoxic brain injury   # Delirium   S/p therapeutic cooling. CTH revealed no acute intracranial pathology. vEEG without seizures, although irritable.  Started on valproic acid  per Neurocrit. Qtc 460ms with addition of Seroquel. MRI with extensive anoxic injury .  - Continue scheduled ativan, valproic, keppra for seizure management     Cardiovascular  # OOHCA with ROSC 2/2 PE  # Moderate RV Dysfunction secondary to PE  # Tachycardia, Sinus- resolved  History as above. Echo 22 revealed LVEF 65%, flattened septum/right ventricular pressure/volume elevated, mild RV  Dilation, global right ventricular function moderately reduced. Troponin initial 57-->peaked at 3548--> down trending  - Stop monitoring  vitals     Pulmonary  # Massive saddle PE s/p mechanical thrombectomy  # Probable aspiration pneumonia  # Acute hypoxemic respiratory failure requiring intubation, resolved  Pre thrombectomy PA pressures  50/26 (35) and post thrombectomy 35/15 (26). Extubated 1/17/22. Now on RA. Heparin discontinue 1/20  - Stop monitoring     Infectious Disease  # Aspiration Pneumonia  # Leukocytosis in setting of arrest/PE  - No longer monitoring     Renal, Electrolytes  # Hypoalbuminemia   Lactic acidosis resolved, now 0.6. Creatinine 0.58. Making urine  - No longer monitoring        Gastrointestinal, Nutrition  # Shock liver 2/2 cardiac arrest  # Underweight  # Gallbladder edema   - No longer monitoring    Hematology  # Thrombocytopenia  # Saddle PE  # Anemia, acute blood loss and critical illness  - No longer monitoring     Endocrinology  # Hyperglycemia in the setting critical illness  No known medical history. Hgb a1c 5.4%     Integumentary:  No skin issues    Pertinent Lines  N/A    ICU Cares:  N/A    Family Update: , update by me    Interval History:  Comfort care afternoon 1/20. Transferred to floor. Intermittently with some seizure activity. None seen today by me. Appears comfortable.  remains at bedside.    Objective Findings:      No other vitals as patient is on comfort cares.         Medications:    acetaminophen  650 mg Oral or Feeding Tube Q4H     bromocriptine  5 mg Oral BID     levETIRAcetam  2,000 mg Intravenous Q12H     valproate (DEPACON) in NS intermittent infusion  750 mg Intravenous Q8H       midazolam 2 mg/hr (01/23/22 1922)       Labs:   No labs as patient is on comfort cares.

## 2022-01-24 NOTE — PROGRESS NOTES
"SPIRITUAL HEALTH SERVICES  PALLIATIVE SPIRITUAL ASSESSMENT   [facility] 7A    PRIMARY FOCUS:   Emotional/spiritual distress  Support for coping    Visit with patient Kimmy Gerardo's  Tao at bedside.     Distress: Tao is hoping that Kimmy will die peacefully. He wonders whether she is \"hanging on\" for something, and attributes the length of her dying time to her innate strength and stubbornness. He is staying with Kimmy many hours a day; other family have visited and said goodbye but are too saddened to return, per Tao. He, and they, believe that her spirit is already on its way to God ([employer]), and her body is lingering.    Coping/Meaning Making: Tao finds meaning in the time he has shared with Kimmy and in his L[employer]-day marlen. He shared stories of their life together and his commitment to continuing to raise Kimmy's daughter. Tao requested prayer with Kimmy, as well as a psalm.    Intervention: Reviewed documentation. Offered spiritual and emotional support through listening presence, encouraging life review, grief support, list of pediatric grief resources (via email), and prayer.    PLAN: I will follow for spiritual support while Palliative Care is consulted.    Triny Suarez  Palliative Care Consult Service   Pager [phone]  [facility] Inpatient Team Consult pager [phone] (M-F 8-4:30)  After-hours Answering Service [phone]       "

## 2022-01-24 NOTE — PLAN OF CARE
"BP (!) 144/81   Pulse 95   Temp (!) 101.7  F (38.7  C)   Resp 18   Ht 1.676 m (5' 6\")   Wt 51 kg (112 lb 7 oz)   SpO2 96%   BMI 18.15 kg/m      Shift: 3065-9307  Isolation Status: NA  VS: NA - Comfort cares  Neuro: Unconscious and unarousable.  Behaviors: Sedated.  BG: NA  Respiratory: Respirations in upper 20's. Air hungry and gasping for breath.  Cardiac: Tachycardic.  Pain/Nausea/PRN: Dilaudid 0.5 mg given q1h. Versed drip @ 2mg/hr. 2-4 mg of Versed available q1h IV push for tremors.  Diet: NPO  LDA: 2 PIVs infusing. Ramsey catheter in.  Infusion(s): Versed @ 2mg/hr.  GI/: NPO, no BM. 450 donte UOP.  Skin: WDL.  Mobility: Bedfast.  Plan: Continue to provide comfort for patient and her family.    "

## 2022-01-24 NOTE — PLAN OF CARE
Patient on comfort care.  at bed side and rooming with patient.  is very involved in patient's care and advocates for her needs. Patient had large emesis this am. She has also had partial/full body tremors throughout the day. Ativan Dc'd and she was started on continuous midazolam @ 2 mg/hr. She still get q1 hr Dilaudid. All 's questions/concerns answered by care team and nursing.

## 2022-01-24 NOTE — PROVIDER NOTIFICATION
Jones, , Room 7207, LIZZETTE Reynoso.    Pt experiencing air hunger and respirations are in the high 20's. Before I give the next dose of Dilaudid, do you want the order changed to morphine? Thanks.    Juana PHILLIPS, [phone]

## 2022-01-25 NOTE — PLAN OF CARE
Shift: 3204-3463  Isolation Status: NA  VS: NA on RA, 103.1 axillary, checked when patient felt warm  Neuro: Aox0  Behaviors: Unresponsive  BG: NA  Labs: NA  Respiratory: Regular rhythm, rate increased to low 30s by 2100.    Cardiac: regular rhythm, tachycardic  Pain/Nausea/PRN: Unable to assess.  IV Dilaudid given x2 for comfort  Diet: NPO  LDA: PIV x2, Ramsey  Infusion(s): Versed 2mg/hour, Keppra q12h, valproate q8h  GI/: No BM.  Ramsey catheter with 650 mL urine output.  Skin: Pale to slightly cyanotic in extremities  Mobility: Bedrest  Events/Education: NA  Plan: Continue comfort cares

## 2022-01-25 NOTE — PROGRESS NOTES
SPIRITUAL HEALTH SERVICES  [facility]  Unit: 7A    Referral Source: Self, following     Illness Narrative: Visit was mainly with Tao, who is expecting the pt his spouse to die soon.    Distress: Tao is trying to make meaning of Kimmy continuing to hang on to life.    Coping: Prayer is central to both Kimmy and Tao lives and is helping Tao this time of anticipated loss.  Prayer was offered with Kimmy and Tao.    Reminded Tao that another  visit was just a page away, through Kimmy's nurse.    Plan: The palliative chaplains will continue to follow [place], as the unit  on 7A I will also be asking Kimmy's care team relating to current spiritual health services needs.         Duane F Bauer  Chaplain Resident  Pager number: [phone]  * [facility] remains available 24/7 for emergent requests/referrals, either by having the switchboard page the on-call  or by entering an ASAP/STAT consult in Epic (this will also page the on-call ).*

## 2022-01-25 NOTE — PROGRESS NOTES
[facility]  Palliative Care Daily Progress Note       Recommendations/discussion & Counseling     Pt seen and examined. Chart notes reviewed. Status discussed with primary team and bedside RN. Pt spouse [Tao] round the clock present at bedside.   Seizures and msk irritability being managed with IV bolus and continuous infusion Versed, IV valproate, and Keppra.   Reviewed recent notes from staff and Dr Reyes over weekend.    Continue Versed infusion at 2 mg per hour with increases in 2mg/hr increments as often as every 4 h if needed. ie, up to 4mg/hr next, then 6mg/hr, etc.    Consider Lorazepam bolus of 1-2 mg IV for break thru seizure.   Continue hydromorphone IV prn for shortness of breath/air hunger.    Ongoing decline in clinical status and catastrophic poor prognosis given worsening seizures, imaging and EEG findings-->continues comfort care and end of life support d/t underlying severe anoxic cortical injury. Palliative care continues to provide team support and empathetic listening to family.   PC team continues to follow for empathetic listening and dialogue with family/spouse.   CODE STATUS: DNR/DNI/CMO per notes.      Assessments          Kimmy Gerardo is a 41 year old female with h/o recent ventral hernia s/p repair 1/13/22 at OSH and discharge home for further convalescence, admitted [facility] ED via EMS 1/15/2022 s/p out-of-hospital PEA cardiac arrest. She was found by her  in bathroom to be unresponsive-->he performed bystander CPR-->ROSC in the field with a couple of intermittent PEA arrests on arrival. Work up -->CT showed large saddle PE admitted for IR thrombectomy with [facility] ECMO back up-she did not need ECMO. She was extubated 1/17/22 to some wakefulness- subsequent decline in mentation. EEG discontinued but showed ongoing full seizure related changes - very poor prognosis, confirmed with anoxic changes per MRI imaging.     Today, 1/25  the patient was seen for ongoing support in setting of post cardiac arrest and catastrophic anoxic brain injury. End of life cares.    Prognosis, Goals, or Advance Care Planning was addressed today with: Yes.  Mood, coping, and/or meaning in the context of serious illness were addressed today: Yes.  Summary/Comments: see above.   Silver KENNEY NP ACHPN  Nurse Practitioner- Advanced Practice Provider  [facility] Palliative Medicine Consult Service   [phone]  TT spent: 29 minutes of which 19 minutes were spent in direct face to face contact with patient/family  Greater than 50% of time spent counseling and/or coordinating care.        Interval History:     Chart review/discussion with unit or clinical team members:   Weekend events: Seizures lessened with numerous agents, on comfort cares transferred to floor- Cards/ (csi) continues to follow.-minimally responsive. Persistent fevers despite scheduled tylenol- likely neurogenic fever response  Per patient or family/caregivers :  Comfort care plan ongoing. Patient's otherwise healthy status may lead to prolonged care needs, perhaps measured in days/weeks. Key Palliative Symptoms:  We are not helping to manage these symptoms currently in this patient.    Patient is on opioids: bowels not assessed today.           Review of Systems:     A ROS was unable to be obtained 2/2 critically ill status post cardiac arrest.          Medications:     I have reviewed this patient's medication profile and medications during this hospitalization.           Physical Exam:   Vitals were not routinely obtained with comfort care plan.                   General: Adult female patient, minimally responsive, supine- lying in bed, NAD, CMO plan. Spouse present.  HEENT: features appear symmetric.room air. MM mildly dry.   Cardiac: S1 S2 with rate in low 100s at rest. Regular.   Pulm: No SOB, pattern regular, unlabored, expansion symmetric, mild retractions.  Abdomen: Soft, bowel  sounds hypoactive present  Extremities: Warm, no edema  Skin: No rash or lesion   Neuro: Minimally responsive. Not following commands or speaking. Has cough reflex. No tremor or tonic clonic seizure activity noted.           Data Reviewed:   MRI brain 1/19/22-Impression: T2 hyperintense signal involving the caudate nuclei, and  putamina along with mild reduced diffusion is suggestive of anoxic brain injury. Questionable involvement of the parietal cortex bilaterally  ROUTINE LABS - comfort measures only since 1/21  BMP  Recent Labs   Lab 01/20/22  0410 01/19/22  1646 01/19/22  1131 01/19/22  1006 01/19/22  0457 01/18/22  2351 01/18/22  1811    144  --   --  142  --  141   POTASSIUM 3.5 3.5  --  3.5 3.3*   < > 3.2*   CHLORIDE 111* 112*  --   --  113*  --  110*   LUDA 8.0* 7.9*  --   --  8.3*  --  8.4*   CO2 24 23  --   --  22  --  22   BUN 8 9  --   --  10  --  5*   CR 0.56 0.60  --   --  0.58  --  0.62   *  127* 127*  130*   < >  --  120*  114*  --  136*  131*    < > = values in this interval not displayed.     CBC  Recent Labs   Lab 01/20/22 0410 01/19/22  1646 01/19/22  0457 01/18/22  1811   WBC 7.6 6.9 7.4 9.3   RBC 2.43* 2.44* 2.66* 2.58*   HGB 7.5* 7.7* 8.3* 8.1*   HCT 22.1* 22.4* 23.8* 22.9*   MCV 91 92 90 89   MCH 30.9 31.6 31.2 31.4   MCHC 33.9 34.4 34.9 35.4   RDW 12.9 12.6 12.5 12.5    154 150 131*     INR  Recent Labs   Lab 01/20/22 0410 01/19/22  0457   INR 1.26* 1.23*

## 2022-01-25 NOTE — PROGRESS NOTES
[facility]   Critical Care Cardiology - Progress Note    Kimmy Gerardo MRN: [id number]  Age: 41 year old, : 1980    Assessment and Plan:  Kimmy Gerardo is a 41 year old female with PMHx of ventral hernia s/p repair who presents after out of hospital cardiac arrest with ROSC 2/2 PE.       Per report, patient underwent recent ventral hernia 22 repair at [facility].  1/15 am,  noted patient to be pale and clammy after using bathroom and then became unresponsive. He started CPR and EMS was called, arriving shortly thereafter. Initial rhythm not shockable. She did achieve ROSC but did arrest two additional times en route to hospital and had ongoing CPR on arrival to [facility] ED. She was intubated and taken to CT scanner which revealed large saddle PE with concern for right heart strain  She was transferred to [facility] where she was taken urgently to IR suite for pulmonary angiogram with thrombectomy. Pre thrombectomy PA pressures  50/26 (35) and post thrombectomy 35/15 (26). She was resumed on IV Heparin and transferred to Cardiology ICU for continued cares.  Transitioned to comfort care after poor neuro prognosis and anoxic brain injury.      CODE STATUS: DNR/DNI    Today's Plan:  - Continue with comfort care. Discussed w  possibility of transitioning to hospice if patient does not pass in the next day or two, however, family believes this may be harder on patients loved ones and prefers continuing to allow her to pass here in the hospital.     Neurology   # Anoxic brain injury   # Delirium   S/p therapeutic cooling. CTH revealed no acute intracranial pathology. vEEG without seizures, although irritable.  Started on valproic acid  per Neurocrit. Qtc 460ms with addition of Seroquel. MRI with extensive anoxic injury .  - Continue scheduled ativan, valproic, keppra for seizure management     Cardiovascular  # OOHCA with ROSC 2/2 PE  #  Moderate RV Dysfunction secondary to PE  # Tachycardia, Sinus  History as above. Echo 1/16/22 revealed LVEF 65%, flattened septum/right ventricular pressure/volume elevated, mild RV  Dilation, global right ventricular function moderately reduced. Troponin initial 57-->peaked at 3548--> down trending  - Stop monitoring vitals      Pulmonary  # Massive saddle PE s/p mechanical thrombectomy  # Probable aspiration pneumonia  # Acute hypoxemic respiratory failure requiring intubation, resolved  Pre thrombectomy PA pressures  50/26 (35) and post thrombectomy 35/15 (26). Extubated 1/17/22. Now on RA. Heparin discontinue 1/20  - Stop monitoring     Infectious Disease  # Aspiration Pneumonia  # Leukocytosis in setting of arrest/PE  - No longer monitoring     Renal, Electrolytes  # Hypoalbuminemia   Lactic acidosis resolved, now 0.6. Creatinine 0.58. Making urine  - No longer monitoring        Gastrointestinal, Nutrition  # Shock liver 2/2 cardiac arrest  # Underweight  # Gallbladder edema   - No longer monitoring    Hematology  # Thrombocytopenia  # Saddle PE  # Anemia, acute blood loss and critical illness  - No longer monitoring     Endocrinology  # Hyperglycemia in the setting critical illness  No known medical history. Hgb a1c 5.4%     Integumentary:  No skin issues    Pertinent Lines  N/A    ICU Cares:  N/A    Family Update: , update by me    Interval History:  Comfort care afternoon 1/20. Transferred to floor. Intermittently with some seizure activity. None seen today by me. Appears comfortable.  remains at bedside, coping appropriately.     Objective Findings:      No other vitals as patient is on comfort cares.         Medications:    acetaminophen  650 mg Oral or Feeding Tube Q4H     bromocriptine  5 mg Oral BID     levETIRAcetam  2,000 mg Intravenous Q12H     valproate (DEPACON) in NS intermittent infusion  750 mg Intravenous Q8H       midazolam 2 mg/hr (01/25/22 0937)       Labs:   No labs as patient  is on comfort cares.

## 2022-01-25 NOTE — PROGRESS NOTES
[id number]  Comfort cares, palliative following.     VS: Tachypnea noted at beginning of shift, waning as shift progressed. Pulse ranging in 80's-100's.     Labs:   Pain/Nausea/PRN: Pt appearing at ease, minimal tremors noted to extremities.   LDA: PIV x2, Right PIV SL, Left TKO with versed 2ml/hr  : minimal urine output this shift.   Skin: Extremities warm, fingertips cooling. Skin mottle and pale, cheeks beginning to sink. Periorbital blueness beginning.    Plan:  at bedside, tearful talking to family via phone and ipad, support provided and breaks encouraged.

## 2022-01-25 NOTE — PLAN OF CARE
Pt on comfort cares.  at bedside throughout the night. Dilaudid 0.5mg given q1h to relieve air hunger. 250 ml output in goncalves. No BM. Will continue to monitor and notify regarding changes.

## 2022-01-25 NOTE — PROGRESS NOTES
"SPIRITUAL HEALTH SERVICES  SPIRITUAL ASSESSMENT Progress Note (Palliative Focus)  [facility] ([place] 7A    REFERRAL SOURCE: Palliative care follow up.    Co-visit with florentino Reyes with patient Kimmy Gerardo's  in the antechamber to her room.    Tao continues to keep chavez at bedside. He said, \"It's hard for everyone,\" while noting that he wants to be present with Kimmy. Her parents are grieving and returned to [place] today.      Tao finds some meaning in Kimmy's continuing to live; he believes there must a reason she is still alive or something she is waiting for, or some way she still wants to help someone.    Hoahaoism marlen remains a source of comfort and meaning, and chaplain Reyes offered prayer at bedside following our visit. I also provided eucalyptus massage oil and aromatherapy at Tao's request.    Plan: I will follow for spiritual support while Palliative Care is consulted.    Triny Suarez  Palliative   Pager [phone]  [facility] Inpatient Team Consult pager [phone] (M-F 8-4:30)  After-hours Answering Service [phone]    "

## 2022-01-26 NOTE — PROGRESS NOTES
MD DEATH PRONOUNCEMENT     Physical Exam: Unresponsive to noxious stimuli, no spontaneous respirations. Breath and heart sounds absent, no pupillary light reflex or corneal reflex. No pulses present.    Patient was pronounced dead at: 2022  Time of death: 04:15am    Active Problems:   No past medical history on file.    Please consider an autopsy if any of the following exist:   NO  Unexpected or unexplained death during or following any dental, medical, or surgical diagnostic treatment procedures.    NO  Death of mother at or up to seven days after delivery.    NO  All  and pediatric deaths.    NO  Death where the cause is sufficiently obscure to delay completion of the death certificate.    NO  Deaths in which autopsy would confirm a suspected illness/condition that would affect surviving family members or recipients of transplanted organs.      The following deaths must be reported to the 's Office:   NO  A death that may be due entirely or in part to any factors other than natural disease (recent surgery, recent trauma, suspected abuse/neglect).    NO  A death that may be an accident, suicide, or homicide.    NO  Any sudden, unexpected death in which there is no prior history of significant heart disease or any other condition associated with sudden death.    NO  Any death which is apparently due to natural causes but in which the  does not have a personal physician familiar with the patient s medical history, social, or environmental situation or the circumstances of the terminal event.    NO  Any death apparently due to Sudden Infant Death Syndrome.    NO  Deaths that occur during, in association with, or as consequences of a diagnostic, therapeutic, or anesthetic procedure.    NO  Any death in which a fracture of a major bone has occurred within the past (6) six months.    NO  Any death in which the  was an inmate of a public institution or was in the custody of Law  Enforcement personnel.      Disposition: Autopsy was discussed with family member/POA, and the family elected no autopsy.    Fauzia Fernández MD  Internal Medicine Resident, PGY-2  Pager #[phone]

## 2022-01-26 NOTE — DISCHARGE SUMMARY
I have seen and examined the patient with the CSI team. I agree with the assessment and plan of the discharge summary note above.I have reviewed pertinent labs. More than 30 minutes were spent organizing the patient's discharge.    Albino Kurtz MD  Interventional Cardiology  Pager: [phone]        [address] [address]  p: [phone]    Discharge Summary: Cardiology Service    Kimmy Gerardo MRN# [id number]   YOB: 1980 Age: 41 year old       Admission Date: 1/15/2022  Discharge Date: 01/26/22      Discharge Diagnoses:  # Anoxic brain injury   # Seizures  # OOHCA with ROSC 2/2 PE  # Moderate RV Dysfunction secondary to PE  # Massive saddle PE s/p mechanical thrombectomy  # Acute hypoxemic respiratory failure requiring intubation, resolved  # Leukocytosis   # Shock liver 2/2 cardiac arrest    Brief HPI:  Kimmy Gerardo is a 41 year old female with PMHx of ventral hernia s/p repair who presents after out of hospital cardiac arrest with ROSC 2/2 PE.       Per report, patient underwent recent ventral hernia 1/13/22 repair at [facility].  1/15 am,  noted patient to be pale and clammy after using bathroom and then became unresponsive. He started CPR and EMS was called, arriving shortly thereafter. Initial rhythm not shockable. She did achieve ROSC but did arrest two additional times en route to hospital and had ongoing CPR on arrival to [facility] ED. She was intubated and taken to CT scanner which revealed large saddle PE with concern for right heart strain  She was transferred to [facility] where she was taken urgently to IR suite for pulmonary angiogram with thrombectomy. Pre thrombectomy PA pressures  50/26 (35) and post thrombectomy 35/15 (26). She was resumed on IV Heparin and transferred to Cardiology ICU for continued cares.  Transitioned to comfort care after poor neuro prognosis and anoxic brain injury. Passed  away 1/26 with  at bedside.     Hospital Course by Diagnosis:  Neurology   # Anoxic brain injury   # Delirium   S/p therapeutic cooling. CTH revealed no acute intracranial pathology. vEEG without seizures, although irritable.  Started on valproic acid 1/18 per Neurocrit. QMRI with extensive anoxic injury 1/19. Neuro injury and prognosis discussed with family and patient transitioned to comfort care.     Cardiovascular  # OOHCA with ROSC 2/2 PE  # Moderate RV Dysfunction secondary to PE  # Tachycardia, Sinus     Pulmonary  # Massive saddle PE s/p mechanical thrombectomy  # Probable aspiration pneumonia  # Acute hypoxemic respiratory failure requiring intubation, resolved  Pre thrombectomy PA pressures  50/26 (35) and post thrombectomy 35/15 (26).      Infectious Disease  # Aspiration Pneumonia  # Leukocytosis in setting of arrest/PE     Renal, Electrolytes  # Hypoalbuminemia         Gastrointestinal, Nutrition  # Shock liver 2/2 cardiac arrest  # Underweight  # Gallbladder edema      Hematology  # Thrombocytopenia  # Saddle PE  # Anemia, acute blood loss and critical illness     Endocrinology  # Hyperglycemia in the setting critical illness     Integumentary:  No skin issues    Condition on discharge   See death summary    Code status:  DNR  ANNE MARIE Spann, CNP  [facility]  Interventional Cardiology-CSI Service  Pager [phone]       Patient Care Team:  No Ref-Primary, Physician as PCP - General

## 2022-01-26 NOTE — PLAN OF CARE
Pt passed away d/t cardiac arrest, anoxic brain injury at 0415. Notified MD and called donor referral line.     Donor coordinator will call back with more information in relation to donation.     Support given to pt spouse. All required documentation completed.

## 2022-01-31 LAB
7AMINOCLONAZEPAM SERPL-MCNC: NEGATIVE NG/ML
ALPRAZ SERPL-MCNC: NEGATIVE NG/ML
AMPHET BLD CFM-MCNC: NEGATIVE NG/ML
APAP BLD-MCNC: NEGATIVE UG/ML
BARBITURATES SPEC-MCNC: NEGATIVE UG/ML
BENZODIAZ SPEC QL: POSITIVE
BENZODIAZ SPEC-MCNC: ABNORMAL NG/ML
BUPRENORPHINE SERPL-MCNC: NEGATIVE NG/ML
BZE BLD CFM-MCNC: NEGATIVE NG/ML
CARBOXYTHC BLD-MCNC: NEGATIVE NG/ML
CARISOPRODOL IA: NEGATIVE UG/ML
CHLORDIAZEP SERPL-MCNC: NEGATIVE NG/ML
CLONAZEPAM SERPL-MCNC: NEGATIVE NG/ML
DECLARED MEDICATIONS: ABNORMAL
DESALKYLFLURAZ SERPL CFM-MCNC: NEGATIVE NG/ML
DIAZEPAM SERPL-MCNC: NEGATIVE NG/ML
DRUGS FLD: POSITIVE
ETHANOL BLD-MCNC: NEGATIVE GM/DL
FENTANYL BLD CFM-MCNC: 1.3 NG/ML
FENTANYL IA: ABNORMAL NG/ML
FENTANYL SPEC QL: POSITIVE
FLURAZEPAM SPEC-MCNC: NEGATIVE NG/ML
GABAPENTIN IA: NEGATIVE UG/ML
LORAZEPAM SERPL-MCNC: NEGATIVE NG/ML
MEPERIDINE SERPLBLD-MCNC: NEGATIVE NG/ML
METHADONE SAL CFM-MCNC: NEGATIVE NG/ML
MIDAZOLAM SERPL-MCNC: 204 NG/ML
NORCHLORDIAZEP SERPL-MCNC: NEGATIVE NG/ML
NORDIAZEPAM SPEC-MCNC: NEGATIVE NG/ML
NORFENTANYL BLD CFM-MCNC: NEGATIVE NG/ML
OPIATES SPEC-MCNC: NEGATIVE NG/ML
OXAZEPAM SERPL CFM-MCNC: NEGATIVE NG/ML
OXYCODONE SERPLBLD SCN-MCNC: NEGATIVE NG/ML
PCP SPEC-MCNC: NEGATIVE NG/ML
PROPOXYPH SPEC-MCNC: NEGATIVE NG/ML
TEMAZEPAM SERPL-MCNC: NEGATIVE NG/ML
TRAMADOL BLD-MCNC: NEGATIVE NG/ML
TRIAZOLAM SPEC-MCNC: NEGATIVE NG/ML

## (undated) RX ORDER — HEPARIN SODIUM 200 [USP'U]/100ML
INJECTION, SOLUTION INTRAVENOUS
Status: DISPENSED
Start: 2022-01-01

## (undated) RX ORDER — HEPARIN SODIUM 1000 [USP'U]/ML
INJECTION, SOLUTION INTRAVENOUS; SUBCUTANEOUS
Status: DISPENSED
Start: 2022-01-01